# Patient Record
Sex: FEMALE | Race: WHITE | NOT HISPANIC OR LATINO | URBAN - METROPOLITAN AREA
[De-identification: names, ages, dates, MRNs, and addresses within clinical notes are randomized per-mention and may not be internally consistent; named-entity substitution may affect disease eponyms.]

---

## 2018-01-23 ENCOUNTER — INPATIENT (INPATIENT)
Facility: HOSPITAL | Age: 83
LOS: 5 days | Discharge: HOME | End: 2018-01-29
Attending: INTERNAL MEDICINE

## 2018-02-02 DIAGNOSIS — I10 ESSENTIAL (PRIMARY) HYPERTENSION: ICD-10-CM

## 2018-02-02 DIAGNOSIS — N39.0 URINARY TRACT INFECTION, SITE NOT SPECIFIED: ICD-10-CM

## 2018-02-02 DIAGNOSIS — Z98.890 OTHER SPECIFIED POSTPROCEDURAL STATES: ICD-10-CM

## 2018-02-02 DIAGNOSIS — J18.9 PNEUMONIA, UNSPECIFIED ORGANISM: ICD-10-CM

## 2018-02-02 DIAGNOSIS — R47.9 UNSPECIFIED SPEECH DISTURBANCES: ICD-10-CM

## 2018-02-02 DIAGNOSIS — E78.5 HYPERLIPIDEMIA, UNSPECIFIED: ICD-10-CM

## 2018-02-02 DIAGNOSIS — J15.6 PNEUMONIA DUE TO OTHER GRAM-NEGATIVE BACTERIA: ICD-10-CM

## 2018-02-02 DIAGNOSIS — J12.3 HUMAN METAPNEUMOVIRUS PNEUMONIA: ICD-10-CM

## 2018-02-02 DIAGNOSIS — M19.90 UNSPECIFIED OSTEOARTHRITIS, UNSPECIFIED SITE: ICD-10-CM

## 2018-02-02 DIAGNOSIS — Z74.01 BED CONFINEMENT STATUS: ICD-10-CM

## 2018-02-02 DIAGNOSIS — B96.20 UNSPECIFIED ESCHERICHIA COLI [E. COLI] AS THE CAUSE OF DISEASES CLASSIFIED ELSEWHERE: ICD-10-CM

## 2018-02-02 DIAGNOSIS — Z88.0 ALLERGY STATUS TO PENICILLIN: ICD-10-CM

## 2018-02-02 DIAGNOSIS — F03.90 UNSPECIFIED DEMENTIA WITHOUT BEHAVIORAL DISTURBANCE: ICD-10-CM

## 2018-02-02 DIAGNOSIS — A41.9 SEPSIS, UNSPECIFIED ORGANISM: ICD-10-CM

## 2018-02-04 DIAGNOSIS — J18.9 PNEUMONIA, UNSPECIFIED ORGANISM: ICD-10-CM

## 2018-02-05 DIAGNOSIS — F02.80 DEMENTIA IN OTHER DISEASES CLASSIFIED ELSEWHERE, UNSPECIFIED SEVERITY, WITHOUT BEHAVIORAL DISTURBANCE, PSYCHOTIC DISTURBANCE, MOOD DISTURBANCE, AND ANXIETY: ICD-10-CM

## 2018-02-05 DIAGNOSIS — A41.89 OTHER SPECIFIED SEPSIS: ICD-10-CM

## 2018-02-05 DIAGNOSIS — L89.892 PRESSURE ULCER OF OTHER SITE, STAGE 2: ICD-10-CM

## 2018-02-05 DIAGNOSIS — E87.6 HYPOKALEMIA: ICD-10-CM

## 2018-02-05 DIAGNOSIS — G30.9 ALZHEIMER'S DISEASE, UNSPECIFIED: ICD-10-CM

## 2018-12-21 ENCOUNTER — INPATIENT (INPATIENT)
Facility: HOSPITAL | Age: 83
LOS: 3 days | Discharge: HOME HEALTH | End: 2018-12-25
Attending: HOSPITALIST | Admitting: HOSPITALIST

## 2018-12-21 VITALS
RESPIRATION RATE: 18 BRPM | HEART RATE: 75 BPM | OXYGEN SATURATION: 99 % | SYSTOLIC BLOOD PRESSURE: 153 MMHG | DIASTOLIC BLOOD PRESSURE: 82 MMHG | TEMPERATURE: 97 F

## 2018-12-21 LAB
ALBUMIN SERPL ELPH-MCNC: 3.5 G/DL — SIGNIFICANT CHANGE UP (ref 3.5–5.2)
ALP SERPL-CCNC: 83 U/L — SIGNIFICANT CHANGE UP (ref 30–115)
ALT FLD-CCNC: 27 U/L — SIGNIFICANT CHANGE UP (ref 0–41)
ANION GAP SERPL CALC-SCNC: 15 MMOL/L — HIGH (ref 7–14)
APPEARANCE UR: CLEAR — SIGNIFICANT CHANGE UP
AST SERPL-CCNC: 36 U/L — SIGNIFICANT CHANGE UP (ref 0–41)
BASOPHILS # BLD AUTO: 0.03 K/UL — SIGNIFICANT CHANGE UP (ref 0–0.2)
BASOPHILS NFR BLD AUTO: 0.3 % — SIGNIFICANT CHANGE UP (ref 0–1)
BILIRUB SERPL-MCNC: 0.3 MG/DL — SIGNIFICANT CHANGE UP (ref 0.2–1.2)
BILIRUB UR-MCNC: NEGATIVE — SIGNIFICANT CHANGE UP
BUN SERPL-MCNC: 18 MG/DL — SIGNIFICANT CHANGE UP (ref 10–20)
CALCIUM SERPL-MCNC: 8.9 MG/DL — SIGNIFICANT CHANGE UP (ref 8.5–10.1)
CHLORIDE SERPL-SCNC: 106 MMOL/L — SIGNIFICANT CHANGE UP (ref 98–110)
CO2 SERPL-SCNC: 27 MMOL/L — SIGNIFICANT CHANGE UP (ref 17–32)
COLOR SPEC: YELLOW — SIGNIFICANT CHANGE UP
CREAT SERPL-MCNC: 0.7 MG/DL — SIGNIFICANT CHANGE UP (ref 0.7–1.5)
DIFF PNL FLD: NEGATIVE — SIGNIFICANT CHANGE UP
EOSINOPHIL # BLD AUTO: 0.34 K/UL — SIGNIFICANT CHANGE UP (ref 0–0.7)
EOSINOPHIL NFR BLD AUTO: 3.3 % — SIGNIFICANT CHANGE UP (ref 0–8)
EPI CELLS # UR: ABNORMAL /HPF
GLUCOSE SERPL-MCNC: 150 MG/DL — HIGH (ref 70–99)
GLUCOSE UR QL: 250 MG/DL
HCT VFR BLD CALC: 40.3 % — SIGNIFICANT CHANGE UP (ref 37–47)
HGB BLD-MCNC: 12.5 G/DL — SIGNIFICANT CHANGE UP (ref 12–16)
IMM GRANULOCYTES NFR BLD AUTO: 0.7 % — HIGH (ref 0.1–0.3)
KETONES UR-MCNC: NEGATIVE — SIGNIFICANT CHANGE UP
LEUKOCYTE ESTERASE UR-ACNC: NEGATIVE — SIGNIFICANT CHANGE UP
LYMPHOCYTES # BLD AUTO: 2.29 K/UL — SIGNIFICANT CHANGE UP (ref 1.2–3.4)
LYMPHOCYTES # BLD AUTO: 22.4 % — SIGNIFICANT CHANGE UP (ref 20.5–51.1)
MCHC RBC-ENTMCNC: 25.1 PG — LOW (ref 27–31)
MCHC RBC-ENTMCNC: 31 G/DL — LOW (ref 32–37)
MCV RBC AUTO: 80.8 FL — LOW (ref 81–99)
MONOCYTES # BLD AUTO: 0.55 K/UL — SIGNIFICANT CHANGE UP (ref 0.1–0.6)
MONOCYTES NFR BLD AUTO: 5.4 % — SIGNIFICANT CHANGE UP (ref 1.7–9.3)
NEUTROPHILS # BLD AUTO: 6.93 K/UL — HIGH (ref 1.4–6.5)
NEUTROPHILS NFR BLD AUTO: 67.9 % — SIGNIFICANT CHANGE UP (ref 42.2–75.2)
NITRITE UR-MCNC: NEGATIVE — SIGNIFICANT CHANGE UP
NRBC # BLD: 0 /100 WBCS — SIGNIFICANT CHANGE UP (ref 0–0)
PH UR: 6 — SIGNIFICANT CHANGE UP (ref 5–8)
PLATELET # BLD AUTO: 241 K/UL — SIGNIFICANT CHANGE UP (ref 130–400)
POTASSIUM SERPL-MCNC: 4.6 MMOL/L — SIGNIFICANT CHANGE UP (ref 3.5–5)
POTASSIUM SERPL-SCNC: 4.6 MMOL/L — SIGNIFICANT CHANGE UP (ref 3.5–5)
PROT SERPL-MCNC: 7.5 G/DL — SIGNIFICANT CHANGE UP (ref 6–8)
PROT UR-MCNC: 30 MG/DL
RBC # BLD: 4.99 M/UL — SIGNIFICANT CHANGE UP (ref 4.2–5.4)
RBC # FLD: 16.1 % — HIGH (ref 11.5–14.5)
SODIUM SERPL-SCNC: 148 MMOL/L — HIGH (ref 135–146)
SP GR SPEC: 1.02 — SIGNIFICANT CHANGE UP (ref 1.01–1.03)
UROBILINOGEN FLD QL: 1 MG/DL (ref 0.2–0.2)
WBC # BLD: 10.21 K/UL — SIGNIFICANT CHANGE UP (ref 4.8–10.8)
WBC # FLD AUTO: 10.21 K/UL — SIGNIFICANT CHANGE UP (ref 4.8–10.8)
WBC UR QL: SIGNIFICANT CHANGE UP /HPF

## 2018-12-21 RX ORDER — SODIUM CHLORIDE 9 MG/ML
1000 INJECTION INTRAMUSCULAR; INTRAVENOUS; SUBCUTANEOUS ONCE
Qty: 0 | Refills: 0 | Status: COMPLETED | OUTPATIENT
Start: 2018-12-21 | End: 2018-12-21

## 2018-12-21 RX ADMIN — SODIUM CHLORIDE 2000 MILLILITER(S): 9 INJECTION INTRAMUSCULAR; INTRAVENOUS; SUBCUTANEOUS at 22:03

## 2018-12-21 NOTE — ED ADULT TRIAGE NOTE - CHIEF COMPLAINT QUOTE
pt brought in by daughter decreased PO intake x 3 days, generalized weakness pt has a history of advanced alzheimer's and has become non verbal and less responsive symptoms started 3 days ago

## 2018-12-22 DIAGNOSIS — Z90.49 ACQUIRED ABSENCE OF OTHER SPECIFIED PARTS OF DIGESTIVE TRACT: Chronic | ICD-10-CM

## 2018-12-22 LAB
ANION GAP SERPL CALC-SCNC: 13 MMOL/L — SIGNIFICANT CHANGE UP (ref 7–14)
ANION GAP SERPL CALC-SCNC: 16 MMOL/L — HIGH (ref 7–14)
BUN SERPL-MCNC: 10 MG/DL — SIGNIFICANT CHANGE UP (ref 10–20)
BUN SERPL-MCNC: 9 MG/DL — LOW (ref 10–20)
CALCIUM SERPL-MCNC: 7.8 MG/DL — LOW (ref 8.5–10.1)
CALCIUM SERPL-MCNC: 8 MG/DL — LOW (ref 8.5–10.1)
CHLORIDE SERPL-SCNC: 105 MMOL/L — SIGNIFICANT CHANGE UP (ref 98–110)
CHLORIDE SERPL-SCNC: 107 MMOL/L — SIGNIFICANT CHANGE UP (ref 98–110)
CO2 SERPL-SCNC: 23 MMOL/L — SIGNIFICANT CHANGE UP (ref 17–32)
CO2 SERPL-SCNC: 23 MMOL/L — SIGNIFICANT CHANGE UP (ref 17–32)
CREAT SERPL-MCNC: 0.5 MG/DL — LOW (ref 0.7–1.5)
CREAT SERPL-MCNC: 0.5 MG/DL — LOW (ref 0.7–1.5)
ESTIMATED AVERAGE GLUCOSE: 143 MG/DL — HIGH (ref 68–114)
GLUCOSE SERPL-MCNC: 114 MG/DL — HIGH (ref 70–99)
GLUCOSE SERPL-MCNC: 224 MG/DL — HIGH (ref 70–99)
HBA1C BLD-MCNC: 6.6 % — HIGH (ref 4–5.6)
HCT VFR BLD CALC: 35.2 % — LOW (ref 37–47)
HGB BLD-MCNC: 11.1 G/DL — LOW (ref 12–16)
MAGNESIUM SERPL-MCNC: 1.9 MG/DL — SIGNIFICANT CHANGE UP (ref 1.8–2.4)
MCHC RBC-ENTMCNC: 25.3 PG — LOW (ref 27–31)
MCHC RBC-ENTMCNC: 31.5 G/DL — LOW (ref 32–37)
MCV RBC AUTO: 80.2 FL — LOW (ref 81–99)
NRBC # BLD: 0 /100 WBCS — SIGNIFICANT CHANGE UP (ref 0–0)
PLATELET # BLD AUTO: 106 K/UL — LOW (ref 130–400)
POTASSIUM SERPL-MCNC: 3.5 MMOL/L — SIGNIFICANT CHANGE UP (ref 3.5–5)
POTASSIUM SERPL-MCNC: 3.7 MMOL/L — SIGNIFICANT CHANGE UP (ref 3.5–5)
POTASSIUM SERPL-SCNC: 3.5 MMOL/L — SIGNIFICANT CHANGE UP (ref 3.5–5)
POTASSIUM SERPL-SCNC: 3.7 MMOL/L — SIGNIFICANT CHANGE UP (ref 3.5–5)
RBC # BLD: 4.39 M/UL — SIGNIFICANT CHANGE UP (ref 4.2–5.4)
RBC # FLD: 15.8 % — HIGH (ref 11.5–14.5)
SODIUM SERPL-SCNC: 143 MMOL/L — SIGNIFICANT CHANGE UP (ref 135–146)
SODIUM SERPL-SCNC: 144 MMOL/L — SIGNIFICANT CHANGE UP (ref 135–146)
TROPONIN T SERPL-MCNC: <0.01 NG/ML — SIGNIFICANT CHANGE UP
WBC # BLD: 7.76 K/UL — SIGNIFICANT CHANGE UP (ref 4.8–10.8)
WBC # FLD AUTO: 7.76 K/UL — SIGNIFICANT CHANGE UP (ref 4.8–10.8)

## 2018-12-22 RX ORDER — DOCUSATE SODIUM 100 MG
1 CAPSULE ORAL
Qty: 0 | Refills: 0 | COMMUNITY

## 2018-12-22 RX ORDER — COLLAGENASE CLOSTRIDIUM HIST. 250 UNIT/G
1 OINTMENT (GRAM) TOPICAL
Qty: 0 | Refills: 0 | Status: DISCONTINUED | OUTPATIENT
Start: 2018-12-22 | End: 2018-12-25

## 2018-12-22 RX ORDER — SODIUM CHLORIDE 9 MG/ML
1000 INJECTION INTRAMUSCULAR; INTRAVENOUS; SUBCUTANEOUS
Qty: 0 | Refills: 0 | Status: DISCONTINUED | OUTPATIENT
Start: 2018-12-22 | End: 2018-12-22

## 2018-12-22 RX ORDER — ENOXAPARIN SODIUM 100 MG/ML
40 INJECTION SUBCUTANEOUS EVERY 24 HOURS
Qty: 0 | Refills: 0 | Status: DISCONTINUED | OUTPATIENT
Start: 2018-12-22 | End: 2018-12-25

## 2018-12-22 RX ORDER — ASPIRIN/CALCIUM CARB/MAGNESIUM 324 MG
81 TABLET ORAL DAILY
Qty: 0 | Refills: 0 | Status: DISCONTINUED | OUTPATIENT
Start: 2018-12-22 | End: 2018-12-25

## 2018-12-22 RX ORDER — ATORVASTATIN CALCIUM 80 MG/1
80 TABLET, FILM COATED ORAL AT BEDTIME
Qty: 0 | Refills: 0 | Status: DISCONTINUED | OUTPATIENT
Start: 2018-12-22 | End: 2018-12-25

## 2018-12-22 RX ORDER — SODIUM CHLORIDE 9 MG/ML
1000 INJECTION INTRAMUSCULAR; INTRAVENOUS; SUBCUTANEOUS ONCE
Qty: 0 | Refills: 0 | Status: COMPLETED | OUTPATIENT
Start: 2018-12-22 | End: 2018-12-22

## 2018-12-22 RX ORDER — AMLODIPINE BESYLATE 2.5 MG/1
10 TABLET ORAL DAILY
Qty: 0 | Refills: 0 | Status: DISCONTINUED | OUTPATIENT
Start: 2018-12-22 | End: 2018-12-25

## 2018-12-22 RX ORDER — MEMANTINE HYDROCHLORIDE 10 MG/1
10 TABLET ORAL AT BEDTIME
Qty: 0 | Refills: 0 | Status: DISCONTINUED | OUTPATIENT
Start: 2018-12-22 | End: 2018-12-25

## 2018-12-22 RX ORDER — SODIUM CHLORIDE 9 MG/ML
1000 INJECTION INTRAMUSCULAR; INTRAVENOUS; SUBCUTANEOUS
Qty: 0 | Refills: 0 | Status: DISCONTINUED | OUTPATIENT
Start: 2018-12-22 | End: 2018-12-23

## 2018-12-22 RX ADMIN — ATORVASTATIN CALCIUM 80 MILLIGRAM(S): 80 TABLET, FILM COATED ORAL at 21:38

## 2018-12-22 RX ADMIN — AMLODIPINE BESYLATE 10 MILLIGRAM(S): 2.5 TABLET ORAL at 06:44

## 2018-12-22 RX ADMIN — MEMANTINE HYDROCHLORIDE 10 MILLIGRAM(S): 10 TABLET ORAL at 21:38

## 2018-12-22 RX ADMIN — SODIUM CHLORIDE 60 MILLILITER(S): 9 INJECTION INTRAMUSCULAR; INTRAVENOUS; SUBCUTANEOUS at 21:38

## 2018-12-22 RX ADMIN — SODIUM CHLORIDE 60 MILLILITER(S): 9 INJECTION INTRAMUSCULAR; INTRAVENOUS; SUBCUTANEOUS at 18:41

## 2018-12-22 RX ADMIN — ENOXAPARIN SODIUM 40 MILLIGRAM(S): 100 INJECTION SUBCUTANEOUS at 06:44

## 2018-12-22 RX ADMIN — SODIUM CHLORIDE 75 MILLILITER(S): 9 INJECTION INTRAMUSCULAR; INTRAVENOUS; SUBCUTANEOUS at 06:18

## 2018-12-22 RX ADMIN — SODIUM CHLORIDE 1000 MILLILITER(S): 9 INJECTION INTRAMUSCULAR; INTRAVENOUS; SUBCUTANEOUS at 02:23

## 2018-12-22 NOTE — ED PROVIDER NOTE - PHYSICAL EXAMINATION
VITAL SIGNS: I have reviewed the initial vital signs.   CONSTITUTIONAL: Awake, alert. Well-developed; well-nourished; in no distress. Non-toxic appearing.   SKIN: No rash, vesicles/lesion, abrasions or lacerations. No ecchymosis or signs of trauma. Cap refill < 2 secs.   HEAD: Normocephalic; atraumatic. Elderly. Contracted positioning.   EYES: Symmetrical, no discharge or signs of trauma. Conjunctiva and sclera clear.   CARD: No chest wall deformity or tenderness. S1, S2 normal; no murmurs, gallops, or rubs. Regular rate and rhythm.  RESP: Good air movement. Lungs CTAB. No crackles, wheezes, rales or rhonchi.  ABD: Soft; non-distended; non-tender. No rebound/guarding/rigidity.   EXT: No bony deformity or tenderness. Normal ROM x 4 extremities.   NEURO: Non-verbal. n/v intact UE/LE b/l, pulses symmetrical.
negative...

## 2018-12-22 NOTE — H&P ADULT - NSHPPHYSICALEXAM_GEN_ALL_CORE
T(C): 36.4 (22 Dec 2018 01:23), Max: 36.4 (22 Dec 2018 01:23)  T(F): 97.5 (22 Dec 2018 01:23), Max: 97.5 (22 Dec 2018 01:23)  HR: 84 (22 Dec 2018 01:23) (75 - 84)  BP: 137/63 (22 Dec 2018 01:23) (137/63 - 153/82)  RR: 18 (22 Dec 2018 01:23) (18 - 18)  SpO2: 98% (22 Dec 2018 01:23) (98% - 99%)    GENERAL: NAD, speaks in full sentences, no signs of respiratory distress  CHEST/LUNG: Clear to auscultation bilaterally; No wheeze; No crackles; No accessory muscles used  HEART: Regular rate and rhythm; No murmurs;   ABDOMEN: Soft, Nontender, Nondistended; Bowel sounds present; No guarding  EXTREMITIES:  2+ Peripheral Pulses, No cyanosis or edema  PSYCH: AAOx3  NEUROLOGY: non-focal  SKIN: No rashes or lesions T(C): 36.4 (22 Dec 2018 01:23), Max: 36.4 (22 Dec 2018 01:23)  T(F): 97.5 (22 Dec 2018 01:23), Max: 97.5 (22 Dec 2018 01:23)  HR: 84 (22 Dec 2018 01:23) (75 - 84)  BP: 137/63 (22 Dec 2018 01:23) (137/63 - 153/82)  RR: 18 (22 Dec 2018 01:23) (18 - 18)  SpO2: 98% (22 Dec 2018 01:23) (98% - 99%)    GENERAL: NAD, speaks in full sentences, no signs of respiratory distress  CHEST/LUNG: Clear to auscultation bilaterally; No wheeze;   HEART: Regular rate and rhythm;   ABDOMEN: Soft, Nontender, Nondistended;   EXTREMITIES:   No cyanosis or edema  PSYCH: AAOx0  SKIN: pressure ulcers on buttocks

## 2018-12-22 NOTE — ED PROVIDER NOTE - OBJECTIVE STATEMENT
Pt is an 82 y/o Female, PMHX of Arthritis, Alzheimer's dementia, htn, hyperlipidemia, presents to ED accompanied by daughter and son-in-law for failure to thrive. As per family, they state she has been having decreased PO intake, tired and weak since yesterday. They report she is typically non-mobile and non-verbal at baseline, but they feel she is more weak than usual. Report she is unable to indicate whether she is in pain, but family noticed she has not been coughing, spiking fevers or vomiting.

## 2018-12-22 NOTE — H&P ADULT - NSHPLABSRESULTS_GEN_ALL_CORE
12.5   10 )-----------( 241      ( 21 Dec 2018 22:15 )             40.3     148<H>  |  106  |  18  ----------------------------<  150<H>  4.6   |  27  |  0.7    Ca    8.9      21 Dec 2018 22:15    TPro  7.5  /  Alb  3.5  /  TBili  0.3  /  DBili  x   /  AST  36  /  ALT  27  /  AlkPhos  83  12-    Urinalysis Basic - ( 21 Dec 2018 22:15 )    Color: Yellow / Appearance: Clear / S.025 / pH: x  Gluc: x / Ketone: Negative  / Bili: Negative / Urobili: 1.0 mg/dL   Blood: x / Protein: 30 mg/dL / Nitrite: Negative   Leuk Esterase: Negative / RBC: x / WBC 1-2 /HPF   Sq Epi: x / Non Sq Epi: Few /HPF / Bacteria: x    CARDIAC MARKERS ( 21 Dec 2018 23:30 )  x     / <0.01 ng/mL / x     / x     / x

## 2018-12-22 NOTE — H&P ADULT - PMH
Arthritis    Dementia    High cholesterol    HTN (hypertension) Arthritis    Bedbound    Bedsore    Dementia    High cholesterol    HTN (hypertension)    Nonverbal    Pressure ulcer

## 2018-12-22 NOTE — ED ADULT NURSE NOTE - NSIMPLEMENTINTERV_GEN_ALL_ED
Implemented All Fall Risk Interventions:  [place] to call system. Call bell, personal items and telephone within reach. Instruct patient to call for assistance. Room bathroom lighting operational. Non-slip footwear when patient is off stretcher. Physically safe environment: no spills, clutter or unnecessary equipment. Stretcher in lowest position, wheels locked, appropriate side rails in place. Provide visual cue, wrist band, yellow gown, etc. Monitor gait and stability. Monitor for mental status changes and reorient to person, place, and time. Review medications for side effects contributing to fall risk. Reinforce activity limits and safety measures with patient and family.

## 2018-12-22 NOTE — ED ADULT NURSE NOTE - OBJECTIVE STATEMENT
Family states pt has become increasingly sleepy over the past day. Pt responding to tactile and verbal stimulation.

## 2018-12-22 NOTE — ED PROVIDER NOTE - ATTENDING CONTRIBUTION TO CARE
84 yo f with pmh of dementia 82 yo f with pmh of dementia, htn, hld, presents to ED with c/o weakness and increased sleepiness.  as per family, noted since yesterday. dec appetite, only small sips of water x 2 days.  no fever ,no chills, no cp, no sob, no abd pain, no urinary sx.  pt however does not complain.  no falls.  pt is taken care of only by daughter and son in law.  exam: nad, ncat, perrl, eomi, mmm, rrr, ctab, abd soft, nt,nd, arousable imp: pt with weakness, sleepiness and failure to thrive, labs, ekg, cxr, ua, likely admission

## 2018-12-22 NOTE — PATIENT PROFILE ADULT - LANGUAGE ASSISTANCE NEEDED
No-Patient/Caregiver offered and refused free interpretation services./pt dx with dementia, pt unable to communicate via  phone. daughter in law at bedside

## 2018-12-22 NOTE — ED PROVIDER NOTE - MEDICAL DECISION MAKING DETAILS
pt with increased weakness for 2 days, mildly hypernatremic, likely dehydration, ivf and rehab eval pt with increased weakness for 2 days, mildly hypernatremic, likely dehydration, ivf and sw eval

## 2018-12-22 NOTE — H&P ADULT - ASSESSMENT
# hypernatremia likely 2/2 dehydration  # Failure to thrive  # Decrease PO intake  # Inability to ambulate    # Dementia    # HTN    # Dvt ppx  - sc lovenox    # Dispo  - needs placement  - pt/rehab cs # hypernatremia likely 2/2 dehydration  # Failure to thrive  # Decrease PO intake  # Inability to ambulate  - pt/rehab cs  - IVF NS at 75cc for now  - multivitamin     # Dementia  - c/w home meds    # HTN/ DLD/ OA  - stable  - c/w home meds    # Dvt ppx  - sc lovenox    # Dispo  - needs placement  - pt/rehab cs 83 yof with pmh of severe dementia, bedbound, nonverbal, htn, dld, pressure ulcers, OA was brought in by family due to decrease po intake and weakness since 2 days ptp    # hypernatremia likely 2/2 dehydration  # Failure to thrive  # Decrease PO intake  # Weakness 2/2 infectious etiology vs worsening dementia  - IVF NS at 75cc for now  - multivitamin   - feeding assistance; ensure  - pan culture    # Pressure ulcers  - Wound care   - frequent turning    # Dementia  - c/w home meds    # HTN/ DLD/ OA  - stable  - c/w home meds    # Dvt ppx  - sc lovenox    # Dispo  - home with VNS  - please discuss advance directives with family; Informed daughter in law about DNR/DNI option;

## 2018-12-22 NOTE — ED PROVIDER NOTE - PROGRESS NOTE DETAILS
Will admit pt for failure to thrive to medicine under hospitalist. Spoke w/ MAR - aware of pt for admission.

## 2018-12-22 NOTE — H&P ADULT - HISTORY OF PRESENT ILLNESS
83 yof with pmh of severe dementia, bedbound, nonverbal, htn, dld, pressure ulcers, OA was brought in by family due to decrease po intake and weakness since 2 days ptp  - At baseline, pt is non verbal, bedbound and has pressure ulcers; she usually eats soft food with assistance and makes moaning sounds; However, since 2 days she has not been eating or drinking much and appears more weak  - pt from home; dependent on all ADLs since 4 years; lives with family at home;

## 2018-12-22 NOTE — H&P ADULT - ATTENDING COMMENTS
83 yof with pmh of severe dementia, bedbound, nonverbal, htn, dld, pressure ulcers, OA was brought in by family due to decrease po intake and generalized weakness since 2 days ptp    # hypernatremia likely 2/2 dehydration  # Failure to thrive  # Decrease PO intake since 2 days  # Generalized Weakness   # Metabolic Encephalopathy on top of her Baseline Dementia (2/2 hypernatremia)  -Sodium improved with fluids.   Eating better now.   May Decrease IVFs to NS at 60cc/hr now  - multivitamin   - feeding assistance; ensure  - pan culture    #Drop in platelets :   likely dilutional. will monitor.    # Pressure ulcers POA  - don't appear infected  - Wound care   - frequent turning    # Dementia  - c/w home meds    # HTN/ DLD/ OA  - stable  - c/w home meds    # Dvt ppx  - sc lovenox    # Dispo  - home with VNS  - will discuss advance directives with family; Informed daughter in law about DNR/DNI option;

## 2018-12-23 LAB
ALBUMIN SERPL ELPH-MCNC: 2.8 G/DL — LOW (ref 3.5–5.2)
ALP SERPL-CCNC: 67 U/L — SIGNIFICANT CHANGE UP (ref 30–115)
ALT FLD-CCNC: 23 U/L — SIGNIFICANT CHANGE UP (ref 0–41)
ANION GAP SERPL CALC-SCNC: 15 MMOL/L — HIGH (ref 7–14)
APTT BLD: 31.6 SEC — SIGNIFICANT CHANGE UP (ref 27–39.2)
AST SERPL-CCNC: 24 U/L — SIGNIFICANT CHANGE UP (ref 0–41)
BASOPHILS # BLD AUTO: 0.03 K/UL — SIGNIFICANT CHANGE UP (ref 0–0.2)
BASOPHILS NFR BLD AUTO: 0.4 % — SIGNIFICANT CHANGE UP (ref 0–1)
BILIRUB SERPL-MCNC: <0.2 MG/DL — SIGNIFICANT CHANGE UP (ref 0.2–1.2)
BLD GP AB SCN SERPL QL: SIGNIFICANT CHANGE UP
BUN SERPL-MCNC: 14 MG/DL — SIGNIFICANT CHANGE UP (ref 10–20)
CALCIUM SERPL-MCNC: 8 MG/DL — LOW (ref 8.5–10.1)
CHLORIDE SERPL-SCNC: 106 MMOL/L — SIGNIFICANT CHANGE UP (ref 98–110)
CO2 SERPL-SCNC: 21 MMOL/L — SIGNIFICANT CHANGE UP (ref 17–32)
CREAT SERPL-MCNC: 0.6 MG/DL — LOW (ref 0.7–1.5)
CULTURE RESULTS: NO GROWTH — SIGNIFICANT CHANGE UP
EOSINOPHIL # BLD AUTO: 0.4 K/UL — SIGNIFICANT CHANGE UP (ref 0–0.7)
EOSINOPHIL NFR BLD AUTO: 5.8 % — SIGNIFICANT CHANGE UP (ref 0–8)
GLUCOSE SERPL-MCNC: 197 MG/DL — HIGH (ref 70–99)
HCT VFR BLD CALC: 34.3 % — LOW (ref 37–47)
HGB BLD-MCNC: 10.6 G/DL — LOW (ref 12–16)
IMM GRANULOCYTES NFR BLD AUTO: 1.4 % — HIGH (ref 0.1–0.3)
INR BLD: 1.05 RATIO — SIGNIFICANT CHANGE UP (ref 0.65–1.3)
LYMPHOCYTES # BLD AUTO: 1.95 K/UL — SIGNIFICANT CHANGE UP (ref 1.2–3.4)
LYMPHOCYTES # BLD AUTO: 28.3 % — SIGNIFICANT CHANGE UP (ref 20.5–51.1)
MAGNESIUM SERPL-MCNC: 1.9 MG/DL — SIGNIFICANT CHANGE UP (ref 1.8–2.4)
MCHC RBC-ENTMCNC: 24.8 PG — LOW (ref 27–31)
MCHC RBC-ENTMCNC: 30.9 G/DL — LOW (ref 32–37)
MCV RBC AUTO: 80.3 FL — LOW (ref 81–99)
MONOCYTES # BLD AUTO: 0.42 K/UL — SIGNIFICANT CHANGE UP (ref 0.1–0.6)
MONOCYTES NFR BLD AUTO: 6.1 % — SIGNIFICANT CHANGE UP (ref 1.7–9.3)
NEUTROPHILS # BLD AUTO: 4 K/UL — SIGNIFICANT CHANGE UP (ref 1.4–6.5)
NEUTROPHILS NFR BLD AUTO: 58 % — SIGNIFICANT CHANGE UP (ref 42.2–75.2)
NRBC # BLD: 0 /100 WBCS — SIGNIFICANT CHANGE UP (ref 0–0)
PLATELET # BLD AUTO: 212 K/UL — SIGNIFICANT CHANGE UP (ref 130–400)
POTASSIUM SERPL-MCNC: 4 MMOL/L — SIGNIFICANT CHANGE UP (ref 3.5–5)
POTASSIUM SERPL-SCNC: 4 MMOL/L — SIGNIFICANT CHANGE UP (ref 3.5–5)
PROT SERPL-MCNC: 5.7 G/DL — LOW (ref 6–8)
PROTHROM AB SERPL-ACNC: 12.1 SEC — SIGNIFICANT CHANGE UP (ref 9.95–12.87)
RBC # BLD: 4.27 M/UL — SIGNIFICANT CHANGE UP (ref 4.2–5.4)
RBC # FLD: 15.6 % — HIGH (ref 11.5–14.5)
SODIUM SERPL-SCNC: 142 MMOL/L — SIGNIFICANT CHANGE UP (ref 135–146)
SPECIMEN SOURCE: SIGNIFICANT CHANGE UP
TYPE + AB SCN PNL BLD: SIGNIFICANT CHANGE UP
WBC # BLD: 6.9 K/UL — SIGNIFICANT CHANGE UP (ref 4.8–10.8)
WBC # FLD AUTO: 6.9 K/UL — SIGNIFICANT CHANGE UP (ref 4.8–10.8)

## 2018-12-23 RX ORDER — SODIUM CHLORIDE 9 MG/ML
1000 INJECTION INTRAMUSCULAR; INTRAVENOUS; SUBCUTANEOUS
Qty: 0 | Refills: 0 | Status: DISCONTINUED | OUTPATIENT
Start: 2018-12-23 | End: 2018-12-24

## 2018-12-23 RX ADMIN — ATORVASTATIN CALCIUM 80 MILLIGRAM(S): 80 TABLET, FILM COATED ORAL at 21:57

## 2018-12-23 RX ADMIN — SODIUM CHLORIDE 60 MILLILITER(S): 9 INJECTION INTRAMUSCULAR; INTRAVENOUS; SUBCUTANEOUS at 12:52

## 2018-12-23 RX ADMIN — ENOXAPARIN SODIUM 40 MILLIGRAM(S): 100 INJECTION SUBCUTANEOUS at 05:45

## 2018-12-23 RX ADMIN — SODIUM CHLORIDE 50 MILLILITER(S): 9 INJECTION INTRAMUSCULAR; INTRAVENOUS; SUBCUTANEOUS at 19:13

## 2018-12-23 RX ADMIN — Medication 1 TABLET(S): at 12:50

## 2018-12-23 RX ADMIN — Medication 81 MILLIGRAM(S): at 12:49

## 2018-12-23 RX ADMIN — Medication 1 APPLICATION(S): at 17:32

## 2018-12-23 RX ADMIN — MEMANTINE HYDROCHLORIDE 10 MILLIGRAM(S): 10 TABLET ORAL at 21:57

## 2018-12-23 RX ADMIN — AMLODIPINE BESYLATE 10 MILLIGRAM(S): 2.5 TABLET ORAL at 05:31

## 2018-12-23 RX ADMIN — Medication 1 APPLICATION(S): at 05:32

## 2018-12-23 NOTE — PROGRESS NOTE ADULT - ASSESSMENT
83 yof with pmh of severe dementia, bedbound, nonverbal, htn, dld, pressure ulcers, OA was brought in by family due to decrease po intake and generalized weakness since 2 days ptp    # hypernatremia likely 2/2 dehydration  # Failure to thrive  # Decrease PO intake since 2 days  # Generalized Weakness   # Metabolic Encephalopathy on top of her Baseline Dementia (2/2 hypernatremia)  -Sodium improved with fluids.   Eating better now.   May Decrease IVFs to NS at 50cc/hr now if eating alright  - multivitamin   - feeding assistance; ensure  - pan culture    #Drop in platelets : was dilutional. normal again now.   Although Hgb dropped a little. Could be dilutional. Monitor CBC    # Pressure ulcers POA  - don't appear infected  - Wound care   - frequent turning    # Dementia  - c/w home meds    # HTN/ DLD/ OA  - stable  - c/w home meds    # Dvt ppx  - sc lovenox    # Dispo  - home with VNS  - will discuss advance directives with family; Informed daughter in law about DNR/DNI option;

## 2018-12-23 NOTE — PROGRESS NOTE ADULT - SUBJECTIVE AND OBJECTIVE BOX
S : No new events/complaints  BReathing ok      All other pertinent ROS negative.      Vital Signs Last 24 Hrs  T(C): 36.9 (23 Dec 2018 13:34), Max: 37.3 (23 Dec 2018 05:20)  T(F): 98.5 (23 Dec 2018 13:34), Max: 99.1 (23 Dec 2018 05:20)  HR: 86 (23 Dec 2018 13:34) (86 - 95)  BP: 119/55 (23 Dec 2018 13:34) (104/52 - 119/55)  BP(mean): --  RR: 18 (23 Dec 2018 13:34) (18 - 18)  SpO2: 96% (22 Dec 2018 19:50) (96% - 96%)  PHYSICAL EXAM:    Constitutional: NAD,  HEENT: PERR, EOMI, Normal Hearing, MMM  Neck: Soft and supple, No LAD, No JVD  Respiratory: Breath sounds are clear bilaterally, No wheezing, rales or rhonchi  Cardiovascular: S1 and S2, regular rate and rhythm, no Murmurs, gallops or rubs  Gastrointestinal: Bowel Sounds present, soft, nontender, nondistended, no guarding, no rebound  Extremities: No peripheral edema      MEDICATIONS:  MEDICATIONS  (STANDING):  amLODIPine   Tablet 10 milliGRAM(s) Oral daily  aspirin enteric coated 81 milliGRAM(s) Oral daily  atorvastatin 80 milliGRAM(s) Oral at bedtime  collagenase Ointment 1 Application(s) Topical two times a day  enoxaparin Injectable 40 milliGRAM(s) SubCutaneous every 24 hours  memantine 10 milliGRAM(s) Oral at bedtime  multivitamin 1 Tablet(s) Oral daily  sodium chloride 0.9%. 1000 milliLiter(s) (60 mL/Hr) IV Continuous <Continuous>      LABS: All Labs Reviewed:                        10.6   6.90  )-----------( 212      ( 23 Dec 2018 10:23 )             34.3     12-23    142  |  106  |  14  ----------------------------<  197<H>  4.0   |  21  |  0.6<L>    Ca    8.0<L>      23 Dec 2018 10:23  Mg     1.9     12-23    TPro  5.7<L>  /  Alb  2.8<L>  /  TBili  <0.2  /  DBili  x   /  AST  24  /  ALT  23  /  AlkPhos  67  12-23    PT/INR - ( 23 Dec 2018 10:23 )   PT: 12.10 sec;   INR: 1.05 ratio         PTT - ( 23 Dec 2018 10:23 )  PTT:31.6 sec  CARDIAC MARKERS ( 21 Dec 2018 23:30 )  x     / <0.01 ng/mL / x     / x     / x          Blood Culture:     Radiology: reviewed

## 2018-12-24 LAB
ALBUMIN SERPL ELPH-MCNC: 3.2 G/DL — LOW (ref 3.5–5.2)
ALP SERPL-CCNC: 74 U/L — SIGNIFICANT CHANGE UP (ref 30–115)
ALT FLD-CCNC: 30 U/L — SIGNIFICANT CHANGE UP (ref 0–41)
ANION GAP SERPL CALC-SCNC: 16 MMOL/L — HIGH (ref 7–14)
AST SERPL-CCNC: 33 U/L — SIGNIFICANT CHANGE UP (ref 0–41)
BASOPHILS # BLD AUTO: 0.03 K/UL — SIGNIFICANT CHANGE UP (ref 0–0.2)
BASOPHILS NFR BLD AUTO: 0.4 % — SIGNIFICANT CHANGE UP (ref 0–1)
BILIRUB SERPL-MCNC: <0.2 MG/DL — SIGNIFICANT CHANGE UP (ref 0.2–1.2)
BUN SERPL-MCNC: 11 MG/DL — SIGNIFICANT CHANGE UP (ref 10–20)
CALCIUM SERPL-MCNC: 8.5 MG/DL — SIGNIFICANT CHANGE UP (ref 8.5–10.1)
CHLORIDE SERPL-SCNC: 98 MMOL/L — SIGNIFICANT CHANGE UP (ref 98–110)
CO2 SERPL-SCNC: 22 MMOL/L — SIGNIFICANT CHANGE UP (ref 17–32)
CREAT SERPL-MCNC: 0.6 MG/DL — LOW (ref 0.7–1.5)
EOSINOPHIL # BLD AUTO: 0.41 K/UL — SIGNIFICANT CHANGE UP (ref 0–0.7)
EOSINOPHIL NFR BLD AUTO: 4.8 % — SIGNIFICANT CHANGE UP (ref 0–8)
GLUCOSE SERPL-MCNC: 241 MG/DL — HIGH (ref 70–99)
HCT VFR BLD CALC: 35.3 % — LOW (ref 37–47)
HGB BLD-MCNC: 11.3 G/DL — LOW (ref 12–16)
IMM GRANULOCYTES NFR BLD AUTO: 1.5 % — HIGH (ref 0.1–0.3)
LYMPHOCYTES # BLD AUTO: 1.87 K/UL — SIGNIFICANT CHANGE UP (ref 1.2–3.4)
LYMPHOCYTES # BLD AUTO: 21.8 % — SIGNIFICANT CHANGE UP (ref 20.5–51.1)
MAGNESIUM SERPL-MCNC: 1.9 MG/DL — SIGNIFICANT CHANGE UP (ref 1.8–2.4)
MCHC RBC-ENTMCNC: 25.4 PG — LOW (ref 27–31)
MCHC RBC-ENTMCNC: 32 G/DL — SIGNIFICANT CHANGE UP (ref 32–37)
MCV RBC AUTO: 79.3 FL — LOW (ref 81–99)
MONOCYTES # BLD AUTO: 0.52 K/UL — SIGNIFICANT CHANGE UP (ref 0.1–0.6)
MONOCYTES NFR BLD AUTO: 6.1 % — SIGNIFICANT CHANGE UP (ref 1.7–9.3)
NEUTROPHILS # BLD AUTO: 5.61 K/UL — SIGNIFICANT CHANGE UP (ref 1.4–6.5)
NEUTROPHILS NFR BLD AUTO: 65.4 % — SIGNIFICANT CHANGE UP (ref 42.2–75.2)
NRBC # BLD: 0 /100 WBCS — SIGNIFICANT CHANGE UP (ref 0–0)
PLATELET # BLD AUTO: 196 K/UL — SIGNIFICANT CHANGE UP (ref 130–400)
POTASSIUM SERPL-MCNC: 3.9 MMOL/L — SIGNIFICANT CHANGE UP (ref 3.5–5)
POTASSIUM SERPL-SCNC: 3.9 MMOL/L — SIGNIFICANT CHANGE UP (ref 3.5–5)
PROT SERPL-MCNC: 6.3 G/DL — SIGNIFICANT CHANGE UP (ref 6–8)
RBC # BLD: 4.45 M/UL — SIGNIFICANT CHANGE UP (ref 4.2–5.4)
RBC # FLD: 15.6 % — HIGH (ref 11.5–14.5)
SODIUM SERPL-SCNC: 136 MMOL/L — SIGNIFICANT CHANGE UP (ref 135–146)
WBC # BLD: 8.57 K/UL — SIGNIFICANT CHANGE UP (ref 4.8–10.8)
WBC # FLD AUTO: 8.57 K/UL — SIGNIFICANT CHANGE UP (ref 4.8–10.8)

## 2018-12-24 RX ORDER — CHLORHEXIDINE GLUCONATE 213 G/1000ML
1 SOLUTION TOPICAL
Qty: 0 | Refills: 0 | Status: DISCONTINUED | OUTPATIENT
Start: 2018-12-24 | End: 2018-12-25

## 2018-12-24 RX ORDER — COLLAGENASE CLOSTRIDIUM HIST. 250 UNIT/G
1 OINTMENT (GRAM) TOPICAL
Qty: 1 | Refills: 0 | OUTPATIENT
Start: 2018-12-24 | End: 2019-01-22

## 2018-12-24 RX ORDER — CHLORHEXIDINE GLUCONATE 213 G/1000ML
1 SOLUTION TOPICAL
Qty: 0 | Refills: 0 | Status: DISCONTINUED | OUTPATIENT
Start: 2018-12-24 | End: 2018-12-24

## 2018-12-24 RX ORDER — AMLODIPINE BESYLATE 2.5 MG/1
1 TABLET ORAL
Qty: 0 | Refills: 0 | COMMUNITY
Start: 2018-12-24

## 2018-12-24 RX ORDER — AMLODIPINE BESYLATE 2.5 MG/1
1 TABLET ORAL
Qty: 0 | Refills: 0 | COMMUNITY

## 2018-12-24 RX ADMIN — Medication 1 APPLICATION(S): at 17:32

## 2018-12-24 RX ADMIN — SODIUM CHLORIDE 50 MILLILITER(S): 9 INJECTION INTRAMUSCULAR; INTRAVENOUS; SUBCUTANEOUS at 11:38

## 2018-12-24 RX ADMIN — ENOXAPARIN SODIUM 40 MILLIGRAM(S): 100 INJECTION SUBCUTANEOUS at 05:44

## 2018-12-24 RX ADMIN — CHLORHEXIDINE GLUCONATE 1 APPLICATION(S): 213 SOLUTION TOPICAL at 07:01

## 2018-12-24 RX ADMIN — Medication 1 APPLICATION(S): at 05:35

## 2018-12-24 RX ADMIN — Medication 81 MILLIGRAM(S): at 11:39

## 2018-12-24 RX ADMIN — AMLODIPINE BESYLATE 10 MILLIGRAM(S): 2.5 TABLET ORAL at 05:34

## 2018-12-24 RX ADMIN — Medication 1 TABLET(S): at 11:39

## 2018-12-24 RX ADMIN — ATORVASTATIN CALCIUM 80 MILLIGRAM(S): 80 TABLET, FILM COATED ORAL at 21:23

## 2018-12-24 RX ADMIN — MEMANTINE HYDROCHLORIDE 10 MILLIGRAM(S): 10 TABLET ORAL at 21:23

## 2018-12-24 NOTE — PROGRESS NOTE ADULT - SUBJECTIVE AND OBJECTIVE BOX
S : Sleeps most of the day.   Wakes up on being woken up for meals. Eats meals. Then again sleeps.   Bed Bound.   This is apparently her baseline.       All other pertinent ROS negative.      Vital Signs Last 24 Hrs  T(C): 37.8 (24 Dec 2018 05:28), Max: 37.8 (24 Dec 2018 05:28)  T(F): 100 (24 Dec 2018 05:28), Max: 100 (24 Dec 2018 05:28)  HR: 87 (24 Dec 2018 05:28) (86 - 91)  BP: 125/56 (24 Dec 2018 05:28) (117/63 - 125/56)  BP(mean): 83 (23 Dec 2018 21:00) (83 - 83)  RR: 18 (24 Dec 2018 05:28) (18 - 18)  SpO2: 95% (24 Dec 2018 08:00) (95% - 96%)  PHYSICAL EXAM:    Constitutional: sleepy but arousable after much sternal rub stimulation  HEENT: PERR, EOMI, Normal Hearing, MMM  Neck: Soft and supple, No LAD, No JVD  Respiratory: Breath sounds are clear bilaterally, No wheezing, rales or rhonchi  Cardiovascular: S1 and S2, regular rate and rhythm, no Murmurs, gallops or rubs  Gastrointestinal: Bowel Sounds present, soft, nontender, nondistended, no guarding, no rebound  Extremities: No peripheral edema      MEDICATIONS:  MEDICATIONS  (STANDING):  amLODIPine   Tablet 10 milliGRAM(s) Oral daily  aspirin enteric coated 81 milliGRAM(s) Oral daily  atorvastatin 80 milliGRAM(s) Oral at bedtime  chlorhexidine 4% Liquid 1 Application(s) Topical <User Schedule>  collagenase Ointment 1 Application(s) Topical two times a day  enoxaparin Injectable 40 milliGRAM(s) SubCutaneous every 24 hours  memantine 10 milliGRAM(s) Oral at bedtime  multivitamin 1 Tablet(s) Oral daily  sodium chloride 0.9%. 1000 milliLiter(s) (50 mL/Hr) IV Continuous <Continuous>      LABS: All Labs Reviewed:                        11.3   8.57  )-----------( 196      ( 24 Dec 2018 09:32 )             35.3     12-24    136  |  98  |  11  ----------------------------<  241<H>  3.9   |  22  |  0.6<L>    Ca    8.5      24 Dec 2018 09:32  Mg     1.9     12-24    TPro  6.3  /  Alb  3.2<L>  /  TBili  <0.2  /  DBili  x   /  AST  33  /  ALT  30  /  AlkPhos  74  12-24    PT/INR - ( 23 Dec 2018 10:23 )   PT: 12.10 sec;   INR: 1.05 ratio         PTT - ( 23 Dec 2018 10:23 )  PTT:31.6 sec      Blood Culture: 12-22 @ 17:37  Organism --  Gram Stain Blood -- Gram Stain --  Specimen Source .Blood None  Culture-Blood --    12-21 @ 22:15  Organism --  Gram Stain Blood -- Gram Stain --  Specimen Source .Urine Catheterized  Culture-Blood --        Radiology: reviewed

## 2018-12-24 NOTE — PROGRESS NOTE ADULT - ASSESSMENT
83 yof with pmh of severe dementia, bedbound, nonverbal, htn, dld, pressure ulcers, OA was brought in by family due to decrease po intake and generalized weakness since 2 days ptp    # hypernatremia likely 2/2 dehydration  # Failure to thrive  # Decrease PO intake since 2 days  # Generalized Weakness   # Metabolic Encephalopathy on top of her Baseline Dementia (2/2 hypernatremia)  -Sodium improved with fluids.   Eating better now. May D/c IVFs  - multivitamin   - feeding assistance; ensure    We did not find any possible e/o infection as the cause of her problems.     #Drop in platelets : was dilutional. normal again now.   Now avoid daily labs     # Pressure ulcers POA  - don't appear infected  - Wound care   - frequent turning  -Burn eval to just make sure nothing more needs to be done.     # Dementia  - c/w home meds    # HTN/ DLD/ OA  - stable  - c/w home meds    # Dvt ppx  - sc lovenox    # Dispo  Nothing more we are doing for her in the hospital   When daughter in law comes to hospital again tell her she is ready for D/c (tried reaching - didn't answer the phone)  -  home with VNS  - will discuss advance directives with family; Informed daughter in law about DNR/DNI option; 83 yof with pmh of severe dementia, bedbound, nonverbal, htn, dld, pressure ulcers, OA was brought in by family due to decrease po intake and generalized weakness since 2 days ptp    # hypernatremia likely 2/2 dehydration  # Failure to thrive  # Decrease PO intake since 2 days  # Generalized Weakness   # Metabolic Encephalopathy on top of her Baseline Dementia (2/2 hypernatremia)  -Sodium improved with fluids.   Eating better now. May D/c IVFs  - multivitamin   - feeding assistance; ensure    We did not find any possible e/o infection as the cause of her problems.     #Drop in platelets : was dilutional. normal again now.   Now avoid daily labs     # Pressure ulcers POA  - don't appear infected  - Wound care   - frequent turning  -Burn eval to just make sure nothing more needs to be done.     # Dementia  - c/w home meds    # HTN/ DLD/ OA  - stable  - c/w home meds    # Dvt ppx  - sc lovenox    # Dispo  Nothing more we are doing for her in the hospital   Discussed with daughter in law at bedside. D/c home w/ home VN tomorrow.   - explained to her to discuss GOC with patient's  (who is the HCP). They can then ask questions of their PMD and decide GOC.

## 2018-12-24 NOTE — DISCHARGE NOTE ADULT - MEDICATION SUMMARY - MEDICATIONS TO TAKE
I will START or STAY ON the medications listed below when I get home from the hospital:    Aspirin Enteric Coated 81 mg oral delayed release tablet  -- 1 tab(s) by mouth once a day  -- Indication: For coronary artery disease    rosuvastatin 20 mg oral tablet  -- 1 tab(s) by mouth once a day (at bedtime)  -- Indication: For Hyperlipidemia    amLODIPine 10 mg oral tablet  -- 1 tab(s) by mouth once a day  -- Indication: For Hypertension    Exelon 4.6 mg/24 hr transdermal film, extended release  -- 1 patch by transdermal patch once a day  -- Indication: For Dementia    collagenase 250 units/g topical ointment  -- 1 application on skin 2 times a day  -- Indication: For Sacral ulcer    docusate sodium 100 mg oral tablet  -- 1 tab(s) by mouth 2 times a day  -- Indication: For constipation    memantine 10 mg oral tablet  -- 1 tab(s) by mouth once a day  -- Indication: For Dementia    Multiple Vitamins oral tablet  -- 1 tab(s) by mouth once a day  -- Indication: For vitamin

## 2018-12-24 NOTE — DISCHARGE NOTE ADULT - CARE PLAN
Principal Discharge DX:	Failure to thrive in adult  Goal:	Treat and prevent complications of failure to thrive  Assessment and plan of treatment:	Patient had high sodium which resolved with hydration. She is currently eating better now.   Please use santyl for pressure ulcers and turn pt frequently to prevent more ulcers.  Follow up with PMD Dr. Dey in 1 week.  Return to ED if worsening symptoms including altered mental status, not eating, fever/chills. Principal Discharge DX:	Failure to thrive in adult  Goal:	Treat and prevent complications of failure to thrive  Assessment and plan of treatment:	Patient had high sodium which resolved with hydration. She is currently eating better now.   Please use santyl for pressure ulcers and turn pt frequently to prevent more ulcers.  Follow up with PMD Dr. Dey in 1 week.  Return to ED if worsening symptoms including altered mental status, not eating, fever/chills.  Secondary Diagnosis:	Pressure injury of left hip, unstageable  Goal:	Wound care  Assessment and plan of treatment:	The left hip pressure ulcer was unstageable. It was debrided on 12/25. Now it is apparently a stage 3. Apply Santyl then Moist gauze then Allevyn daily.   For the less severe ulcers on Sacrum and Right hip - may just do allevyn.  f/u PMD. Daughter in law will be taught today. Home VN will reinforce. Principal Discharge DX:	Failure to thrive in adult  Goal:	Treat and prevent complications of failure to thrive  Assessment and plan of treatment:	Patient had high sodium which resolved with hydration. She is currently eating better now.   Please use santyl for pressure ulcers and turn pt frequently to prevent more ulcers.  Follow up with PMD Dr. Dey in 1 week.  Return to ED if worsening symptoms including altered mental status, not eating, fever/chills.  Give ensure supplements with every meal.  Secondary Diagnosis:	Pressure injury of left hip, unstageable  Goal:	Wound care  Assessment and plan of treatment:	The left hip pressure ulcer was unstageable. It was debrided on 12/25. Now it is apparently a stage 3. Apply Santyl then Moist gauze then Allevyn daily.   For the less severe ulcers on Sacrum and Right hip - may just do allevyn.  f/u PMD. Daughter in law will be taught today. Home VN will reinforce.

## 2018-12-24 NOTE — DISCHARGE NOTE ADULT - PATIENT PORTAL LINK FT
You can access the [email or web address] Patient Portal, offered by [facility], by registering with the following website: [email or web address]

## 2018-12-24 NOTE — DISCHARGE NOTE ADULT - HOSPITAL COURSE
83 yof with pmh of severe dementia, bedbound, nonverbal, htn, dld, pressure ulcers, OA was brought in by family due to decrease po intake and weakness since 2 days ptp  - At baseline, pt is non verbal, bedbound and has pressure ulcers; she usually eats soft food with assistance and makes moaning sounds; However, since 2 days she has not been eating or drinking much and appears more weak  - pt from home; dependent on all ADLs since 4 years; lives with family at home;     # hypernatremia likely 2/2 dehydration  # Failure to thrive  # Decrease PO intake since 2 days  # Generalized Weakness   # Metabolic Encephalopathy on top of her Baseline Dementia (2/2 hypernatremia)  -Sodium improved with fluids.   Eating better now. May D/c IVFs  - multivitamin   - feeding assistance; ensure    We did not find any possible e/o infection as the cause of her problems.     #Drop in platelets : was dilutional. normal again now.   Now avoid daily labs     # Pressure ulcers POA  - don't appear infected  - Wound care   - frequent turning  -Burn eval to just make sure nothing more needs to be done.     # Dementia  - c/w home meds    # HTN/ DLD/ OA  - stable  - c/w home meds    # Dvt ppx  - sc lovenox    # Dispo- Home VN 83 yof with pmh of severe dementia, bedbound, nonverbal, htn, dld, pressure ulcers, OA was brought in by family due to decrease po intake and weakness since 2 days ptp  - At baseline, pt is non verbal, bedbound and has pressure ulcers; she usually eats soft food with assistance and makes moaning sounds; However, since 2 days she has not been eating or drinking much and appears more weak  - pt from home; dependent on all ADLs since 4 years; lives with family at home;     # hypernatremia likely 2/2 dehydration  # Failure to thrive  # Decrease PO intake since 2 days  # Generalized Weakness   # Metabolic Encephalopathy on top of her Baseline Dementia (2/2 hypernatremia)  -Sodium improved with fluids.   Eating better now. May D/c IVFs  - multivitamin   - feeding assistance; ensure    We did not find any possible e/o infection as the cause of her problems.     #Drop in platelets : was dilutional. normal again now.   Now avoid daily labs     # Pressure ulcers POA  - don't appear infected  -left hip ulcer debrided.   - Wound care as instructed.   - frequent turning      # Dementia  - c/w home meds    # HTN/ DLD/ OA  - stable  - c/w home meds    # Dvt ppx  - sc lovenox    # Dispo- Home VN    Attending NOte (12/25):  d/c plan reviewed and agreed with  Today exam - not different from yesterday.    D/c to home.

## 2018-12-24 NOTE — DIETITIAN INITIAL EVALUATION ADULT. - OTHER INFO
Pt was brought in by family due to decrease po intake and generalized weakness x 2 days pta. Hypernatremia likely 2/2 dehydration + Metabolic Encephalopathy on top of Baseline Dementia (2/2 hypernatremia), eating better now (IV fluids decreased and changed to NS). Pt w/pressure ulcers (not infected per MD). Reason for assessment: pressure ulcer stage II or greater.

## 2018-12-24 NOTE — DIETITIAN INITIAL EVALUATION ADULT. - ENERGY NEEDS
Estimated energy needs: 1435-1675kcal/d (30-35kcal/kg act wt)   Estimated protein needs: 57-72gm/d (1.2-1.5 gm/kg act wt)  Estimated fluid needs: 1ml/1kcal or as per LIP

## 2018-12-24 NOTE — DIETITIAN INITIAL EVALUATION ADULT. - DIET TYPE
supplement (specify)/on soft diet + ENsure Enlive q 8hrs - POintake is not doceumneted however as per flow progress notes PO intake has improved since admission; AM RN and CA does not know how pt is eating/soft

## 2018-12-24 NOTE — DISCHARGE NOTE ADULT - PLAN OF CARE
Treat and prevent complications of failure to thrive Patient had high sodium which resolved with hydration. She is currently eating better now.   Please use santyl for pressure ulcers and turn pt frequently to prevent more ulcers.  Follow up with PMD Dr. Dey in 1 week.  Return to ED if worsening symptoms including altered mental status, not eating, fever/chills. Wound care The left hip pressure ulcer was unstageable. It was debrided on 12/25. Now it is apparently a stage 3. Apply Santyl then Moist gauze then Allevyn daily.   For the less severe ulcers on Sacrum and Right hip - may just do allevyn.  f/u PMD. Daughter in law will be taught today. Home VN will reinforce. Patient had high sodium which resolved with hydration. She is currently eating better now.   Please use santyl for pressure ulcers and turn pt frequently to prevent more ulcers.  Follow up with PMD Dr. Dey in 1 week.  Return to ED if worsening symptoms including altered mental status, not eating, fever/chills.  Give ensure supplements with every meal.

## 2018-12-24 NOTE — DIETITIAN INITIAL EVALUATION ADULT. - MD RECOMMEND
please add CHO consistent modifier to current diet order, change Ensure ENlive to GLucerna shakes q 8hrs (660kcal, 30 gm protein), add Jubem q 12 hrs (160kcal, 5gm protein). Change MVI tyo MVI w/minerals, add Vitamin C 500mg q 12 hrs and Zinc sulfate 220mg q 24hrs (x 14days). Consider insulin regimen vs. oral meds?/other

## 2018-12-25 VITALS
SYSTOLIC BLOOD PRESSURE: 114 MMHG | DIASTOLIC BLOOD PRESSURE: 81 MMHG | RESPIRATION RATE: 18 BRPM | HEART RATE: 87 BPM | TEMPERATURE: 96 F

## 2018-12-25 LAB
ALBUMIN SERPL ELPH-MCNC: 3.1 G/DL — LOW (ref 3.5–5.2)
ALP SERPL-CCNC: 81 U/L — SIGNIFICANT CHANGE UP (ref 30–115)
ALT FLD-CCNC: 40 U/L — SIGNIFICANT CHANGE UP (ref 0–41)
ANION GAP SERPL CALC-SCNC: 14 MMOL/L — SIGNIFICANT CHANGE UP (ref 7–14)
AST SERPL-CCNC: 41 U/L — SIGNIFICANT CHANGE UP (ref 0–41)
BASOPHILS # BLD AUTO: 0.07 K/UL — SIGNIFICANT CHANGE UP (ref 0–0.2)
BASOPHILS NFR BLD AUTO: 0.7 % — SIGNIFICANT CHANGE UP (ref 0–1)
BILIRUB SERPL-MCNC: 0.2 MG/DL — SIGNIFICANT CHANGE UP (ref 0.2–1.2)
BLD GP AB SCN SERPL QL: SIGNIFICANT CHANGE UP
BUN SERPL-MCNC: 10 MG/DL — SIGNIFICANT CHANGE UP (ref 10–20)
CALCIUM SERPL-MCNC: 8.4 MG/DL — LOW (ref 8.5–10.1)
CHLORIDE SERPL-SCNC: 103 MMOL/L — SIGNIFICANT CHANGE UP (ref 98–110)
CO2 SERPL-SCNC: 24 MMOL/L — SIGNIFICANT CHANGE UP (ref 17–32)
CREAT SERPL-MCNC: 0.5 MG/DL — LOW (ref 0.7–1.5)
EOSINOPHIL # BLD AUTO: 0.43 K/UL — SIGNIFICANT CHANGE UP (ref 0–0.7)
EOSINOPHIL NFR BLD AUTO: 4.6 % — SIGNIFICANT CHANGE UP (ref 0–8)
GLUCOSE SERPL-MCNC: 165 MG/DL — HIGH (ref 70–99)
HCT VFR BLD CALC: 37 % — SIGNIFICANT CHANGE UP (ref 37–47)
HGB BLD-MCNC: 11.8 G/DL — LOW (ref 12–16)
IMM GRANULOCYTES NFR BLD AUTO: 2.6 % — HIGH (ref 0.1–0.3)
LYMPHOCYTES # BLD AUTO: 2.14 K/UL — SIGNIFICANT CHANGE UP (ref 1.2–3.4)
LYMPHOCYTES # BLD AUTO: 22.9 % — SIGNIFICANT CHANGE UP (ref 20.5–51.1)
MAGNESIUM SERPL-MCNC: 2.1 MG/DL — SIGNIFICANT CHANGE UP (ref 1.8–2.4)
MCHC RBC-ENTMCNC: 24.9 PG — LOW (ref 27–31)
MCHC RBC-ENTMCNC: 31.9 G/DL — LOW (ref 32–37)
MCV RBC AUTO: 78.1 FL — LOW (ref 81–99)
MONOCYTES # BLD AUTO: 0.56 K/UL — SIGNIFICANT CHANGE UP (ref 0.1–0.6)
MONOCYTES NFR BLD AUTO: 6 % — SIGNIFICANT CHANGE UP (ref 1.7–9.3)
NEUTROPHILS # BLD AUTO: 5.9 K/UL — SIGNIFICANT CHANGE UP (ref 1.4–6.5)
NEUTROPHILS NFR BLD AUTO: 63.2 % — SIGNIFICANT CHANGE UP (ref 42.2–75.2)
NRBC # BLD: 0 /100 WBCS — SIGNIFICANT CHANGE UP (ref 0–0)
PLATELET # BLD AUTO: 216 K/UL — SIGNIFICANT CHANGE UP (ref 130–400)
POTASSIUM SERPL-MCNC: 4 MMOL/L — SIGNIFICANT CHANGE UP (ref 3.5–5)
POTASSIUM SERPL-SCNC: 4 MMOL/L — SIGNIFICANT CHANGE UP (ref 3.5–5)
PROT SERPL-MCNC: 6.5 G/DL — SIGNIFICANT CHANGE UP (ref 6–8)
RBC # BLD: 4.74 M/UL — SIGNIFICANT CHANGE UP (ref 4.2–5.4)
RBC # FLD: 15.7 % — HIGH (ref 11.5–14.5)
SODIUM SERPL-SCNC: 141 MMOL/L — SIGNIFICANT CHANGE UP (ref 135–146)
TYPE + AB SCN PNL BLD: SIGNIFICANT CHANGE UP
WBC # BLD: 9.34 K/UL — SIGNIFICANT CHANGE UP (ref 4.8–10.8)
WBC # FLD AUTO: 9.34 K/UL — SIGNIFICANT CHANGE UP (ref 4.8–10.8)

## 2018-12-25 RX ORDER — COLLAGENASE CLOSTRIDIUM HIST. 250 UNIT/G
1 OINTMENT (GRAM) TOPICAL
Qty: 30 | Refills: 0 | OUTPATIENT
Start: 2018-12-25 | End: 2019-01-23

## 2018-12-25 RX ADMIN — Medication 1 APPLICATION(S): at 05:41

## 2018-12-25 RX ADMIN — Medication 1 TABLET(S): at 11:38

## 2018-12-25 RX ADMIN — ENOXAPARIN SODIUM 40 MILLIGRAM(S): 100 INJECTION SUBCUTANEOUS at 05:42

## 2018-12-25 RX ADMIN — Medication 81 MILLIGRAM(S): at 11:38

## 2018-12-25 RX ADMIN — CHLORHEXIDINE GLUCONATE 1 APPLICATION(S): 213 SOLUTION TOPICAL at 05:40

## 2018-12-25 RX ADMIN — AMLODIPINE BESYLATE 10 MILLIGRAM(S): 2.5 TABLET ORAL at 05:41

## 2018-12-25 NOTE — CONSULT NOTE ADULT - ATTENDING COMMENTS
pt examined as above   Debridement discussed with son and daughter in law and consent obtained for bedside debridement - se procedure note  Pt preeti well   Wound care as rec above

## 2018-12-25 NOTE — CONSULT NOTE ADULT - SUBJECTIVE AND OBJECTIVE BOX
83y  Female  HPI:  83 yof with pmh of severe dementia, bedbound, nonverbal, htn, dld, pressure ulcers, OA was brought in by family due to decrease po intake and weakness since 2 days. At baseline, pt is non verbal, bedbound and has pressure ulcers; she usually eats soft food with assistance and makes moaning sounds; However, since 2 days she has not been eating or drinking much and appears more weak.   pt from home; dependent on all ADLs since 4 years; lives with family at home; (22 Dec 2018 02:58). BURN consult requested for sacral and bilateral hip pressure ulcers.     Allergies    penicillin (Unknown)    PAST MEDICAL & SURGICAL HISTORY:  Bedsore  Pressure ulcer  Bedbound  Nonverbal  Arthritis  High cholesterol  Dementia  HTN (hypertension)  S/P appendectomy    ASSESSMENT: Pt is 83 y/ols Female with PMHx of severe dementia, bedbound, nonverbal, htn, dld, pressure ulcers, OA, was brought in by family due to decrease po intake and weakness since 2 days. Consult requested for sacral and bilateral hip pressure ulcers.     On physical exam:  Right hip and sacrum c/w small stage 2 pressure ulcers, no necrotic tissue, no discharge.  Left hip c/s full thickness pressure wound 3cm x 4cm, base with yellowish necrotic tissue, with serosanguinous discharge, no pus. Wound was debrided on bedside.     RECOMMENDATION:  Wound care:  Sacrum and Right hip: wash wounds with soap and water, apply Allevyn dressing daily.  Left hip: s/p bedside debridement, continue wound care: wash wound with soap and water, apply santyl ointment, pack with moist with normal saline gauze, cover with Allevyn dressing daily.   Offloading/ positional changes  Will follow.   If pt discharge home, follow up in [facility] with Dr Gimenez/Dr Whitt in two weeks. 83y  Female  HPI:  83 yof with pmh of severe dementia, bedbound, nonverbal, htn, dld, pressure ulcers, OA was brought in by family due to decrease po intake and weakness since 2 days. At baseline, pt is non verbal, bedbound and has pressure ulcers; she usually eats soft food with assistance and makes moaning sounds; However, since 2 days she has not been eating or drinking much and appears more weak.   pt from home; dependent on all ADLs since 4 years; lives with family at home; (22 Dec 2018 02:58). BURN consult requested for sacral and bilateral hip pressure ulcers.     Allergies    penicillin (Unknown)    PAST MEDICAL & SURGICAL HISTORY:  Bedsore  Pressure ulcer  Bedbound  Nonverbal  Arthritis  High cholesterol  Dementia  HTN (hypertension)  S/P appendectomy    ASSESSMENT: Pt is 83 y/ols Female with PMHx of severe dementia, bedbound, nonverbal, htn, dld, pressure ulcers, OA, was brought in by family due to decrease po intake and weakness since 2 days. Consult requested for sacral and bilateral hip pressure ulcers.     On physical exam:  Right hip and sacrum healing small stage 2 pressure ulcers, no necrotic tissue, no discharge.  Left hip c/s full thickness pressure wound 3cm x 4cm, base with yellow and grey black necrotic tissue, with serosanguinous discharge, no pus.       RECOMMENDATION:  Wound care:  Sacrum and Right hip: wash wounds with soap and water, apply Allevyn dressing daily.  Left hip: s/p bedside debridement, continue wound care: wash wound with soap and water, apply santyl ointment, pack with moist with normal saline gauze, cover with Allevyn dressing daily.   Offloading/ positional changes  Will follow.   If pt discharge home, follow up in BURN Clinic with Dr Gimenez/Dr Whitt in 2 weeks /prn

## 2018-12-25 NOTE — PROCEDURE NOTE - PROCEDURE
<<-----Click on this checkbox to enter Procedure Debridement and application of dressing  12/25/2018  Sharp excisional debridement of left hip pressure ulcer 4 x 3 x 1 cm  Active  [id number]

## 2018-12-25 NOTE — PROCEDURE NOTE - NSINFORMCONSENT_GEN_A_CORE
Benefits, risks, and possible complications of procedure explained to patient/caregiver who verbalized understanding and gave verbal consent./telephone

## 2018-12-28 DIAGNOSIS — R53.1 WEAKNESS: ICD-10-CM

## 2018-12-28 DIAGNOSIS — Z88.0 ALLERGY STATUS TO PENICILLIN: ICD-10-CM

## 2018-12-28 DIAGNOSIS — I10 ESSENTIAL (PRIMARY) HYPERTENSION: ICD-10-CM

## 2018-12-28 DIAGNOSIS — M19.90 UNSPECIFIED OSTEOARTHRITIS, UNSPECIFIED SITE: ICD-10-CM

## 2018-12-28 DIAGNOSIS — L89.323 PRESSURE ULCER OF LEFT BUTTOCK, STAGE 3: ICD-10-CM

## 2018-12-28 DIAGNOSIS — Z74.01 BED CONFINEMENT STATUS: ICD-10-CM

## 2018-12-28 DIAGNOSIS — L89.152 PRESSURE ULCER OF SACRAL REGION, STAGE 2: ICD-10-CM

## 2018-12-28 DIAGNOSIS — E78.00 PURE HYPERCHOLESTEROLEMIA, UNSPECIFIED: ICD-10-CM

## 2018-12-28 DIAGNOSIS — G30.9 ALZHEIMER'S DISEASE, UNSPECIFIED: ICD-10-CM

## 2018-12-28 DIAGNOSIS — F02.80 DEMENTIA IN OTHER DISEASES CLASSIFIED ELSEWHERE, UNSPECIFIED SEVERITY, WITHOUT BEHAVIORAL DISTURBANCE, PSYCHOTIC DISTURBANCE, MOOD DISTURBANCE, AND ANXIETY: ICD-10-CM

## 2018-12-28 DIAGNOSIS — E86.0 DEHYDRATION: ICD-10-CM

## 2018-12-28 DIAGNOSIS — E87.0 HYPEROSMOLALITY AND HYPERNATREMIA: ICD-10-CM

## 2018-12-28 DIAGNOSIS — R62.7 ADULT FAILURE TO THRIVE: ICD-10-CM

## 2018-12-28 DIAGNOSIS — G93.41 METABOLIC ENCEPHALOPATHY: ICD-10-CM

## 2018-12-28 DIAGNOSIS — L89.312 PRESSURE ULCER OF RIGHT BUTTOCK, STAGE 2: ICD-10-CM

## 2018-12-28 LAB
CULTURE RESULTS: SIGNIFICANT CHANGE UP
SPECIMEN SOURCE: SIGNIFICANT CHANGE UP

## 2019-01-09 ENCOUNTER — INPATIENT (INPATIENT)
Facility: HOSPITAL | Age: 84
LOS: 8 days | Discharge: HOME HEALTH | End: 2019-01-18
Attending: HOSPITALIST | Admitting: HOSPITALIST
Payer: MEDICARE

## 2019-01-09 VITALS
DIASTOLIC BLOOD PRESSURE: 74 MMHG | RESPIRATION RATE: 17 BRPM | TEMPERATURE: 97 F | SYSTOLIC BLOOD PRESSURE: 137 MMHG | HEART RATE: 101 BPM | OXYGEN SATURATION: 99 %

## 2019-01-09 DIAGNOSIS — Z90.49 ACQUIRED ABSENCE OF OTHER SPECIFIED PARTS OF DIGESTIVE TRACT: Chronic | ICD-10-CM

## 2019-01-09 LAB
ALBUMIN SERPL ELPH-MCNC: 3.5 G/DL — SIGNIFICANT CHANGE UP (ref 3.5–5.2)
ALP SERPL-CCNC: 87 U/L — SIGNIFICANT CHANGE UP (ref 30–115)
ALT FLD-CCNC: 74 U/L — HIGH (ref 0–41)
ANION GAP SERPL CALC-SCNC: 17 MMOL/L — HIGH (ref 7–14)
AST SERPL-CCNC: 59 U/L — HIGH (ref 0–41)
BASOPHILS # BLD AUTO: 0.04 K/UL — SIGNIFICANT CHANGE UP (ref 0–0.2)
BASOPHILS NFR BLD AUTO: 0.4 % — SIGNIFICANT CHANGE UP (ref 0–1)
BILIRUB SERPL-MCNC: 0.3 MG/DL — SIGNIFICANT CHANGE UP (ref 0.2–1.2)
BUN SERPL-MCNC: 31 MG/DL — HIGH (ref 10–20)
CALCIUM SERPL-MCNC: 9 MG/DL — SIGNIFICANT CHANGE UP (ref 8.5–10.1)
CHLORIDE SERPL-SCNC: 107 MMOL/L — SIGNIFICANT CHANGE UP (ref 98–110)
CO2 SERPL-SCNC: 25 MMOL/L — SIGNIFICANT CHANGE UP (ref 17–32)
CREAT SERPL-MCNC: 0.9 MG/DL — SIGNIFICANT CHANGE UP (ref 0.7–1.5)
EOSINOPHIL # BLD AUTO: 0.04 K/UL — SIGNIFICANT CHANGE UP (ref 0–0.7)
EOSINOPHIL NFR BLD AUTO: 0.4 % — SIGNIFICANT CHANGE UP (ref 0–8)
GLUCOSE SERPL-MCNC: 343 MG/DL — HIGH (ref 70–99)
HCT VFR BLD CALC: 43.1 % — SIGNIFICANT CHANGE UP (ref 37–47)
HGB BLD-MCNC: 13.1 G/DL — SIGNIFICANT CHANGE UP (ref 12–16)
IMM GRANULOCYTES NFR BLD AUTO: 1.2 % — HIGH (ref 0.1–0.3)
LACTATE SERPL-SCNC: 1.9 MMOL/L — SIGNIFICANT CHANGE UP (ref 0.5–2.2)
LYMPHOCYTES # BLD AUTO: 1.33 K/UL — SIGNIFICANT CHANGE UP (ref 1.2–3.4)
LYMPHOCYTES # BLD AUTO: 12.7 % — LOW (ref 20.5–51.1)
MCHC RBC-ENTMCNC: 24.6 PG — LOW (ref 27–31)
MCHC RBC-ENTMCNC: 30.4 G/DL — LOW (ref 32–37)
MCV RBC AUTO: 80.9 FL — LOW (ref 81–99)
MONOCYTES # BLD AUTO: 0.64 K/UL — HIGH (ref 0.1–0.6)
MONOCYTES NFR BLD AUTO: 6.1 % — SIGNIFICANT CHANGE UP (ref 1.7–9.3)
NEUTROPHILS # BLD AUTO: 8.26 K/UL — HIGH (ref 1.4–6.5)
NEUTROPHILS NFR BLD AUTO: 79.2 % — HIGH (ref 42.2–75.2)
NRBC # BLD: 0 /100 WBCS — SIGNIFICANT CHANGE UP (ref 0–0)
PLATELET # BLD AUTO: 321 K/UL — SIGNIFICANT CHANGE UP (ref 130–400)
POTASSIUM SERPL-MCNC: 4.5 MMOL/L — SIGNIFICANT CHANGE UP (ref 3.5–5)
POTASSIUM SERPL-SCNC: 4.5 MMOL/L — SIGNIFICANT CHANGE UP (ref 3.5–5)
PROT SERPL-MCNC: 7.3 G/DL — SIGNIFICANT CHANGE UP (ref 6–8)
RBC # BLD: 5.33 M/UL — SIGNIFICANT CHANGE UP (ref 4.2–5.4)
RBC # FLD: 17.1 % — HIGH (ref 11.5–14.5)
SODIUM SERPL-SCNC: 149 MMOL/L — HIGH (ref 135–146)
WBC # BLD: 10.44 K/UL — SIGNIFICANT CHANGE UP (ref 4.8–10.8)
WBC # FLD AUTO: 10.44 K/UL — SIGNIFICANT CHANGE UP (ref 4.8–10.8)

## 2019-01-09 RX ORDER — CIPROFLOXACIN LACTATE 400MG/40ML
400 VIAL (ML) INTRAVENOUS ONCE
Qty: 0 | Refills: 0 | Status: COMPLETED | OUTPATIENT
Start: 2019-01-09 | End: 2019-01-09

## 2019-01-09 RX ORDER — SODIUM CHLORIDE 9 MG/ML
1000 INJECTION INTRAMUSCULAR; INTRAVENOUS; SUBCUTANEOUS ONCE
Qty: 0 | Refills: 0 | Status: COMPLETED | OUTPATIENT
Start: 2019-01-09 | End: 2019-01-09

## 2019-01-09 RX ADMIN — SODIUM CHLORIDE 2000 MILLILITER(S): 9 INJECTION INTRAMUSCULAR; INTRAVENOUS; SUBCUTANEOUS at 19:30

## 2019-01-09 RX ADMIN — Medication 200 MILLIGRAM(S): at 22:36

## 2019-01-09 NOTE — ED PROVIDER NOTE - OBJECTIVE STATEMENT
85 yo female with PMH dementia, bedbound and nonverbal, HTN, HLD, arthritis, BIB family c/o drainage with foul smell and tissue darkening around left decubitus ulcer. Pt with debridement by Dr. Gimenez on 12/25 during admission. Has been receiving good wound care with Santyl and VNS following. Pt with decreased eating over past few days. No fevers, chills, vomiting, diarrhea or signs distress. Pt nonverbal for about 3 years, cannot give any history.

## 2019-01-09 NOTE — ED ADULT TRIAGE NOTE - CHIEF COMPLAINT QUOTE
" shes not eating or drinking well for 2 days and her ulcers on b/l hips are not getting better" as per daughter in law. No fevers/chills, n/v/d.

## 2019-01-09 NOTE — ED PROVIDER NOTE - PHYSICAL EXAMINATION
VITAL SIGNS: noted  CONSTITUTIONAL: Chronically ill appearing, frail, contracted, nonverbal; in no acute distress  HEAD: Normocephalic; atraumatic  EYES: PERRL, conjunctiva and sclera clear  ENT: No nasal discharge   NECK: Supple   CARD: S1, S2 normal; no murmurs, gallops, or rubs. Regular rate and rhythm  RESP: CTAB/L, no wheezes, rales or rhonchi  ABD: Normal bowel sounds; soft; non-distended; non-tender   EXT: contracted,  No calf edema.    NEURO: Alert, nonverbal, noncommunicative. Baseline as per daughter in law and son.   SKIN: Stage 3-4 ulcers to left medial knee, sacrum and left hip; left hip with d/c and foul odor noted

## 2019-01-09 NOTE — ED PROVIDER NOTE - MEDICAL DECISION MAKING DETAILS
Patient to be admitted to an inpatient floor. Family aware. Case discussed with and care endorsed to medical admitting resident. Admitting physician notified.

## 2019-01-09 NOTE — ED ADULT NURSE NOTE - PMH
Arthritis    Bedbound    Bedsore    Dementia    High cholesterol    HTN (hypertension)    Nonverbal    Pressure ulcer

## 2019-01-09 NOTE — ED ADULT NURSE NOTE - OBJECTIVE STATEMENT
Patients daughter states she has not been eating well and weakness for the past 2 weeks . Also states that wounds to bilateral hips are getting worse. Unable to assess currently because patient is in hallway.

## 2019-01-10 PROBLEM — L89.90 PRESSURE ULCER OF UNSPECIFIED SITE, UNSPECIFIED STAGE: Chronic | Status: ACTIVE | Noted: 2018-12-22

## 2019-01-10 PROBLEM — M19.90 UNSPECIFIED OSTEOARTHRITIS, UNSPECIFIED SITE: Chronic | Status: ACTIVE | Noted: 2018-12-22

## 2019-01-10 PROBLEM — E78.00 PURE HYPERCHOLESTEROLEMIA, UNSPECIFIED: Chronic | Status: ACTIVE | Noted: 2018-12-22

## 2019-01-10 PROBLEM — I10 ESSENTIAL (PRIMARY) HYPERTENSION: Chronic | Status: ACTIVE | Noted: 2018-12-22

## 2019-01-10 PROBLEM — Z74.01 BED CONFINEMENT STATUS: Chronic | Status: ACTIVE | Noted: 2018-12-22

## 2019-01-10 PROBLEM — R47.01 APHASIA: Chronic | Status: ACTIVE | Noted: 2018-12-22

## 2019-01-10 PROBLEM — F03.90 UNSPECIFIED DEMENTIA WITHOUT BEHAVIORAL DISTURBANCE: Chronic | Status: ACTIVE | Noted: 2018-12-22

## 2019-01-10 LAB
ANION GAP SERPL CALC-SCNC: 14 MMOL/L — SIGNIFICANT CHANGE UP (ref 7–14)
ANION GAP SERPL CALC-SCNC: 19 MMOL/L — HIGH (ref 7–14)
APTT BLD: 23 SEC — CRITICAL LOW (ref 27–39.2)
BASOPHILS # BLD AUTO: 0.04 K/UL — SIGNIFICANT CHANGE UP (ref 0–0.2)
BASOPHILS NFR BLD AUTO: 0.4 % — SIGNIFICANT CHANGE UP (ref 0–1)
BLD GP AB SCN SERPL QL: SIGNIFICANT CHANGE UP
BUN SERPL-MCNC: 21 MG/DL — HIGH (ref 10–20)
BUN SERPL-MCNC: 23 MG/DL — HIGH (ref 10–20)
CALCIUM SERPL-MCNC: 8.2 MG/DL — LOW (ref 8.5–10.1)
CALCIUM SERPL-MCNC: 8.8 MG/DL — SIGNIFICANT CHANGE UP (ref 8.5–10.1)
CHLORIDE SERPL-SCNC: 108 MMOL/L — SIGNIFICANT CHANGE UP (ref 98–110)
CHLORIDE SERPL-SCNC: 113 MMOL/L — HIGH (ref 98–110)
CO2 SERPL-SCNC: 22 MMOL/L — SIGNIFICANT CHANGE UP (ref 17–32)
CO2 SERPL-SCNC: 23 MMOL/L — SIGNIFICANT CHANGE UP (ref 17–32)
CREAT SERPL-MCNC: 0.6 MG/DL — LOW (ref 0.7–1.5)
CREAT SERPL-MCNC: 0.7 MG/DL — SIGNIFICANT CHANGE UP (ref 0.7–1.5)
EOSINOPHIL # BLD AUTO: 0.18 K/UL — SIGNIFICANT CHANGE UP (ref 0–0.7)
EOSINOPHIL NFR BLD AUTO: 2 % — SIGNIFICANT CHANGE UP (ref 0–8)
GLUCOSE SERPL-MCNC: 238 MG/DL — HIGH (ref 70–99)
GLUCOSE SERPL-MCNC: 246 MG/DL — HIGH (ref 70–99)
HCT VFR BLD CALC: 35.3 % — LOW (ref 37–47)
HGB BLD-MCNC: 10.7 G/DL — LOW (ref 12–16)
IMM GRANULOCYTES NFR BLD AUTO: 1.1 % — HIGH (ref 0.1–0.3)
INR BLD: 1.32 RATIO — HIGH (ref 0.65–1.3)
LYMPHOCYTES # BLD AUTO: 1.3 K/UL — SIGNIFICANT CHANGE UP (ref 1.2–3.4)
LYMPHOCYTES # BLD AUTO: 14.1 % — LOW (ref 20.5–51.1)
MAGNESIUM SERPL-MCNC: 2.3 MG/DL — SIGNIFICANT CHANGE UP (ref 1.8–2.4)
MCHC RBC-ENTMCNC: 24.8 PG — LOW (ref 27–31)
MCHC RBC-ENTMCNC: 30.3 G/DL — LOW (ref 32–37)
MCV RBC AUTO: 81.7 FL — SIGNIFICANT CHANGE UP (ref 81–99)
MONOCYTES # BLD AUTO: 0.61 K/UL — HIGH (ref 0.1–0.6)
MONOCYTES NFR BLD AUTO: 6.6 % — SIGNIFICANT CHANGE UP (ref 1.7–9.3)
NEUTROPHILS # BLD AUTO: 6.99 K/UL — HIGH (ref 1.4–6.5)
NEUTROPHILS NFR BLD AUTO: 75.8 % — HIGH (ref 42.2–75.2)
PHOSPHATE SERPL-MCNC: 2.5 MG/DL — SIGNIFICANT CHANGE UP (ref 2.1–4.9)
PLATELET # BLD AUTO: 274 K/UL — SIGNIFICANT CHANGE UP (ref 130–400)
POTASSIUM SERPL-MCNC: 3.9 MMOL/L — SIGNIFICANT CHANGE UP (ref 3.5–5)
POTASSIUM SERPL-MCNC: 4.4 MMOL/L — SIGNIFICANT CHANGE UP (ref 3.5–5)
POTASSIUM SERPL-SCNC: 3.9 MMOL/L — SIGNIFICANT CHANGE UP (ref 3.5–5)
POTASSIUM SERPL-SCNC: 4.4 MMOL/L — SIGNIFICANT CHANGE UP (ref 3.5–5)
PROTHROM AB SERPL-ACNC: 15.1 SEC — HIGH (ref 9.95–12.87)
RBC # BLD: 4.32 M/UL — SIGNIFICANT CHANGE UP (ref 4.2–5.4)
RBC # FLD: 16.5 % — HIGH (ref 11.5–14.5)
SODIUM SERPL-SCNC: 149 MMOL/L — HIGH (ref 135–146)
SODIUM SERPL-SCNC: 150 MMOL/L — HIGH (ref 135–146)
TYPE + AB SCN PNL BLD: SIGNIFICANT CHANGE UP
WBC # BLD: 9.22 K/UL — SIGNIFICANT CHANGE UP (ref 4.8–10.8)
WBC # FLD AUTO: 9.22 K/UL — SIGNIFICANT CHANGE UP (ref 4.8–10.8)

## 2019-01-10 RX ORDER — MEMANTINE HYDROCHLORIDE 10 MG/1
10 TABLET ORAL DAILY
Qty: 0 | Refills: 0 | Status: DISCONTINUED | OUTPATIENT
Start: 2019-01-10 | End: 2019-01-14

## 2019-01-10 RX ORDER — ATORVASTATIN CALCIUM 80 MG/1
80 TABLET, FILM COATED ORAL AT BEDTIME
Qty: 0 | Refills: 0 | Status: DISCONTINUED | OUTPATIENT
Start: 2019-01-10 | End: 2019-01-14

## 2019-01-10 RX ORDER — SODIUM CHLORIDE 9 MG/ML
1000 INJECTION, SOLUTION INTRAVENOUS
Qty: 0 | Refills: 0 | Status: DISCONTINUED | OUTPATIENT
Start: 2019-01-10 | End: 2019-01-14

## 2019-01-10 RX ORDER — AMLODIPINE BESYLATE 2.5 MG/1
10 TABLET ORAL DAILY
Qty: 0 | Refills: 0 | Status: DISCONTINUED | OUTPATIENT
Start: 2019-01-10 | End: 2019-01-14

## 2019-01-10 RX ORDER — CIPROFLOXACIN LACTATE 400MG/40ML
400 VIAL (ML) INTRAVENOUS EVERY 12 HOURS
Qty: 0 | Refills: 0 | Status: DISCONTINUED | OUTPATIENT
Start: 2019-01-10 | End: 2019-01-14

## 2019-01-10 RX ORDER — ASPIRIN/CALCIUM CARB/MAGNESIUM 324 MG
81 TABLET ORAL DAILY
Qty: 0 | Refills: 0 | Status: DISCONTINUED | OUTPATIENT
Start: 2019-01-10 | End: 2019-01-14

## 2019-01-10 RX ORDER — ASCORBIC ACID 60 MG
500 TABLET,CHEWABLE ORAL DAILY
Qty: 0 | Refills: 0 | Status: DISCONTINUED | OUTPATIENT
Start: 2019-01-10 | End: 2019-01-14

## 2019-01-10 RX ORDER — VANCOMYCIN HCL 1 G
1250 VIAL (EA) INTRAVENOUS ONCE
Qty: 0 | Refills: 0 | Status: COMPLETED | OUTPATIENT
Start: 2019-01-10 | End: 2019-01-10

## 2019-01-10 RX ORDER — HEPARIN SODIUM 5000 [USP'U]/ML
5000 INJECTION INTRAVENOUS; SUBCUTANEOUS EVERY 8 HOURS
Qty: 0 | Refills: 0 | Status: DISCONTINUED | OUTPATIENT
Start: 2019-01-10 | End: 2019-01-14

## 2019-01-10 RX ORDER — ZINC SULFATE TAB 220 MG (50 MG ZINC EQUIVALENT) 220 (50 ZN) MG
220 TAB ORAL DAILY
Qty: 0 | Refills: 0 | Status: DISCONTINUED | OUTPATIENT
Start: 2019-01-10 | End: 2019-01-14

## 2019-01-10 RX ORDER — DOCUSATE SODIUM 100 MG
100 CAPSULE ORAL
Qty: 0 | Refills: 0 | Status: DISCONTINUED | OUTPATIENT
Start: 2019-01-10 | End: 2019-01-14

## 2019-01-10 RX ORDER — VANCOMYCIN HCL 1 G
1250 VIAL (EA) INTRAVENOUS EVERY 12 HOURS
Qty: 0 | Refills: 0 | Status: DISCONTINUED | OUTPATIENT
Start: 2019-01-10 | End: 2019-01-14

## 2019-01-10 RX ORDER — CHLORHEXIDINE GLUCONATE 213 G/1000ML
1 SOLUTION TOPICAL
Qty: 0 | Refills: 0 | Status: DISCONTINUED | OUTPATIENT
Start: 2019-01-10 | End: 2019-01-14

## 2019-01-10 RX ORDER — VANCOMYCIN HCL 1 G
VIAL (EA) INTRAVENOUS
Qty: 0 | Refills: 0 | Status: DISCONTINUED | OUTPATIENT
Start: 2019-01-10 | End: 2019-01-14

## 2019-01-10 RX ADMIN — AMLODIPINE BESYLATE 10 MILLIGRAM(S): 2.5 TABLET ORAL at 06:18

## 2019-01-10 RX ADMIN — HEPARIN SODIUM 5000 UNIT(S): 5000 INJECTION INTRAVENOUS; SUBCUTANEOUS at 12:19

## 2019-01-10 RX ADMIN — CHLORHEXIDINE GLUCONATE 1 APPLICATION(S): 213 SOLUTION TOPICAL at 05:57

## 2019-01-10 RX ADMIN — Medication 100 MILLIGRAM(S): at 08:11

## 2019-01-10 RX ADMIN — Medication 166.67 MILLIGRAM(S): at 12:18

## 2019-01-10 RX ADMIN — Medication 500 MILLIGRAM(S): at 12:18

## 2019-01-10 RX ADMIN — Medication 200 MILLIGRAM(S): at 12:18

## 2019-01-10 RX ADMIN — ZINC SULFATE TAB 220 MG (50 MG ZINC EQUIVALENT) 220 MILLIGRAM(S): 220 (50 ZN) TAB at 12:19

## 2019-01-10 RX ADMIN — MEMANTINE HYDROCHLORIDE 10 MILLIGRAM(S): 10 TABLET ORAL at 12:19

## 2019-01-10 RX ADMIN — HEPARIN SODIUM 5000 UNIT(S): 5000 INJECTION INTRAVENOUS; SUBCUTANEOUS at 22:45

## 2019-01-10 RX ADMIN — Medication 100 MILLIGRAM(S): at 01:18

## 2019-01-10 RX ADMIN — Medication 200 MILLIGRAM(S): at 22:45

## 2019-01-10 RX ADMIN — Medication 81 MILLIGRAM(S): at 12:18

## 2019-01-10 RX ADMIN — SODIUM CHLORIDE 75 MILLILITER(S): 9 INJECTION, SOLUTION INTRAVENOUS at 14:53

## 2019-01-10 RX ADMIN — Medication 1 TABLET(S): at 12:19

## 2019-01-10 RX ADMIN — HEPARIN SODIUM 5000 UNIT(S): 5000 INJECTION INTRAVENOUS; SUBCUTANEOUS at 05:57

## 2019-01-10 NOTE — H&P ADULT - NSHPPHYSICALEXAM_GEN_ALL_CORE
ICU Vital Signs Last 24 Hrs  T(C): 37.2 (09 Jan 2019 23:14), Max: 37.2 (09 Jan 2019 23:14)  T(F): 98.9 (09 Jan 2019 23:14), Max: 98.9 (09 Jan 2019 23:14)  HR: 93 (09 Jan 2019 23:14) (93 - 101)  BP: 129/67 (09 Jan 2019 23:14) (129/67 - 137/74)  BP(mean): --  ABP: --  ABP(mean): --  RR: 20 (09 Jan 2019 23:14) (17 - 20)  SpO2: 98% (09 Jan 2019 23:14) (98% - 99%)    PHYSICAL EXAM:  GENERAL: NAD, speaks in full sentences, no signs of respiratory distress  CHEST/LUNG: Clear to auscultation bilaterally; No wheeze; No crackles; No accessory muscles used  HEART: Regular rate and rhythm;  ABDOMEN: Soft, Nontender, Nondistended; Bowel sounds present; No guarding  EXTREMITIES:  2+ Peripheral Pulses, No cyanosis or edema  PSYCH: AAOx3  NEUROLOGY: non-focal ICU Vital Signs Last 24 Hrs  T(C): 37.2 (09 Jan 2019 23:14), Max: 37.2 (09 Jan 2019 23:14)  T(F): 98.9 (09 Jan 2019 23:14), Max: 98.9 (09 Jan 2019 23:14)  HR: 93 (09 Jan 2019 23:14) (93 - 101)  BP: 129/67 (09 Jan 2019 23:14) (129/67 - 137/74)  BP(mean): --  ABP: --  ABP(mean): --  RR: 20 (09 Jan 2019 23:14) (17 - 20)  SpO2: 98% (09 Jan 2019 23:14) (98% - 99%)    PHYSICAL EXAM:  GENERAL: NAD  CHEST/LUNG: Clear to auscultation bilaterally; No wheeze; No crackles; No accessory muscles used  HEART: Regular rate and rhythm;  ABDOMEN: Soft, Nontender, Nondistended; Bowel sounds present; No guarding  EXTREMITIES: Stage 3-4 ulcers to left medial knee, sacrum and left hip; left hip with d/c and foul odor noted  NEUROLOGY: couldn't assess

## 2019-01-10 NOTE — CONSULT NOTE ADULT - SUBJECTIVE AND OBJECTIVE BOX
HPI:  84 yof with pmh of severe dementia, bedbound, nonverbal, htn, dld, pressure ulcers, OA was brought in by family due to decrease po intake and c/o drainage with foul smell and tissue darkening around left decubitus ulcer. No fever, chills, SOB, palpitations, headache, dizziness.   she usually eats soft food with assistance and makes moaning sounds,from home; dependent on all ADLs since 4 years; lives with family at home;       Underwent debridement by Dr. Gimenez on 12/25.. Has been receiving good wound care with Santyl and VNS following. vitals:137/74, 101, 98.9F, 17, 99% on room air,  Labs no inc WBC, sodium of 149. received cipro,clinda, 1 litre bolus in ED (10 Carlos 2019 01:34)    EXAM;   pt lethargic responds with moaning to stim     bilateral trochanteric wounds ~ 4 x  4  with dark necrotic tissue, exudate and drainage   Sacrum - pink grossly clean wound 4 x 4 cm

## 2019-01-10 NOTE — SWALLOW BEDSIDE ASSESSMENT ADULT - SLP PERTINENT HISTORY OF CURRENT PROBLEM
pt admitted from home for decreased PO intake and c/o drainage with foul smell and tissue darkening around left decubitus ulcer. pt bedbound and nonverbal at baseline; known to acute SLP service during previous admission w/ recs for puree w/ nectar-thick liquids via tsp; pt being treated for failure to thrive and hypernatremia.

## 2019-01-10 NOTE — H&P ADULT - NSHPLABSRESULTS_GEN_ALL_CORE
13.1   10.44 )-----------( 321      ( 09 Jan 2019 17:36 )             43.1       01-09    149<H>  |  107  |  31<H>  ----------------------------<  343<H>  4.5   |  25  |  0.9    Ca    9.0      09 Jan 2019 17:36    TPro  7.3  /  Alb  3.5  /  TBili  0.3  /  DBili  x   /  AST  59<H>  /  ALT  74<H>  /  AlkPhos  87  01-09

## 2019-01-10 NOTE — GOALS OF CARE CONVERSATION - PERSONAL ADVANCE DIRECTIVE - CONVERSATION DETAILS
Her family was contacted this morning.  Her children were undecided re: code status, and wished to speak with their father prior to making a decision re: DNR/DNI

## 2019-01-10 NOTE — H&P ADULT - ASSESSMENT
#)Weakness, Likely due to metabolic encephalopathy likely due to hypernatremia due to dehydartion    #)Decubitus ulcer    #)HTN  #)DLD  #)Dementia    #)Diet:  #)Activtity:  #)DVt ppx:  #)GI ppx:  #)Dispo;  #)Code: 84 yof with pmh of severe dementia, bedbound, nonverbal, htn, dld, pressure ulcers, OA was brought in by family due to decrease po intake and c/o drainage with foul smell and tissue darkening around left decubitus ulcer.    #)Failure to thrive  #)Weakness, Likely due to metabolic encephalopathy likely due to hypernatremia due to dehydration  -will start her on NS@75/hour  -Repeat BMP AM    #)Decubitus ulcer stage 4 on sacrum with foul smell, no evidence of sepsis  -c/w cipro and clinda,, allergic to penicillin  -Follow up blood culture  -Follow up burn consult  -Vit C, zinc sulfate    #)HTN  -c/w amlodipine    #)DLD  -crestor changed to lipitor    #)Dementia  c/w memantine    #)Diet: Soft diet  #)Activtity: OOBTC  #)DVt ppx: Hep SC  #)GI ppx: Not indicated  #)Dispo; From home

## 2019-01-10 NOTE — H&P ADULT - NSHPPOAPULMEMBOLUS_GEN_A_CORE
Patient came in as a follow-up for the treatment for her lichen sclerosus. It looks like it is resolving at least improving with the medication  Patient should follow-up either with daily dosing or even maybe every other day, depending upon her symptoms patient can follow-up p.r.n.  
no

## 2019-01-10 NOTE — H&P ADULT - ATTENDING COMMENTS
Patient seen and examined independently of housestaff.  The patient is non-verbal at baseline, and history was obtained from family.  She underwent previous debridement on 12/25/18, and has been receiving wound care at home.  She was referred to [facility] for increased drainage and discoloration to the sacrum.    84yof with Past Medical History Severe Dementia (bedboung and nonverbal baseline), HTN, Hypercholesteremia, pressure ulcers, and OA admitted to [facility] for decreased oral intake and increased drainage and discoloration to her left sided decubitus ulcer    Stage IV Decubitus Ulcer: admit to medicine.  Consult Burn Surgery-Dr. Gimenez and Infectious Disease-Dr. Yates.  Continue treatment with Cipro and Clindamycin (cannot give Unasyn due to PCN allergy).  Follow-up results from blood cultures.  Continue supportive care with Vitamin C and Zinc Sulfate    Severe Dementia/Failure to Thrive/Hypernatremia: continue 1:1 feeds and encourage fluid intake.  For hypernatremia, continue D5/W and repeat BMP for tomorrow morning.  Continue supportive care with Memantine for dementia.  I had a goals of care discussion with family this AM; they are waiting to discuss code status with the patient's  prior to signing any documentation.    Hypertension: continue Norvasc    Hypercholesteremia: continue Lipitor    GI/DVT prophylaxis      #)HTN  -c/w amlodipine    #)DLD  -crestor changed to lipitor    #)Dementia  c/w memantine    #)Diet: Soft diet  #)Activtity: OOBTC  #)DVt ppx: Hep SC  #)GI ppx: Not indicated

## 2019-01-10 NOTE — H&P ADULT - HISTORY OF PRESENT ILLNESS
84 yof with pmh of severe dementia, bedbound, nonverbal, htn, dld, pressure ulcers, OA was brought in by family due to decrease po intake and c/o drainage with foul smell and tissue darkening around left decubitus ulcer.  she usually eats soft food with assistance and makes moaning sounds,from home; dependent on all ADLs since 4 years; lives with family at home;       Underwent debridement by Dr. Gimenez on 12/25.. Has been receiving good wound care with Santyl and VNS following. vitals:137/74, 101, 98.9F, 17, 99% on room air,  Labs no inc WBC, sodium of 149. received cipro,clinda, 1 litre bolus in ED 84 yof with pmh of severe dementia, bedbound, nonverbal, htn, dld, pressure ulcers, OA was brought in by family due to decrease po intake and c/o drainage with foul smell and tissue darkening around left decubitus ulcer. No fever, chills, SOB, palpitations, headache, dizziness.   she usually eats soft food with assistance and makes moaning sounds,from home; dependent on all ADLs since 4 years; lives with family at home;       Underwent debridement by Dr. Gimenez on 12/25.. Has been receiving good wound care with Santyl and VNS following. vitals:137/74, 101, 98.9F, 17, 99% on room air,  Labs no inc WBC, sodium of 149. received cipro,clinda, 1 litre bolus in ED

## 2019-01-11 LAB
ANION GAP SERPL CALC-SCNC: 14 MMOL/L — SIGNIFICANT CHANGE UP (ref 7–14)
ANION GAP SERPL CALC-SCNC: 20 MMOL/L — HIGH (ref 7–14)
BUN SERPL-MCNC: 11 MG/DL — SIGNIFICANT CHANGE UP (ref 10–20)
BUN SERPL-MCNC: 16 MG/DL — SIGNIFICANT CHANGE UP (ref 10–20)
CALCIUM SERPL-MCNC: 8.1 MG/DL — LOW (ref 8.5–10.1)
CALCIUM SERPL-MCNC: 8.3 MG/DL — LOW (ref 8.5–10.1)
CHLORIDE SERPL-SCNC: 104 MMOL/L — SIGNIFICANT CHANGE UP (ref 98–110)
CHLORIDE SERPL-SCNC: 99 MMOL/L — SIGNIFICANT CHANGE UP (ref 98–110)
CO2 SERPL-SCNC: 20 MMOL/L — SIGNIFICANT CHANGE UP (ref 17–32)
CO2 SERPL-SCNC: 24 MMOL/L — SIGNIFICANT CHANGE UP (ref 17–32)
CREAT SERPL-MCNC: 0.5 MG/DL — LOW (ref 0.7–1.5)
CREAT SERPL-MCNC: 0.5 MG/DL — LOW (ref 0.7–1.5)
GLUCOSE SERPL-MCNC: 201 MG/DL — HIGH (ref 70–99)
GLUCOSE SERPL-MCNC: 250 MG/DL — HIGH (ref 70–99)
HCT VFR BLD CALC: 35.1 % — LOW (ref 37–47)
HGB BLD-MCNC: 10.7 G/DL — LOW (ref 12–16)
MCHC RBC-ENTMCNC: 24.4 PG — LOW (ref 27–31)
MCHC RBC-ENTMCNC: 30.5 G/DL — LOW (ref 32–37)
MCV RBC AUTO: 80.1 FL — LOW (ref 81–99)
NRBC # BLD: 0 /100 WBCS — SIGNIFICANT CHANGE UP (ref 0–0)
PLATELET # BLD AUTO: 188 K/UL — SIGNIFICANT CHANGE UP (ref 130–400)
POTASSIUM SERPL-MCNC: 3.6 MMOL/L — SIGNIFICANT CHANGE UP (ref 3.5–5)
POTASSIUM SERPL-MCNC: 3.9 MMOL/L — SIGNIFICANT CHANGE UP (ref 3.5–5)
POTASSIUM SERPL-SCNC: 3.6 MMOL/L — SIGNIFICANT CHANGE UP (ref 3.5–5)
POTASSIUM SERPL-SCNC: 3.9 MMOL/L — SIGNIFICANT CHANGE UP (ref 3.5–5)
RBC # BLD: 4.38 M/UL — SIGNIFICANT CHANGE UP (ref 4.2–5.4)
RBC # FLD: 16 % — HIGH (ref 11.5–14.5)
SODIUM SERPL-SCNC: 137 MMOL/L — SIGNIFICANT CHANGE UP (ref 135–146)
SODIUM SERPL-SCNC: 144 MMOL/L — SIGNIFICANT CHANGE UP (ref 135–146)
WBC # BLD: 8.2 K/UL — SIGNIFICANT CHANGE UP (ref 4.8–10.8)
WBC # FLD AUTO: 8.2 K/UL — SIGNIFICANT CHANGE UP (ref 4.8–10.8)

## 2019-01-11 RX ORDER — SODIUM HYPOCHLORITE 0.125 %
1 SOLUTION, NON-ORAL MISCELLANEOUS DAILY
Qty: 0 | Refills: 0 | Status: DISCONTINUED | OUTPATIENT
Start: 2019-01-11 | End: 2019-01-11

## 2019-01-11 RX ORDER — COLLAGENASE CLOSTRIDIUM HIST. 250 UNIT/G
1 OINTMENT (GRAM) TOPICAL DAILY
Qty: 0 | Refills: 0 | Status: DISCONTINUED | OUTPATIENT
Start: 2019-01-11 | End: 2019-01-11

## 2019-01-11 RX ADMIN — HEPARIN SODIUM 5000 UNIT(S): 5000 INJECTION INTRAVENOUS; SUBCUTANEOUS at 14:44

## 2019-01-11 RX ADMIN — CHLORHEXIDINE GLUCONATE 1 APPLICATION(S): 213 SOLUTION TOPICAL at 05:05

## 2019-01-11 RX ADMIN — HEPARIN SODIUM 5000 UNIT(S): 5000 INJECTION INTRAVENOUS; SUBCUTANEOUS at 05:06

## 2019-01-11 RX ADMIN — Medication 200 MILLIGRAM(S): at 09:38

## 2019-01-11 RX ADMIN — HEPARIN SODIUM 5000 UNIT(S): 5000 INJECTION INTRAVENOUS; SUBCUTANEOUS at 22:19

## 2019-01-11 RX ADMIN — Medication 166.67 MILLIGRAM(S): at 18:49

## 2019-01-11 RX ADMIN — Medication 200 MILLIGRAM(S): at 22:18

## 2019-01-11 RX ADMIN — Medication 166.67 MILLIGRAM(S): at 05:05

## 2019-01-11 NOTE — DIETITIAN INITIAL EVALUATION ADULT. - MD RECOMMEND
SLP recs NPO w/ alternate means of nutrition: Osmolite 1.0 @360ml x5 feeds + Prosource TF BID, to provide 100% of kcal needs and 104% of protein needs: 1280 kcal, 72g Pro, 1008ml free h2o. Free h2o flushes per LIP. Upon initiation: Day 1: 120 ml q6hrs. If tolerated, day 2: 240 q6hrs. As admitted w/ poor PO, pt at risk for refeeding syndrome, monitor electrolytes (K, Phos, Mg) closely upon advancing feeds. SLP recs NPO w/ alternate means of nutrition: Osmolite 1.0 @240ml x5 feeds + Prosource TF BID, to provide 100% of kcal needs and 104% of protein needs: 1280 kcal, 72g Pro, 1008ml free h2o. Free h2o flushes per LIP. Upon initiation: Day 1: 120 ml q6hrs. If tolerated, day 2: 240 q6hrs. As admitted w/ poor PO, pt at risk for refeeding syndrome, monitor electrolytes (K, Phos, Mg) closely upon advancing feeds.

## 2019-01-11 NOTE — DIETITIAN INITIAL EVALUATION ADULT. - PHYSICAL APPEARANCE
pt asleep and no family present. To perform NFPE upon f/u as pt documented FTT and malnourished per LIP

## 2019-01-11 NOTE — CHART NOTE - NSCHARTNOTEFT_GEN_A_CORE
NG tube place start tube feeds.  NPO. Stop tube feeds Sunday night OR on monday. NG tube placement failed to pass gastroesophageal junction. NG tube Coiled at lower esophageal regions.

## 2019-01-11 NOTE — SWALLOW BEDSIDE ASSESSMENT ADULT - SLP PERTINENT HISTORY OF CURRENT PROBLEM
pt being treated for failure to thrive/hypernatremia; PMHx significant for dementia; as per chart, GOC conversation-> family wishes for DNR/DNI, they want all burn procedures done, NGT/feeding tube if necessary.

## 2019-01-11 NOTE — PROGRESS NOTE ADULT - ASSESSMENT
84yof with Past Medical History Severe Dementia nonverbal with functional quadriplegia, HTN, Hypercholesteremia, pressure ulcers, and OA admitted to [facility] for decreased oral intake and increased drainage and discoloration to her left sided decubitus ulcer    Stage IV Decubitus Ulcer: admit to medicine.  Consult Burn Surgery-Dr. Gimenez and Infectious Disease-Dr. Yates.  Continue treatment with Cipro and Clindamycin (cannot give Unasyn due to PCN allergy).  Follow-up results from blood cultures.  Continue supportive care with Vitamin C and Zinc Sulfate  local wound care per burn recommendation + possible debridement if family amenable, offloading     Severe Dementia/Failure to Thrive/Hypernatremia: unable to participate with PO intake, NPO after swallow evaluation, will need to discuss further GOC with family such as possibility for NGT and free water.  For now for hypernatremia, continue D5/W trend BMP.  Continue supportive care with Memantine for dementia.   Hypertension: continue Norvasc    Hypercholesteremia: continue Lipitor    GI/DVT prophylaxis      #)HTN  -c/w amlodipine    #)DLD  -crestor changed to lipitor    #)Dementia  c/w memantine    #)Diet: Soft diet  #)Activtity: OOBTC  #)DVt ppx: Hep SC  #)GI ppx: Not indicated  DISPO- dependent on GOC  high risk

## 2019-01-11 NOTE — SWALLOW BEDSIDE ASSESSMENT ADULT - SWALLOW EVAL: DIAGNOSIS
Extensive oral care provided; white substance noted on anterior lingual surface, RN notified. pt w/ difficulty maintaining arousal, not appropriate for PO trials- high aspiration risk.

## 2019-01-11 NOTE — GOALS OF CARE CONVERSATION - PERSONAL ADVANCE DIRECTIVE - CONVERSATION DETAILS
Patient son was informed of treatment Options and prognosis. They have agreed to make Ms. Doran DNR/DNI only. They wanted all Burn procedures done, NG tube and feeding tube  if necessary, IVF,  antibiotic as well.

## 2019-01-11 NOTE — DIETITIAN INITIAL EVALUATION ADULT. - ENERGY NEEDS
Energy needs: 1090-1365kcal (MSJ x1.2-1.5AF) pt underwt, malnourished per LIP, FTT, multiple pressure injuries  Protein needs: 57-69g (1.25-1.5g/kg) pt multiple pressure injuries, FTT, underwt  Fluid needs: per LIP

## 2019-01-11 NOTE — DIETITIAN INITIAL EVALUATION ADULT. - OTHER INFO
Pt admited w/ decreased PO and pressure injuries w/ foul semll and tissue darkening. Pt is failure to thrive and malnourished per LIP. W/ met encephalopathy. To start NS@75ml/hr Pt admited w/ decreased PO and pressure injuries w/ foul smell and tissue darkening. Pt is failure to thrive and malnourished per LIP. W/ met encephalopathy. To start NS@75ml/hr

## 2019-01-11 NOTE — PROGRESS NOTE ADULT - ASSESSMENT
84 yof with pmh of severe dementia, bedbound, nonverbal, htn, dld, pressure ulcers, OA was brought in by family due to decrease po intake and c/o drainage with foul smell and tissue darkening around left decubitus ulcer. No fever, chills, SOB, palpitations, headache, dizziness.   she usually eats soft food with assistance and makes moaning sounds,from home; dependent on all ADLs since 4 years; lives with family at home;     01/10: Underwent debridement by Dr. Gimenez on 12/25.. Has been receiving good wound care with Santyl and VNS following. vitals:137/74, 101, 98.9F, 17, 99% on room air,  Labs no inc WBC, sodium of 149. received cipro,clinda, 1 litre bolus in ED (10 Carlos 2019 01:34) GOC: Her family was contacted.  Her children were undecided re: code status, and wished to speak with their father prior to making a decision re: DNR/DNI      01/11: Nursing wound care CS, Dietitian cs, F/u ID, NPO as per S/S, will discus GOC with family.     IMPRESSION  Failure to Thrive   PNA vs, atelectasis   Decubitus Ulcers   Metabolic encephalopathy  Possibly 2/2 Hypernatremia   Malnourished     Plan   #)Failure to thrive  #)Weakness, Likely due to metabolic encephalopathy likely due to hypernatremia due to dehydration  -will start her on NS@75/hour  - f/u Dietitian       Plan  #)Decubitus ulcer stage 4 on sacrum with foul smell, no evidence of sepsis  -c/w cipro and clinda,, allergic to penicillin  -Follow up ID   -Follow up burn consult  - Vanc tough before third dose   - wound care management  silvadene cream for barrier protection       #Metabolic encephalopathy  Possibly 2/2 Hypernatremia   - Hypernatremia improved.   - continue d5 75 ml/hr  - f/u at 4pm bmp  - NPO     #)HTN  -c/w amlodipine    #)DLD  -crestor changed to lipitor    #)Dementia  c/w memantine    Electrolyte Imbalances: Correct as needed  []  Hyponatremia   /   Hypernatremia  [x]   []  Hypokalemia   /   Hyperkalemia  []   []  Hypochlorhydria   /    Hypochlorhydria  []   []  Hypomagnesemia   /   Hypermagnesemia  []   []  Hypophosphatemia   /   Hyperphosphatemia  []       GI ppx:                                   [] Not indicated   /   [] Pantoprazole 40mg PO Daily    DVT ppx:  [] Not indicated / [x] Heparin 5000mg SubQ / [] Lovenox 40mg SubQ / [] SCDs    Fluids: NPO  [] PO  |  [X] IVF D5     Activity:  [X] Assisatnce needed   [X] Increase as Tolerated  /  [] OOB w/ assist  /  [] Bed Rest    BMI:17.4 malnourished   Height (cm): 162.56 (01-11)  Weight (kg): 45.9 (01-11)  BMI (kg/m2): 17.4 (01-11)      DISPO:  Patient to be discharged when condition(s) optimized.  [ ] From Home     [ ] NH/SNF   [ ] 4A Rehab  [ ] Detox Clinic  [X] Plan Discussion with patient and/or family. GOC with family awaiting final decision for DC.   [X] Discussed Case and Plan with the Medical Attending.    CODE STATUS  [X] FULL   /    [] DNR/DNI

## 2019-01-11 NOTE — DIETITIAN INITIAL EVALUATION ADULT. - SOURCE
Pt c/s for pressure injury stage II or >. Pt nonverbal. Attempting to reach family members. Will f/u w/ nutr hx upon f/u. To f/u w/ GOC-awaiting family. Per speech rec pt NPO w/ alternate means of nutrition. Enteral Nutrition recs below. Pt w/ stage IV pressure injuries to L and R hips, stage III L buttock, sacrum, and stage II L inner knee. Pt already receiving MVI, vit C, and zinc per LIP. BMI: 17.4. IBW: 54.5kg./other (specify)

## 2019-01-12 LAB
ANION GAP SERPL CALC-SCNC: 18 MMOL/L — HIGH (ref 7–14)
BUN SERPL-MCNC: 8 MG/DL — LOW (ref 10–20)
CALCIUM SERPL-MCNC: 8.5 MG/DL — SIGNIFICANT CHANGE UP (ref 8.5–10.1)
CHLORIDE SERPL-SCNC: 101 MMOL/L — SIGNIFICANT CHANGE UP (ref 98–110)
CO2 SERPL-SCNC: 22 MMOL/L — SIGNIFICANT CHANGE UP (ref 17–32)
CREAT SERPL-MCNC: 0.6 MG/DL — LOW (ref 0.7–1.5)
GLUCOSE SERPL-MCNC: 260 MG/DL — HIGH (ref 70–99)
HCT VFR BLD CALC: 35.7 % — LOW (ref 37–47)
HGB BLD-MCNC: 11.3 G/DL — LOW (ref 12–16)
MAGNESIUM SERPL-MCNC: 2 MG/DL — SIGNIFICANT CHANGE UP (ref 1.8–2.4)
MCHC RBC-ENTMCNC: 24.4 PG — LOW (ref 27–31)
MCHC RBC-ENTMCNC: 31.7 G/DL — LOW (ref 32–37)
MCV RBC AUTO: 76.9 FL — LOW (ref 81–99)
NRBC # BLD: 0 /100 WBCS — SIGNIFICANT CHANGE UP (ref 0–0)
PHOSPHATE SERPL-MCNC: 2.6 MG/DL — SIGNIFICANT CHANGE UP (ref 2.1–4.9)
PLATELET # BLD AUTO: 157 K/UL — SIGNIFICANT CHANGE UP (ref 130–400)
POTASSIUM SERPL-MCNC: 3.3 MMOL/L — LOW (ref 3.5–5)
POTASSIUM SERPL-SCNC: 3.3 MMOL/L — LOW (ref 3.5–5)
RBC # BLD: 4.64 M/UL — SIGNIFICANT CHANGE UP (ref 4.2–5.4)
RBC # FLD: 15.4 % — HIGH (ref 11.5–14.5)
SODIUM SERPL-SCNC: 141 MMOL/L — SIGNIFICANT CHANGE UP (ref 135–146)
WBC # BLD: 7.89 K/UL — SIGNIFICANT CHANGE UP (ref 4.8–10.8)
WBC # FLD AUTO: 7.89 K/UL — SIGNIFICANT CHANGE UP (ref 4.8–10.8)

## 2019-01-12 RX ORDER — SODIUM HYPOCHLORITE 0.125 %
1 SOLUTION, NON-ORAL MISCELLANEOUS DAILY
Qty: 0 | Refills: 0 | Status: DISCONTINUED | OUTPATIENT
Start: 2019-01-12 | End: 2019-01-14

## 2019-01-12 RX ORDER — COLLAGENASE CLOSTRIDIUM HIST. 250 UNIT/G
1 OINTMENT (GRAM) TOPICAL
Qty: 0 | Refills: 0 | Status: DISCONTINUED | OUTPATIENT
Start: 2019-01-12 | End: 2019-01-14

## 2019-01-12 RX ORDER — COLLAGENASE CLOSTRIDIUM HIST. 250 UNIT/G
1 OINTMENT (GRAM) TOPICAL DAILY
Qty: 0 | Refills: 0 | Status: DISCONTINUED | OUTPATIENT
Start: 2019-01-12 | End: 2019-01-14

## 2019-01-12 RX ADMIN — HEPARIN SODIUM 5000 UNIT(S): 5000 INJECTION INTRAVENOUS; SUBCUTANEOUS at 22:28

## 2019-01-12 RX ADMIN — Medication 1 APPLICATION(S): at 18:27

## 2019-01-12 RX ADMIN — Medication 166.67 MILLIGRAM(S): at 18:27

## 2019-01-12 RX ADMIN — Medication 200 MILLIGRAM(S): at 13:00

## 2019-01-12 RX ADMIN — HEPARIN SODIUM 5000 UNIT(S): 5000 INJECTION INTRAVENOUS; SUBCUTANEOUS at 13:05

## 2019-01-12 RX ADMIN — SODIUM CHLORIDE 75 MILLILITER(S): 9 INJECTION, SOLUTION INTRAVENOUS at 23:45

## 2019-01-12 RX ADMIN — Medication 1 APPLICATION(S): at 11:48

## 2019-01-12 RX ADMIN — Medication 200 MILLIGRAM(S): at 22:29

## 2019-01-12 RX ADMIN — SODIUM CHLORIDE 75 MILLILITER(S): 9 INJECTION, SOLUTION INTRAVENOUS at 16:56

## 2019-01-12 NOTE — CONSULT NOTE ADULT - SUBJECTIVE AND OBJECTIVE BOX
Patient is a 84y old  Female who presents with a chief complaint of Dec PO intake (12 Jan 2019 14:35)      INTERVAL HPI/OVERNIGHT EVENTS:  T(C): 37.1 (01-12-19 @ 13:22), Max: 37.1 (01-12-19 @ 13:22)  HR: 87 (01-12-19 @ 13:22) (78 - 89)  BP: 125/58 (01-12-19 @ 13:22) (94/44 - 130/63)  RR: 18 (01-12-19 @ 13:22) (18 - 18)  SpO2: 98% (01-12-19 @ 09:36) (98% - 98%)  Wt(kg): --  I&O's Summary      PAST MEDICAL & SURGICAL HISTORY:  Bedsore  Pressure ulcer  Bedbound  Nonverbal  Arthritis  High cholesterol  Dementia  HTN (hypertension)  S/P appendectomy      SOCIAL HISTORY  Alcohol:  Tobacco:  Illicit substance use:      FAMILY HISTORY:      LABS:                        11.3   7.89  )-----------( 157      ( 12 Jan 2019 06:41 )             35.7     01-12    141  |  101  |  8<L>  ----------------------------<  260<H>  3.3<L>   |  22  |  0.6<L>    Ca    8.5      12 Jan 2019 06:41  Phos  2.6     01-12  Mg     2.0     01-12          CAPILLARY BLOOD GLUCOSE                MEDICATIONS  (STANDING):  amLODIPine   Tablet 10 milliGRAM(s) Oral daily  ascorbic acid 500 milliGRAM(s) Oral daily  aspirin enteric coated 81 milliGRAM(s) Oral daily  atorvastatin 80 milliGRAM(s) Oral at bedtime  chlorhexidine 4% Liquid 1 Application(s) Topical <User Schedule>  ciprofloxacin   IVPB 400 milliGRAM(s) IV Intermittent every 12 hours  collagenase Ointment 1 Application(s) Topical two times a day  collagenase Ointment 1 Application(s) Topical daily  Dakins Solution - Full Strength 1 Application(s) Topical daily  dextrose 5%. 1000 milliLiter(s) (75 mL/Hr) IV Continuous <Continuous>  docusate sodium 100 milliGRAM(s) Oral two times a day  heparin  Injectable 5000 Unit(s) SubCutaneous every 8 hours  memantine 10 milliGRAM(s) Oral daily  multivitamin 1 Tablet(s) Oral daily  vancomycin  IVPB 1250 milliGRAM(s) IV Intermittent every 12 hours  vancomycin  IVPB      zinc sulfate 220 milliGRAM(s) Oral daily    MEDICATIONS  (PRN):      REVIEW OF SYSTEMS:  CONSTITUTIONAL: No fever, weight loss, or fatigue  EYES: No eye pain, visual disturbances, or discharge  ENMT:  No difficulty hearing, tinnitus, vertigo; No sinus or throat pain  NECK: No pain or stiffness  RESPIRATORY: No cough, wheezing, chills or hemoptysis; No shortness of breath  CARDIOVASCULAR: No chest pain, palpitations, dizziness, or leg swelling  GASTROINTESTINAL: No abdominal or epigastric pain. No nausea, vomiting, or hematemesis; No diarrhea or constipation. No melena or hematochezia.  GENITOURINARY: No dysuria, frequency, hematuria, or incontinence  NEUROLOGICAL: No headaches, memory loss, loss of strength, numbness, or tremors  SKIN: No itching, burning, rashes, or lesions   LYMPH NODES: No enlarged glands  ENDOCRINE: No heat or cold intolerance; No hair loss  MUSCULOSKELETAL: No joint pain or swelling; No muscle, back, or extremity pain  PSYCHIATRIC: No depression, anxiety, mood swings, or difficulty sleeping  HEME/LYMPH: No easy bruising, or bleeding gums  ALLERY AND IMMUNOLOGIC: No hives or eczema    RADIOLOGY & ADDITIONAL TESTS:    Imaging Personally Reviewed:  [ ] YES  [ ] NO    Consultant(s) Notes Reviewed:  [ ] YES  [ ] NO    PHYSICAL EXAM:  GENERAL: NAD, well-groomed, well-developed  HEAD:  Atraumatic, Normocephalic  EYES: EOMI, PERRLA, conjunctiva and sclera clear  ENMT: No tonsillar erythema, exudates, or enlargement; Moist mucous membranes, Good dentition, No lesions  NECK: Supple, No JVD, Normal thyroid  NERVOUS SYSTEM:  Alert & Oriented X3, Good concentration; Motor Strength 5/5 B/L upper and lower extremities; DTRs 2+ intact and symmetric  CHEST/LUNG: Clear to percussion bilaterally; No rales, rhonchi, wheezing, or rubs  HEART: Regular rate and rhythm; No murmurs, rubs, or gallops  ABDOMEN: Soft, Nontender, Nondistended; Bowel sounds present  EXTREMITIES:  2+ Peripheral Pulses, No clubbing, cyanosis, or edema  LYMPH: No lymphadenopathy noted  SKIN: No rashes or lesions    Care Discussed with Consultants/Other Providers [ ] YES  [ ] NO Patient is a 84y old  Female who presents with a chief complaint of Dec PO intake (12 Jan 2019 14:35)      REVIEW OF SYSTEMS: Unable to obtain  due to mental status        PAST MEDICAL & SURGICAL HISTORY:  Bedbound, Pressure ulcer  Nonverbal  Arthritis  High cholesterol  Dementia  HTN (hypertension)  S/P appendectomy        SOCIAL HISTORY  Alcohol: Does not drink  Tobacco: Does not smoke  Illicit substance use:  None        FAMILY HISTORY: Non contributory to the present illness        ALLERGIES: PCN ( Unknown)        T(C): 37.1 (01-12-19 @ 13:22), Max: 37.1 (01-12-19 @ 13:22)  HR: 87 (01-12-19 @ 13:22) (78 - 89)  BP: 125/58 (01-12-19 @ 13:22) (94/44 - 130/63)  RR: 18 (01-12-19 @ 13:22) (18 - 18)  SpO2: 98% (01-12-19 @ 09:36) (98% - 98%)  Wt(kg): --  I&O's Summary      PHYSICAL EXAM:  GENERAL: Not in distress  CHEST/LUNG: Air entry bilaterally  HEART: s1 and s2 present  ABDOMEN: Nondistended  EXTREMITIES:  No pedal edema, Left Hip and sacral bandage in placed  CNS: Awake but not alert          LABS:                        11.3   7.89  )-----------( 157      ( 12 Jan 2019 06:41 )             35.7         01-12    141  |  101  |  8<L>  ----------------------------<  260<H>  3.3<L>   |  22  |  0.6<L>    Ca    8.5      12 Jan 2019 06:41  Phos  2.6     01-12  Mg     2.0     01-12        MEDICATIONS  (STANDING):  amLODIPine   Tablet 10 milliGRAM(s) Oral daily  ascorbic acid 500 milliGRAM(s) Oral daily  aspirin enteric coated 81 milliGRAM(s) Oral daily  atorvastatin 80 milliGRAM(s) Oral at bedtime  chlorhexidine 4% Liquid 1 Application(s) Topical <User Schedule>  ciprofloxacin   IVPB 400 milliGRAM(s) IV Intermittent every 12 hours  collagenase Ointment 1 Application(s) Topical two times a day  collagenase Ointment 1 Application(s) Topical daily  Dakins Solution - Full Strength 1 Application(s) Topical daily  dextrose 5%. 1000 milliLiter(s) (75 mL/Hr) IV Continuous <Continuous>  docusate sodium 100 milliGRAM(s) Oral two times a day  heparin  Injectable 5000 Unit(s) SubCutaneous every 8 hours  memantine 10 milliGRAM(s) Oral daily  multivitamin 1 Tablet(s) Oral daily  vancomycin  IVPB 1250 milliGRAM(s) IV Intermittent every 12 hours  vancomycin  IVPB      zinc sulfate 220 milliGRAM(s) Oral daily    MEDICATIONS  (PRN):        RADIOLOGY & ADDITIONAL TESTS:    < from: Xray Chest 1 View-PORTABLE IMMEDIATE (01.11.19 @ 18:36) >  Left basilar opacity, unchanged.: NG tube should be repositioned..    < end of copied text >    < from: Xray Chest 1 View- PORTABLE-Routine (01.10.19 @ 20:26) >  Retrocardiac atelectasis.Follow-up as needed.      < end of copied text >        MICROBIOLOGY DATA:    Culture - Blood in AM (01.10.19 @ 07:04)    Specimen Source: .Blood None    Culture Results:   No growth to date.

## 2019-01-12 NOTE — PROGRESS NOTE ADULT - ASSESSMENT
84yof with Past Medical History Severe Dementia nonverbal with functional quadriplegia, HTN, Hypercholesteremia, pressure ulcers, and OA admitted to [facility] for decreased oral intake and increased drainage and discoloration to her left sided decubitus ulcer    Stage IV Decubitus Ulcer: admit to medicine.  Consult Burn Surgery-Dr. Gimenez and Infectious Disease-Dr. Yates.  Continue treatment with Cipro and Clindamycin (cannot give Unasyn due to PCN allergy).  Follow-up results from blood cultures.  Continue supportive care with Vitamin C and Zinc Sulfate  local wound care per burn recommendation + possible debridement if family amenable, offloading     Severe Dementia/Failure to Thrive/Hypernatremia: unable to participate with PO intake, NPO after swallow evaluation, NGT needs to be replaced, once in position, please start feedings as recommended by registered dietician. f/u on lytes/na/mg/phos tomorrow    Memantine for dementia.   Hypertension: continue Norvasc    Hypercholesteremia: continue Lipitor    GI/DVT prophylaxis      #)HTN  -c/w amlodipine    #)DLD  -crestor changed to lipitor    #)Dementia  c/w memantine    #)Diet: Soft diet  #)Activtity: OOBTC  #)DVt ppx: Hep SC  #)GI ppx: Not indicated  DISPO- dependent on GOC  high risk

## 2019-01-13 LAB
ANION GAP SERPL CALC-SCNC: 20 MMOL/L — HIGH (ref 7–14)
BUN SERPL-MCNC: 7 MG/DL — LOW (ref 10–20)
CALCIUM SERPL-MCNC: 8.5 MG/DL — SIGNIFICANT CHANGE UP (ref 8.5–10.1)
CHLORIDE SERPL-SCNC: 100 MMOL/L — SIGNIFICANT CHANGE UP (ref 98–110)
CO2 SERPL-SCNC: 19 MMOL/L — SIGNIFICANT CHANGE UP (ref 17–32)
CREAT SERPL-MCNC: 0.8 MG/DL — SIGNIFICANT CHANGE UP (ref 0.7–1.5)
GLUCOSE SERPL-MCNC: 244 MG/DL — HIGH (ref 70–99)
HCT VFR BLD CALC: 36.9 % — LOW (ref 37–47)
HGB BLD-MCNC: 12 G/DL — SIGNIFICANT CHANGE UP (ref 12–16)
MAGNESIUM SERPL-MCNC: 2.1 MG/DL — SIGNIFICANT CHANGE UP (ref 1.8–2.4)
MCHC RBC-ENTMCNC: 24.9 PG — LOW (ref 27–31)
MCHC RBC-ENTMCNC: 32.5 G/DL — SIGNIFICANT CHANGE UP (ref 32–37)
MCV RBC AUTO: 76.6 FL — LOW (ref 81–99)
NRBC # BLD: 0 /100 WBCS — SIGNIFICANT CHANGE UP (ref 0–0)
PHOSPHATE SERPL-MCNC: 2.5 MG/DL — SIGNIFICANT CHANGE UP (ref 2.1–4.9)
PLATELET # BLD AUTO: 198 K/UL — SIGNIFICANT CHANGE UP (ref 130–400)
POTASSIUM SERPL-MCNC: 3.8 MMOL/L — SIGNIFICANT CHANGE UP (ref 3.5–5)
POTASSIUM SERPL-SCNC: 3.8 MMOL/L — SIGNIFICANT CHANGE UP (ref 3.5–5)
RBC # BLD: 4.82 M/UL — SIGNIFICANT CHANGE UP (ref 4.2–5.4)
RBC # FLD: 15.8 % — HIGH (ref 11.5–14.5)
SODIUM SERPL-SCNC: 139 MMOL/L — SIGNIFICANT CHANGE UP (ref 135–146)
WBC # BLD: 8.7 K/UL — SIGNIFICANT CHANGE UP (ref 4.8–10.8)
WBC # FLD AUTO: 8.7 K/UL — SIGNIFICANT CHANGE UP (ref 4.8–10.8)

## 2019-01-13 RX ORDER — POTASSIUM CHLORIDE 20 MEQ
20 PACKET (EA) ORAL ONCE
Qty: 0 | Refills: 0 | Status: COMPLETED | OUTPATIENT
Start: 2019-01-13 | End: 2019-01-13

## 2019-01-13 RX ADMIN — HEPARIN SODIUM 5000 UNIT(S): 5000 INJECTION INTRAVENOUS; SUBCUTANEOUS at 13:06

## 2019-01-13 RX ADMIN — Medication 1 APPLICATION(S): at 11:42

## 2019-01-13 RX ADMIN — Medication 1 APPLICATION(S): at 17:21

## 2019-01-13 RX ADMIN — Medication 1 APPLICATION(S): at 06:10

## 2019-01-13 RX ADMIN — Medication 200 MILLIGRAM(S): at 11:41

## 2019-01-13 RX ADMIN — HEPARIN SODIUM 5000 UNIT(S): 5000 INJECTION INTRAVENOUS; SUBCUTANEOUS at 06:11

## 2019-01-13 RX ADMIN — CHLORHEXIDINE GLUCONATE 1 APPLICATION(S): 213 SOLUTION TOPICAL at 06:10

## 2019-01-13 RX ADMIN — Medication 166.67 MILLIGRAM(S): at 06:10

## 2019-01-13 RX ADMIN — HEPARIN SODIUM 5000 UNIT(S): 5000 INJECTION INTRAVENOUS; SUBCUTANEOUS at 21:17

## 2019-01-13 RX ADMIN — Medication 50 MILLIEQUIVALENT(S): at 04:26

## 2019-01-13 NOTE — PROGRESS NOTE ADULT - ASSESSMENT
84 yof with pmh of severe dementia, bedbound, nonverbal, htn, dld, pressure ulcers, OA was brought in by family due to decrease po intake and c/o drainage with foul smell and tissue darkening around left decubitus ulcer. No fever, chills, SOB, palpitations, headache, dizziness.   she usually eats soft food with assistance and makes moaning sounds,from home; dependent on all ADLs since 4 years; lives with family at home;     01/10: Underwent debridement by Dr. iGmenez on 12/25.. Has been receiving good wound care with Santyl and VNS following. vitals:137/74, 101, 98.9F, 17, 99% on room air,  Labs no inc WBC, sodium of 149. received cipro,clinda, 1 litre bolus in ED (10 Carlos 2019 01:34) GOC: Her family was contacted.  Her children were undecided re: code status, and wished to speak with their father prior to making a decision re: DNR/DNI      01/11: Nursing wound care CS, Dietitian cs, F/u ID, NPO as per S/S, will discus GOC with family.     01/12: no feeds over sunday night, OR on monday, will reattempt to place NG tube. c/w medical management, Vanc and cirpo started, F/u vanc trough    IMPRESSION  Failure to Thrive   PNA vs, atelectasis   Decubitus Ulcers   Metabolic encephalopathy  Possibly 2/2 Hypernatremia   Malnourished     Plan   #)Failure to thrive  #)Weakness, Likely due to metabolic encephalopathy likely due to hypernatremia due to dehydration  -will start her on NS@75/hour  - f/u Dietitian       Plan  #)Decubitus ulcer stage 4 on sacrum with foul smell, no evidence of sepsis  -c/w cipro and clinda,, allergic to penicillin  -Follow up ID   -Follow up burn consult  - Vanc tough before third dose   - cipro started   - wound care management  silvadene cream for barrier protection       #Metabolic encephalopathy  Possibly 2/2 Hypernatremia   - continue d5 75 ml/hr  - f/u at 4pm bmp  - NPO     #)HTN  -c/w amlodipine    #)DLD  -crestor changed to lipitor    #)Dementia  c/w memantine    Electrolyte Imbalances: Correct as needed  [x] Hypokalemia   / []  Hyperkalemia   []  Hyponatremia   /   Hypernatremia  [x]   []  Hypochlorhydria   /    Hypochlorhydria  []   []  Hypomagnesemia   /   Hypermagnesemia  []   []  Hypophosphatemia   /   Hyperphosphatemia  []       GI ppx:                                   [] Not indicated   /   [] Pantoprazole 40mg PO Daily    DVT ppx:  [] Not indicated / [x] Heparin 5000mg SubQ / [] Lovenox 40mg SubQ / [] SCDs    Fluids: NPO  [] PO  |  [X] IVF D5     Activity:  [X] Assisatnce needed   [X] Increase as Tolerated  /  [] OOB w/ assist  /  [] Bed Rest    BMI:17.4 malnourished   Height (cm): 162.56 (01-11)  Weight (kg): 45.9 (01-11)  BMI (kg/m2): 17.4 (01-11)      DISPO:  Patient to be discharged when condition(s) optimized.  [ x] From Home     [ ] NH/SNF   [ ] 4A Rehab  [ ] Detox Clinic  [X] Plan Discussion with patient and/or family. GOC with family awaiting final decision for DC.   [X] Discussed Case and Plan with the Medical Attending.    CODE STATUS  [X] FULL   /    [] DNR/DNI

## 2019-01-14 LAB
APTT BLD: 26 SEC — LOW (ref 27–39.2)
INR BLD: 1.46 RATIO — HIGH (ref 0.65–1.3)
PROTHROM AB SERPL-ACNC: 16.7 SEC — HIGH (ref 9.95–12.87)

## 2019-01-14 RX ORDER — ASPIRIN/CALCIUM CARB/MAGNESIUM 324 MG
81 TABLET ORAL DAILY
Qty: 0 | Refills: 0 | Status: DISCONTINUED | OUTPATIENT
Start: 2019-01-14 | End: 2019-01-17

## 2019-01-14 RX ORDER — AMLODIPINE BESYLATE 2.5 MG/1
10 TABLET ORAL DAILY
Qty: 0 | Refills: 0 | Status: DISCONTINUED | OUTPATIENT
Start: 2019-01-14 | End: 2019-01-15

## 2019-01-14 RX ORDER — METRONIDAZOLE 500 MG
500 TABLET ORAL EVERY 8 HOURS
Qty: 0 | Refills: 0 | Status: DISCONTINUED | OUTPATIENT
Start: 2019-01-14 | End: 2019-01-14

## 2019-01-14 RX ORDER — SODIUM HYPOCHLORITE 0.125 %
1 SOLUTION, NON-ORAL MISCELLANEOUS
Qty: 0 | Refills: 0 | Status: DISCONTINUED | OUTPATIENT
Start: 2019-01-14 | End: 2019-01-15

## 2019-01-14 RX ORDER — SODIUM CHLORIDE 9 MG/ML
1000 INJECTION, SOLUTION INTRAVENOUS
Qty: 0 | Refills: 0 | Status: DISCONTINUED | OUTPATIENT
Start: 2019-01-14 | End: 2019-01-17

## 2019-01-14 RX ORDER — HEPARIN SODIUM 5000 [USP'U]/ML
5000 INJECTION INTRAVENOUS; SUBCUTANEOUS EVERY 8 HOURS
Qty: 0 | Refills: 0 | Status: DISCONTINUED | OUTPATIENT
Start: 2019-01-14 | End: 2019-01-16

## 2019-01-14 RX ORDER — MORPHINE SULFATE 50 MG/1
2 CAPSULE, EXTENDED RELEASE ORAL
Qty: 0 | Refills: 0 | Status: DISCONTINUED | OUTPATIENT
Start: 2019-01-14 | End: 2019-01-15

## 2019-01-14 RX ORDER — METRONIDAZOLE 500 MG
500 TABLET ORAL EVERY 8 HOURS
Qty: 0 | Refills: 0 | Status: DISCONTINUED | OUTPATIENT
Start: 2019-01-14 | End: 2019-01-15

## 2019-01-14 RX ORDER — CHLORHEXIDINE GLUCONATE 213 G/1000ML
1 SOLUTION TOPICAL
Qty: 0 | Refills: 0 | Status: DISCONTINUED | OUTPATIENT
Start: 2019-01-14 | End: 2019-01-15

## 2019-01-14 RX ORDER — COLLAGENASE CLOSTRIDIUM HIST. 250 UNIT/G
1 OINTMENT (GRAM) TOPICAL
Qty: 0 | Refills: 0 | Status: DISCONTINUED | OUTPATIENT
Start: 2019-01-14 | End: 2019-01-15

## 2019-01-14 RX ORDER — ZINC SULFATE TAB 220 MG (50 MG ZINC EQUIVALENT) 220 (50 ZN) MG
220 TAB ORAL DAILY
Qty: 0 | Refills: 0 | Status: DISCONTINUED | OUTPATIENT
Start: 2019-01-14 | End: 2019-01-15

## 2019-01-14 RX ORDER — DOCUSATE SODIUM 100 MG
100 CAPSULE ORAL
Qty: 0 | Refills: 0 | Status: DISCONTINUED | OUTPATIENT
Start: 2019-01-14 | End: 2019-01-15

## 2019-01-14 RX ORDER — ATORVASTATIN CALCIUM 80 MG/1
80 TABLET, FILM COATED ORAL AT BEDTIME
Qty: 0 | Refills: 0 | Status: DISCONTINUED | OUTPATIENT
Start: 2019-01-14 | End: 2019-01-15

## 2019-01-14 RX ORDER — ASCORBIC ACID 60 MG
500 TABLET,CHEWABLE ORAL DAILY
Qty: 0 | Refills: 0 | Status: DISCONTINUED | OUTPATIENT
Start: 2019-01-14 | End: 2019-01-15

## 2019-01-14 RX ORDER — SODIUM CHLORIDE 9 MG/ML
1000 INJECTION, SOLUTION INTRAVENOUS
Qty: 0 | Refills: 0 | Status: DISCONTINUED | OUTPATIENT
Start: 2019-01-14 | End: 2019-01-15

## 2019-01-14 RX ORDER — VANCOMYCIN HCL 1 G
1000 VIAL (EA) INTRAVENOUS EVERY 12 HOURS
Qty: 0 | Refills: 0 | Status: DISCONTINUED | OUTPATIENT
Start: 2019-01-14 | End: 2019-01-14

## 2019-01-14 RX ORDER — MEMANTINE HYDROCHLORIDE 10 MG/1
10 TABLET ORAL DAILY
Qty: 0 | Refills: 0 | Status: DISCONTINUED | OUTPATIENT
Start: 2019-01-14 | End: 2019-01-15

## 2019-01-14 RX ORDER — CEFEPIME 1 G/1
1000 INJECTION, POWDER, FOR SOLUTION INTRAMUSCULAR; INTRAVENOUS EVERY 12 HOURS
Qty: 0 | Refills: 0 | Status: DISCONTINUED | OUTPATIENT
Start: 2019-01-14 | End: 2019-01-14

## 2019-01-14 RX ORDER — CEFEPIME 1 G/1
1000 INJECTION, POWDER, FOR SOLUTION INTRAMUSCULAR; INTRAVENOUS EVERY 12 HOURS
Qty: 0 | Refills: 0 | Status: DISCONTINUED | OUTPATIENT
Start: 2019-01-14 | End: 2019-01-15

## 2019-01-14 RX ADMIN — Medication 166.67 MILLIGRAM(S): at 06:21

## 2019-01-14 RX ADMIN — Medication 1 APPLICATION(S): at 05:03

## 2019-01-14 RX ADMIN — ATORVASTATIN CALCIUM 80 MILLIGRAM(S): 80 TABLET, FILM COATED ORAL at 21:34

## 2019-01-14 RX ADMIN — Medication 100 MILLIGRAM(S): at 13:14

## 2019-01-14 RX ADMIN — CHLORHEXIDINE GLUCONATE 1 APPLICATION(S): 213 SOLUTION TOPICAL at 05:03

## 2019-01-14 RX ADMIN — SODIUM CHLORIDE 75 MILLILITER(S): 9 INJECTION, SOLUTION INTRAVENOUS at 07:59

## 2019-01-14 RX ADMIN — HEPARIN SODIUM 5000 UNIT(S): 5000 INJECTION INTRAVENOUS; SUBCUTANEOUS at 21:34

## 2019-01-14 RX ADMIN — Medication 100 MILLIGRAM(S): at 21:34

## 2019-01-14 RX ADMIN — Medication 200 MILLIGRAM(S): at 09:40

## 2019-01-14 RX ADMIN — HEPARIN SODIUM 5000 UNIT(S): 5000 INJECTION INTRAVENOUS; SUBCUTANEOUS at 05:06

## 2019-01-14 RX ADMIN — SODIUM CHLORIDE 75 MILLILITER(S): 9 INJECTION, SOLUTION INTRAVENOUS at 18:44

## 2019-01-14 RX ADMIN — CEFEPIME 100 MILLIGRAM(S): 1 INJECTION, POWDER, FOR SOLUTION INTRAMUSCULAR; INTRAVENOUS at 19:52

## 2019-01-14 RX ADMIN — SODIUM CHLORIDE 75 MILLILITER(S): 9 INJECTION, SOLUTION INTRAVENOUS at 19:12

## 2019-01-14 NOTE — DISCHARGE NOTE ADULT - HOSPITAL COURSE
HOSPITAL COURSE SUMMERY  84 yof with pmh of severe dementia, bedbound, nonverbal, htn, dld, pressure ulcers, OA presented with failure to thrive, malnutrition, infected bilateral IT stage IV ulcers, a sacral DTI POA, heel ulcer POA, and medial knee skin breakdown poa. Ulcers were debrided and treated with IV abx. Patient was set up with PEG access for feedings for her malnutrition secondary to advanced dementia. She was set up with home services including PEG feedings, home medical equipment home wound-care Patient is hemodynamically stable with no systemic signs of infection and stable for discharge today.     Wound Care wound dressing as follows:  Right and left Trochanter Ulcer: please use Santyl and packing  2-3 daily  DTI sacrum: Xeroform and deshaun  times daily  DTI Right heeel: Foam Pads   Stage 3 Three ulcer inner left Knee: Xeroform and Kerlix 2-3x daily

## 2019-01-14 NOTE — DISCHARGE NOTE ADULT - MEDICATION SUMMARY - MEDICATIONS TO STOP TAKING
I will STOP taking the medications listed below when I get home from the hospital:    collagenase 250 units/g topical ointment  -- Apply on skin to affected area once a day

## 2019-01-14 NOTE — PROGRESS NOTE ADULT - ASSESSMENT
IMPRESSION:  infected sacral ulcer    RECOMMENDATIONS:  Surgery today  Change vancomycin to 1 gm iv q12h  D/c Cipro  Cefepime 1 gm iv q12h  Flagyl 500 mg iv q12h

## 2019-01-14 NOTE — PRE-ANESTHESIA EVALUATION ADULT - NSANTHADDINFOFT_GEN_ALL_CORE
83 y/o WF evaluated for debridement of sacral ulcer.  ASA 3.  Plan GA.  Plan, benefits, foreseeable risks, viable alternatives discussed with son and all his pertinent questions answered and he understands and elects to proceed.

## 2019-01-14 NOTE — DISCHARGE NOTE ADULT - PLAN OF CARE
Evaluation and therapy Ms. Doran is NOn interactive secondary to sever  chronic dementia  and has many failed attempts at feeding via her mouth  please  continue PEG feeds as instructed. Ms. Doran has received debridement and wound care during her stay please continue wound care as instructed at home. Hospitalized with infected stage 4 Bilateral IT ulcers. Please start Po Zyvox 600 mg q12h, Po Levoquin 500 mg q24h, Po Flagyl 500 mg q8h for a total f 9 more days. Please continue PEG feeds as instructed Please use caution when moving patient. off loading Wound Care wound dressing as follows:  Right and left Trochanter Ulcer: please use Santyl and packing  2-3 daily  DTI sacrum: Xeroform and deshaun  times daily  DTI Right heeel: Foam Pads   Stage 3 Three ulcer inner left Knee: Xeroform and Kerlix 2-3x daily A PEG tube was placed during this admission while feeding place head of bed at 45 degrees.  Osmolite 1.0 @240ml x5 feeds + Prosource TF BID, to provide 100% of kcal needs and 104% of protein needs: 1280 kcal, 72g Pro, 1008ml free h2o.   Free h2o flushes per LIP. when feeding head of bed should be at 45 degrees

## 2019-01-14 NOTE — BRIEF OPERATIVE NOTE - PRE-OP DX
Pressure injury of hip, stage 4, unspecified laterality  01/14/2019  stage 4 pressure sore bilateral hips  Active  Abmrose Whitt

## 2019-01-14 NOTE — BRIEF OPERATIVE NOTE - PROCEDURE
<<-----Click on this checkbox to enter Procedure Debridement  01/14/2019  sharp excisional debridement bilateral hips  Active  [id number]

## 2019-01-14 NOTE — CONSULT NOTE ADULT - SUBJECTIVE AND OBJECTIVE BOX
GI HPI: hx taken from chart and son over the phone   84 yof with pmh of severe dementia, bedbound, nonverbal, htn, dld, pressure ulcers, OA was brought in by family due to decrease po intake and c/o drainage with foul smell and tissue darkening around left decubitus ulcer. No fever, chills, SOB, palpitations, headache, dizziness.   she usually eats soft food with assistance and makes moaning sounds, from home; dependent on all ADLs since 4 years; lives with family at home;   As per the son patient not eating for the last few 2 weeks after new year and is been on and off eating before that. Denies any other complaints. Family are agreeable to revoque DNR/ DNI for PEG tube placement      Previous EGD: nothing on records     Previous colonoscopy: nothing on records     PAST MEDICAL & SURGICAL HISTORY  Bedsore  Pressure ulcer  Bedbound  Nonverbal  Arthritis  High cholesterol  Dementia  HTN (hypertension)  S/P appendectomy        SOCIAL HISTORY:  Unable to obtain      FAMILY HISTORY:  FAMILY HISTORY: Unable to obtain       ALLERGIES:  penicillin (Unknown)      MEDICATIONS:  MEDICATIONS  (STANDING):  amLODIPine   Tablet 10 milliGRAM(s) Oral daily  ascorbic acid 500 milliGRAM(s) Oral daily  aspirin enteric coated 81 milliGRAM(s) Oral daily  atorvastatin 80 milliGRAM(s) Oral at bedtime  cefepime   IVPB 1000 milliGRAM(s) IV Intermittent every 12 hours  chlorhexidine 4% Liquid 1 Application(s) Topical <User Schedule>  collagenase Ointment 1 Application(s) Topical daily  collagenase Ointment 1 Application(s) Topical two times a day  Dakins Solution - Full Strength 1 Application(s) Topical daily  dextrose 5%. 1000 milliLiter(s) (75 mL/Hr) IV Continuous <Continuous>  docusate sodium 100 milliGRAM(s) Oral two times a day  heparin  Injectable 5000 Unit(s) SubCutaneous every 8 hours  memantine 10 milliGRAM(s) Oral daily  metroNIDAZOLE  IVPB 500 milliGRAM(s) IV Intermittent every 8 hours  multivitamin 1 Tablet(s) Oral daily  vancomycin  IVPB 1000 milliGRAM(s) IV Intermittent every 12 hours  zinc sulfate 220 milliGRAM(s) Oral daily    MEDICATIONS  (PRN):      HOME MEDICATIONS:  Home Medications:  amLODIPine 10 mg oral tablet: 1 tab(s) orally once a day (24 Dec 2018 13:16)  Aspirin Enteric Coated 81 mg oral delayed release tablet: 1 tab(s) orally once a day (22 Dec 2018 03:07)  docusate sodium 100 mg oral tablet: 1 tab(s) orally 2 times a day (22 Dec 2018 03:58)  Exelon 4.6 mg/24 hr transdermal film, extended release: 1 patch transdermal once a day (22 Dec 2018 03:03)  memantine 10 mg oral tablet: 1 tab(s) orally once a day (22 Dec 2018 03:01)  rosuvastatin 20 mg oral tablet: 1 tab(s) orally once a day (at bedtime) (22 Dec 2018 03:01)      ROS:   Unable to assess          VITALS:   T(F): 97.7 (01-14 @ 13:14), Max: 98.8 (01-12 @ 13:22)  HR: 84 (01-14 @ 13:14) (78 - 89)  BP: 111/46 (01-14 @ 13:14) (94/44 - 147/71)  BP(mean): --  RR: 16 (01-14 @ 13:14) (16 - 18)  SpO2: 96% (01-14 @ 00:32) (95% - 98%)    I&O's Summary    14 Jan 2019 07:01  -  14 Jan 2019 14:43  --------------------------------------------------------  IN: 525 mL / OUT: 0 mL / NET: 525 mL        PHYSICAL EXAM:  GENERAL:  Appears in no distress  HEENT:  Conjunctivae  anicteric  CHEST:  Full & symmetric excursion  HEART:  N S1, S2  ABDOMEN:  Soft, non-tender, non-distended, no masses   EXTEREMITIES:  no  edema  SKIN:  No rash          LABS:                        12.0   8.70  )-----------( 198      ( 13 Jan 2019 07:41 )             36.9       LIVER FUNCTIONS - ( 09 Jan 2019 17:36 )  Alb: 3.5 g/dL / Pro: 7.3 g/dL / ALK PHOS: 87 U/L / ALT: 74 U/L / AST: 59 U/L / GGT: x           01-13    139  |  100  |  7<L>  ----------------------------<  244<H>  3.8   |  19  |  0.8    Ca    8.5      13 Jan 2019 07:41  Phos  2.5     01-13  Mg     2.1     01-13

## 2019-01-14 NOTE — DISCHARGE NOTE ADULT - CARE PROVIDERS DIRECT ADDRESSES
,DirectAddress_Unknown ,DirectAddress_Unknown,[email or web address]

## 2019-01-14 NOTE — DISCHARGE NOTE ADULT - CARE PROVIDER_API CALL
Eliazar Dey MD  [address] [place] [address]  Phone: [phone]  Fax: (   )    - Eliazar Dey MD  [address] [place] [address]  Phone: [phone]  Fax: (   )    -    Thanh Gimenez (MD), Plastic Surgery  [address] [place] [address]  Phone: [phone]  Fax: [phone]

## 2019-01-14 NOTE — CHART NOTE - NSCHARTNOTEFT_GEN_A_CORE
PACU ANESTHESIA ADMISSION NOTE      Procedure: Debridement: sharp excisional debridement bilateral hips    Post op diagnosis:  Pressure injury of hip, stage 4, unspecified laterality      ____  Intubated  TV:______       Rate: ______      FiO2: ______    _x___  Patent Airway    _x___  Full return of protective reflexes    ___  Full recovery from anesthesia / back to baseline status    Vitals:  T36.5  HR: 80  BP: 110/86  RR: 21  SpO2: 99%    Mental Status:  _x___ Awake   _____ Alert   _____ Drowsy   _____ Sedated    Nausea/Vomiting:  _x___  NO       ______Yes,   See Post - Op Orders         Pain Scale (0-10):  __0___    Treatment: _x___ None    ____ See Post - Op/PCA Orders    Post - Operative Fluids:   __x__ Oral   ____ See Post - Op Orders    Plan: Discharge:   ____Home       _x___Floor     _____Critical Care    _____  Other:_________________    Comments: Spontaneously breathing VSS  No anesthesia issues or complications noted.  Discharge when criteria met.

## 2019-01-14 NOTE — DISCHARGE NOTE ADULT - PROVIDER TOKENS
FREE:[LAST:[Yissel],FIRST:[Eliazar CHERY],PHONE:[phone]],FAX:[(   )    -],ADDRESS:[[address] [place] [address]]] FREE:[LAST:[Yissel],FIRST:[Eliazar CHERY],PHONE:[phone]],FAX:[(   )    -],ADDRESS:[[address] [place] [address]]],TOKEN:'[email or web address]'

## 2019-01-14 NOTE — DISCHARGE NOTE ADULT - INSTRUCTIONS
Osmolite 1.0 @240ml x5 feeds + Prosource TF BID, to provide 100% of kcal needs and 104% of protein needs: 1280 kcal, 72g Pro, 1008ml free h2o.   Free h2o flushes per LIP. Head of bed at 45 degree when feeding

## 2019-01-14 NOTE — DISCHARGE NOTE ADULT - CARE PLAN
Principal Discharge DX:	Failure to thrive in adult  Goal:	Evaluation and therapy  Assessment and plan of treatment:	Ms. Doran is NOn interactive secondary to sever  chronic dementia  and has many failed attempts at feeding via her mouth  please  continue PEG feeds as instructed.  Secondary Diagnosis:	Decubitus ulcer  Goal:	Evaluation and therapy  Assessment and plan of treatment:	Ms. Doran has received debridement and wound care during her stay please continue wound care as instructed at home. Hospitalized with infected stage 4 Bilateral IT ulcers. Please start Po Zyvox 600 mg q12h, Po Levoquin 500 mg q24h, Po Flagyl 500 mg q8h for a total f 9 more days.  Secondary Diagnosis:	Malnutrition  Goal:	Evaluation and therapy  Assessment and plan of treatment:	Please continue PEG feeds as instructed  Secondary Diagnosis:	Functional quadriplegia  Goal:	Evaluation and therapy  Assessment and plan of treatment:	Please use caution when moving patient. off loading  Secondary Diagnosis:	Pressure ulcer  Goal:	Evaluation and therapy  Assessment and plan of treatment:	Wound Care wound dressing as follows:  Right and left Trochanter Ulcer: please use Santyl and packing  2-3 daily  DTI sacrum: Xeroform and deshaun  times daily  DTI Right heeel: Foam Pads   Stage 3 Three ulcer inner left Knee: Xeroform and Kerlix 2-3x daily  Secondary Diagnosis:	PEG (percutaneous endoscopic gastrostomy) status  Goal:	Evaluation and therapy  Assessment and plan of treatment:	A PEG tube was placed during this admission while feeding place head of bed at 45 degrees.  Osmolite 1.0 @240ml x5 feeds + Prosource TF BID, to provide 100% of kcal needs and 104% of protein needs: 1280 kcal, 72g Pro, 1008ml free h2o.   Free h2o flushes per LIP.  Secondary Diagnosis:	Aspiration precautions  Goal:	Evaluation and therapy  Assessment and plan of treatment:	when feeding head of bed should be at 45 degrees

## 2019-01-14 NOTE — CONSULT NOTE ADULT - ASSESSMENT
84 yof with pmh of severe dementia, bedbound, nonverbal, htn, dld, pressure ulcers, OA was brought in by family due to decrease po intake and c/o drainage with foul smell and tissue darkening around left decubitus ulcer. No fever, chills, SOB, palpitations, headache, dizziness.   she usually eats soft food with assistance and makes moaning sounds, from home; dependent on all ADLs since 6 years; lives with family at home;   As per the son patient not eating for the last few 2 weeks after new year and is been on and off eating before that. Denies any other complaints. Family are agreeable to revoque DNR/ DNI for PEG tube placement      Previous EGD: nothing on records     Previous colonoscopy: nothing on records     # PEG tube placement for failure to thrive because of dementia   Discussed with family and they agree for PEG tube placement and revoque DNR/ DNI for the procedure    Patient not on AC   No signs of sepsis   Will discuss with attending for scheduling
Deteriorated wounds  REc - debridement of trochanteric wounds  Dakin's wet to dry dressing to trochanteric wounds bid ; hydrogel to sacral wound   Will discuss debridemetn with family
# Encephalopathy  # Infected decubitus ulcer - s/p debridement but no culture seen in the system  # PCN Allergy    Would recommend:    1. Continue Cipro, Vancomycin and keep level between 15 to 20  2. Add Flagyl for anaerobic coverage  3. Continue Wound care  4. Frequent repositioning  5. Aspiration precaution    will follow the patient with you and make further recommendation based on the clinical course and Lab results  Thank you for the opportunity to participate in Ms. TEIXEIRA's care

## 2019-01-14 NOTE — DISCHARGE NOTE ADULT - SECONDARY DIAGNOSIS.
Decubitus ulcer Malnutrition Functional quadriplegia Pressure ulcer PEG (percutaneous endoscopic gastrostomy) status Aspiration precautions

## 2019-01-14 NOTE — BRIEF OPERATIVE NOTE - POST-OP DX
Pressure injury of hip, stage 4, unspecified laterality  01/14/2019  stage 4 pressure sore bilatral hips  Active  Ambrose Whitt

## 2019-01-14 NOTE — PROGRESS NOTE ADULT - ASSESSMENT
84 yof with pmh of severe dementia, bedbound, nonverbal, htn, dld, pressure ulcers, OA was brought in by family due to decrease po intake and c/o drainage with foul smell and tissue darkening around left decubitus ulcer. No fever, chills, SOB, palpitations, headache, dizziness.   she usually eats soft food with assistance and makes moaning sounds,from home; dependent on all ADLs since 6 years; lives with family at home;     01/10: Underwent debridement by Dr. Gimenez on 12/25.. Has been receiving good wound care with Santyl and VNS following. vitals:137/74, 101, 98.9F, 17, 99% on room air,  Labs no inc WBC, sodium of 149. received cipro,clinda, 1 litre bolus in ED (10 Carlos 2019 01:34) GOC: Her family was contacted.  Her children were undecided re: code status, and wished to speak with their father prior to making a decision re: DNR/DNI      01/11: Nursing wound care CS, Dietitian cs, F/u ID, NPO as per S/S, will discus GOC with family.     01/12: no feeds over sunday night, OR on monday, will reattempt to place NG tube. c/w medical management, Vanc and cirpo started, F/u vanc trough    01/14:  Failed NG tube placement, PEG tube placement for Wednesday, NO PO intake, OR for debridement today, Very lethargic, Spoke with family who want PEg placed, for chronic decline and decreased PO intake.        IMPRESSION  Failure to Thrive   PNA vs, atelectasis   Decubitus Ulcers   Metabolic encephalopathy  Possibly 2/2 Hypernatremia   Malnourished     Plan   #)Failure to thrive  #)Weakness, Likely due to metabolic encephalopathy likely due to hypernatremia due to dehydration  -will start her on NS@75/hour  - f/u Dietitian       Plan  #)Decubitus ulcer stage 4 on sacrum with foul smell, no evidence of sepsis  -c/w cipro and clinda,, allergic to penicillin  -Follow up ID recs appreciated DC Cipro started Vanc and  cefepim  -Follow up burn consult - OR today   - wound care management  silvadene cream for barrier protection       #Metabolic encephalopathy  Possibly 2/2 Hypernatremia   - continue d5 75 ml/hr  - f/u at 4pm bmp  - NPO     #)HTN  -c/w amlodipine    #)DLD  -crestor changed to lipitor    #)Dementia  c/w memantine    Electrolyte Imbalances: Correct as needed  [x] Hypokalemia   / []  Hyperkalemia   []  Hyponatremia   /   Hypernatremia  [x]   []  Hypochlorhydria   /    Hypochlorhydria  []   []  Hypomagnesemia   /   Hypermagnesemia  []   []  Hypophosphatemia   /   Hyperphosphatemia  []       GI ppx:                                   [] Not indicated   /   [] Pantoprazole 40mg PO Daily    DVT ppx:  [] Not indicated / [x] Heparin 5000mg SubQ / [] Lovenox 40mg SubQ / [] SCDs    Fluids: NPO  [] PO  |  [X] IVF D5     Activity:  [X] Assisatnce needed   [X] Increase as Tolerated  /  [] OOB w/ assist  /  [] Bed Rest    BMI:17.4 malnourished   Height (cm): 162.56 (01-11)  Weight (kg): 45.9 (01-11)  BMI (kg/m2): 17.4 (01-11)      DISPO:  Patient to be discharged when condition(s) optimized.  [ x] From Home     [ ] NH/SNF   [ ] 4A Rehab  [ ] Detox Clinic  [X] Plan Discussion with patient and/or family. GOC with family awaiting final decision for DC.   [X] Discussed Case and Plan with the Medical Attending.    CODE STATUS  [] FULL   /    [X] DNR/DNI

## 2019-01-14 NOTE — PROGRESS NOTE ADULT - ASSESSMENT
84 yof with pmh of severe dementia, bedbound, nonverbal, htn, dld, pressure ulcers, OA was brought in by family due to decrease po intake and c/o drainage with foul smell and tissue darkening around left decubitus ulcer. No fever, chills, SOB, palpitations, headache, dizziness.   she usually eats soft food with assistance and makes moaning sounds,from home; dependent on all ADLs since 6 years; lives with family at home;     01/10: Underwent debridement by Dr. Gimenez on 12/25.. Has been receiving good wound care with Santyl and VNS following. vitals:137/74, 101, 98.9F, 17, 99% on room air,  Labs no inc WBC, sodium of 149. received cipro,clinda, 1 litre bolus in ED (10 Carlos 2019 01:34) GOC: Her family was contacted.  Her children were undecided re: code status, and wished to speak with their father prior to making a decision re: DNR/DNI      01/11: Nursing wound care CS, Dietitian cs, F/u ID, NPO as per S/S, will discus GOC with family.     01/12: no feeds over sunday night, OR on monday, will reattempt to place NG tube. c/w medical management, Vanc and cirpo started, F/u vanc trough    IMPRESSION  Failure to Thrive   PNA vs, atelectasis   Decubitus Ulcers   Metabolic encephalopathy  Possibly 2/2 Hypernatremia   Malnourished     Plan   #)Failure to thrive 2/2 Progressive /advanced Dementia    #)AMS 2/2  metabolic encephalopathy  likely due to hypernatremia from  dehydration 2/2 decreased PO intake.  - on NS@75/hour  - GOC discussed with the family : Agreeable with PEG if feasible/indicated. >> F/U GI input.    #)Decubitus ulcer stage 4 on sacrum with foul smell, no evidence of sepsis  -c/w cipro and clinda,, allergic to penicillin  -Follow up ID   -Follow up burn consult  - Vanc tough before third dose   - cipro started   - wound care management  silvadene cream for barrier protection     #)HTN  -c/w amlodipine    #)DLD  -crestor changed to lipitor    #)Dementia  c/w memantine    Electrolyte Imbalances: Correct as needed      GI ppx:                                     DVT ppx:  [x] Heparin 5000mg SubQ     BMI:17.4 malnourished   Height (cm): 162.56 (01-11)  Weight (kg): 45.9 (01-11)  BMI (kg/m2): 17.4 (01-11)      DISPO:  Patient to be discharged when condition(s) optimized.  [ x] From Home         CODE STATUS  [X] FULL

## 2019-01-14 NOTE — DISCHARGE NOTE ADULT - MEDICATION SUMMARY - MEDICATIONS TO TAKE
I will START or STAY ON the medications listed below when I get home from the hospital:    metroNIDAZOLE 500 mg oral tablet  -- 1 tab(s) by mouth every 8 hours  -- Indication: For Infected ulcers     Aspirin Enteric Coated 81 mg oral delayed release tablet  -- 1 tab(s) by mouth once a day  -- Indication: For heart Health     rosuvastatin 20 mg oral tablet  -- 1 tab(s) by mouth once a day (at bedtime)  -- Indication: For Hyperlipidemia     amLODIPine 10 mg oral tablet  -- 1 tab(s) by mouth once a day  -- Indication: For HTN    Exelon 4.6 mg/24 hr transdermal film, extended release  -- 1 patch by transdermal patch once a day  -- Indication: For Dementia     docusate sodium 100 mg oral tablet  -- 1 tab(s) by mouth 2 times a day  -- Indication: For Chronic constipation     memantine 10 mg oral tablet  -- 1 tab(s) by mouth once a day  -- Indication: For Dementia     Zyvox 600 mg oral tablet  -- 1 tab(s) by mouth 2 times a day   -- Avoid chocolate, wine and cheese while taking this medication.  Finish all this medication unless otherwise directed by prescriber.  Obtain medical advice before taking any non-prescription drugs as some may affect the action of this medication.    -- Indication: For Infected Ulcers    levoFLOXacin 500 mg oral tablet  -- 1 tab(s) by mouth every 24 hours  -- Indication: For Infected Ulcers     ascorbic acid 500 mg oral tablet  -- 1 tab(s) by mouth once a day  -- Indication: For Vitamin

## 2019-01-15 LAB
ANION GAP SERPL CALC-SCNC: 20 MMOL/L — HIGH (ref 7–14)
BUN SERPL-MCNC: 12 MG/DL — SIGNIFICANT CHANGE UP (ref 10–20)
CALCIUM SERPL-MCNC: 8.6 MG/DL — SIGNIFICANT CHANGE UP (ref 8.5–10.1)
CHLORIDE SERPL-SCNC: 102 MMOL/L — SIGNIFICANT CHANGE UP (ref 98–110)
CO2 SERPL-SCNC: 22 MMOL/L — SIGNIFICANT CHANGE UP (ref 17–32)
CREAT SERPL-MCNC: 1.2 MG/DL — SIGNIFICANT CHANGE UP (ref 0.7–1.5)
CULTURE RESULTS: SIGNIFICANT CHANGE UP
GLUCOSE BLDC GLUCOMTR-MCNC: 239 MG/DL — HIGH (ref 70–99)
GLUCOSE BLDC GLUCOMTR-MCNC: 261 MG/DL — HIGH (ref 70–99)
GLUCOSE BLDC GLUCOMTR-MCNC: 267 MG/DL — HIGH (ref 70–99)
GLUCOSE BLDC GLUCOMTR-MCNC: 275 MG/DL — HIGH (ref 70–99)
GLUCOSE SERPL-MCNC: 233 MG/DL — HIGH (ref 70–99)
HCT VFR BLD CALC: 36.3 % — LOW (ref 37–47)
HGB BLD-MCNC: 11.3 G/DL — LOW (ref 12–16)
MAGNESIUM SERPL-MCNC: 2 MG/DL — SIGNIFICANT CHANGE UP (ref 1.8–2.4)
MCHC RBC-ENTMCNC: 24.4 PG — LOW (ref 27–31)
MCHC RBC-ENTMCNC: 31.1 G/DL — LOW (ref 32–37)
MCV RBC AUTO: 78.4 FL — LOW (ref 81–99)
NRBC # BLD: 0 /100 WBCS — SIGNIFICANT CHANGE UP (ref 0–0)
PHOSPHATE SERPL-MCNC: 3 MG/DL — SIGNIFICANT CHANGE UP (ref 2.1–4.9)
PLATELET # BLD AUTO: 206 K/UL — SIGNIFICANT CHANGE UP (ref 130–400)
POTASSIUM SERPL-MCNC: 3.5 MMOL/L — SIGNIFICANT CHANGE UP (ref 3.5–5)
POTASSIUM SERPL-SCNC: 3.5 MMOL/L — SIGNIFICANT CHANGE UP (ref 3.5–5)
RBC # BLD: 4.63 M/UL — SIGNIFICANT CHANGE UP (ref 4.2–5.4)
RBC # FLD: 15.9 % — HIGH (ref 11.5–14.5)
SODIUM SERPL-SCNC: 144 MMOL/L — SIGNIFICANT CHANGE UP (ref 135–146)
SPECIMEN SOURCE: SIGNIFICANT CHANGE UP
WBC # BLD: 7.46 K/UL — SIGNIFICANT CHANGE UP (ref 4.8–10.8)
WBC # FLD AUTO: 7.46 K/UL — SIGNIFICANT CHANGE UP (ref 4.8–10.8)

## 2019-01-15 PROCEDURE — 93971 EXTREMITY STUDY: CPT | Mod: 26

## 2019-01-15 RX ORDER — ATORVASTATIN CALCIUM 80 MG/1
80 TABLET, FILM COATED ORAL AT BEDTIME
Qty: 0 | Refills: 0 | Status: DISCONTINUED | OUTPATIENT
Start: 2019-01-15 | End: 2019-01-17

## 2019-01-15 RX ORDER — ZINC SULFATE TAB 220 MG (50 MG ZINC EQUIVALENT) 220 (50 ZN) MG
220 TAB ORAL DAILY
Qty: 0 | Refills: 0 | Status: DISCONTINUED | OUTPATIENT
Start: 2019-01-15 | End: 2019-01-17

## 2019-01-15 RX ORDER — MEMANTINE HYDROCHLORIDE 10 MG/1
10 TABLET ORAL DAILY
Qty: 0 | Refills: 0 | Status: DISCONTINUED | OUTPATIENT
Start: 2019-01-15 | End: 2019-01-17

## 2019-01-15 RX ORDER — DOCUSATE SODIUM 100 MG
100 CAPSULE ORAL DAILY
Qty: 0 | Refills: 0 | Status: DISCONTINUED | OUTPATIENT
Start: 2019-01-15 | End: 2019-01-17

## 2019-01-15 RX ORDER — COLLAGENASE CLOSTRIDIUM HIST. 250 UNIT/G
1 OINTMENT (GRAM) TOPICAL
Qty: 0 | Refills: 0 | Status: DISCONTINUED | OUTPATIENT
Start: 2019-01-15 | End: 2019-01-18

## 2019-01-15 RX ORDER — METRONIDAZOLE 500 MG
500 TABLET ORAL EVERY 8 HOURS
Qty: 0 | Refills: 0 | Status: DISCONTINUED | OUTPATIENT
Start: 2019-01-15 | End: 2019-01-17

## 2019-01-15 RX ORDER — SODIUM HYPOCHLORITE 0.125 %
1 SOLUTION, NON-ORAL MISCELLANEOUS
Qty: 0 | Refills: 0 | Status: DISCONTINUED | OUTPATIENT
Start: 2019-01-15 | End: 2019-01-18

## 2019-01-15 RX ORDER — CEFEPIME 1 G/1
1000 INJECTION, POWDER, FOR SOLUTION INTRAMUSCULAR; INTRAVENOUS EVERY 12 HOURS
Qty: 0 | Refills: 0 | Status: DISCONTINUED | OUTPATIENT
Start: 2019-01-15 | End: 2019-01-15

## 2019-01-15 RX ORDER — CEFEPIME 1 G/1
1000 INJECTION, POWDER, FOR SOLUTION INTRAMUSCULAR; INTRAVENOUS EVERY 24 HOURS
Qty: 0 | Refills: 0 | Status: DISCONTINUED | OUTPATIENT
Start: 2019-01-15 | End: 2019-01-17

## 2019-01-15 RX ORDER — AMLODIPINE BESYLATE 2.5 MG/1
10 TABLET ORAL DAILY
Qty: 0 | Refills: 0 | Status: DISCONTINUED | OUTPATIENT
Start: 2019-01-15 | End: 2019-01-17

## 2019-01-15 RX ORDER — ASCORBIC ACID 60 MG
500 TABLET,CHEWABLE ORAL DAILY
Qty: 0 | Refills: 0 | Status: DISCONTINUED | OUTPATIENT
Start: 2019-01-15 | End: 2019-01-18

## 2019-01-15 RX ORDER — DOCUSATE SODIUM 100 MG
100 CAPSULE ORAL
Qty: 0 | Refills: 0 | Status: DISCONTINUED | OUTPATIENT
Start: 2019-01-15 | End: 2019-01-15

## 2019-01-15 RX ORDER — CHLORHEXIDINE GLUCONATE 213 G/1000ML
1 SOLUTION TOPICAL
Qty: 0 | Refills: 0 | Status: DISCONTINUED | OUTPATIENT
Start: 2019-01-15 | End: 2019-01-18

## 2019-01-15 RX ADMIN — CEFEPIME 100 MILLIGRAM(S): 1 INJECTION, POWDER, FOR SOLUTION INTRAMUSCULAR; INTRAVENOUS at 23:23

## 2019-01-15 RX ADMIN — Medication 100 MILLIGRAM(S): at 14:08

## 2019-01-15 RX ADMIN — ATORVASTATIN CALCIUM 80 MILLIGRAM(S): 80 TABLET, FILM COATED ORAL at 21:54

## 2019-01-15 RX ADMIN — Medication 1 APPLICATION(S): at 06:11

## 2019-01-15 RX ADMIN — CEFEPIME 100 MILLIGRAM(S): 1 INJECTION, POWDER, FOR SOLUTION INTRAMUSCULAR; INTRAVENOUS at 06:10

## 2019-01-15 RX ADMIN — HEPARIN SODIUM 5000 UNIT(S): 5000 INJECTION INTRAVENOUS; SUBCUTANEOUS at 06:10

## 2019-01-15 RX ADMIN — HEPARIN SODIUM 5000 UNIT(S): 5000 INJECTION INTRAVENOUS; SUBCUTANEOUS at 14:08

## 2019-01-15 RX ADMIN — HEPARIN SODIUM 5000 UNIT(S): 5000 INJECTION INTRAVENOUS; SUBCUTANEOUS at 21:55

## 2019-01-15 RX ADMIN — Medication 100 MILLIGRAM(S): at 06:10

## 2019-01-15 RX ADMIN — Medication 1 APPLICATION(S): at 18:09

## 2019-01-15 RX ADMIN — CEFEPIME 100 MILLIGRAM(S): 1 INJECTION, POWDER, FOR SOLUTION INTRAMUSCULAR; INTRAVENOUS at 18:08

## 2019-01-15 RX ADMIN — Medication 100 MILLIGRAM(S): at 21:55

## 2019-01-15 RX ADMIN — CHLORHEXIDINE GLUCONATE 1 APPLICATION(S): 213 SOLUTION TOPICAL at 06:10

## 2019-01-15 RX ADMIN — Medication 1 APPLICATION(S): at 18:10

## 2019-01-15 NOTE — PROGRESS NOTE ADULT - ASSESSMENT
84 yof with pmh of severe dementia, bedbound, nonverbal, htn, dld, pressure ulcers, OA was brought in by family due to decrease po intake and c/o drainage with foul smell and tissue darkening around left decubitus ulcer. No fever, chills, SOB, palpitations, headache, dizziness.   she usually eats soft food with assistance and makes moaning sounds,from home; dependent on all ADLs since 4 years; lives with family at home;     01/10: Underwent debridement by Dr. Gimenez on 12/25.. Has been receiving good wound care with Santyl and VNS following. vitals:137/74, 101, 98.9F, 17, 99% on room air,  Labs no inc WBC, sodium of 149. received cipro,clinda, 1 litre bolus in ED (10 Carlos 2019 01:34) GOC: Her family was contacted.  Her children were undecided re: code status, and wished to speak with their father prior to making a decision re: DNR/DNI      01/11: Nursing wound care CS, Dietitian cs, F/u ID, NPO as per S/S, will discus GOC with family.     01/12: no feeds over sunday night, OR on monday, will reattempt to place NG tube. c/w medical management, Vanc and cirpo started, F/u vanc trough    01/14:  Failed NG tube placement, PEG tube placement for Wednesday, NO PO intake, OR for debridement today, Very lethargic, Spoke with family who want PEg placed, for chronic decline and decreased PO intake.      01/15: OR tomorrow for PEg tube, NG tube placed receiving feeds,   stop feeding at 12am, witching to PO meds.     IMPRESSION  Failure to Thrive   PNA vs, atelectasis   Decubitus Ulcers   Metabolic encephalopathy  Possibly 2/2 Hypernatremia   Malnourished     Plan   #)Failure to thrive  #)Weakness, Likely due to metabolic encephalopathy likely due to hypernatremia due to dehydration  - will start her on NS@75/hour  - s.p NG placement 01/15  - f/u Dietitian   - PEG tomorrow       Plan  #)Decubitus ulcer stage 4 on sacrum with foul smell, no evidence of sepsis  -c/w cipro and clinda,, allergic to penicillin  - Follow up ID recs appreciated DC Cipro started Vanc and  cefepim  - Follow up burn consult - s/p OR  01/14  - wound care management  silvadene cream for barrier protection         #Metabolic encephalopathy  Possibly 2/2 Hypernatremia   - continue d5 75 ml/hr  - f/u at 4pm bmp  - NPO     #)HTN  -c/w amlodipine    #)DLD  -crestor changed to lipitor    #)Dementia  c/w memantine    Electrolyte Imbalances: Correct as needed  [x] Hypokalemia   / []  Hyperkalemia   []  Hyponatremia   /   Hypernatremia  [x]   []  Hypochlorhydria   /    Hypochlorhydria  []   []  Hypomagnesemia   /   Hypermagnesemia  []   []  Hypophosphatemia   /   Hyperphosphatemia  []       GI ppx:                                   [] Not indicated   /   [] Pantoprazole 40mg PO Daily    DVT ppx:  [] Not indicated / [x] Heparin 5000mg SubQ / [] Lovenox 40mg SubQ / [] SCDs    Fluids: NPO  [] PO  |  [X] IVF D5     Activity:  [X] Assisatnce needed   [X] Increase as Tolerated  /  [] OOB w/ assist  /  [] Bed Rest    BMI:17.4 malnourished   Height (cm): 162.56 (01-11)  Weight (kg): 45.9 (01-11)  BMI (kg/m2): 17.4 (01-11)      DISPO:  Patient to be discharged when condition(s) optimized.  [ x] From Home     [ ] NH/SNF   [ ] 4A Rehab  [ ] Detox Clinic  [X] Plan Discussion with patient and/or family. C patient made DNR/DNI  [X] Discussed Case and Plan with the Medical Attending.    CODE STATUS  [] FULL   /    [X] DNR/DNI 84 yof with pmh of severe dementia, bedbound, nonverbal, htn, dld, pressure ulcers, OA was brought in by family due to decrease po intake and c/o drainage with foul smell and tissue darkening around left decubitus ulcer. No fever, chills, SOB, palpitations, headache, dizziness.   she usually eats soft food with assistance and makes moaning sounds,from home; dependent on all ADLs since 6 years; lives with family at home;     01/10: Underwent debridement by Dr. Gimenez on 12/25.. Has been receiving good wound care with Santyl and VNS following. vitals:137/74, 101, 98.9F, 17, 99% on room air,  Labs no inc WBC, sodium of 149. received cipro,clinda, 1 litre bolus in ED (10 Carlos 2019 01:34) GOC: Her family was contacted.  Her children were undecided re: code status, and wished to speak with their father prior to making a decision re: DNR/DNI      01/11: Nursing wound care CS, Dietitian cs, F/u ID, NPO as per S/S, will discus GOC with family.     01/12: no feeds over sunday night, OR on monday, will reattempt to place NG tube. c/w medical management, Vanc and cirpo started, F/u vanc trough    01/14:  Failed NG tube placement, PEG tube placement for Wednesday, NO PO intake, OR for debridement today, Very lethargic, Spoke with family who want PEg placed, for chronic decline and decreased PO intake.      01/15: OR tomorrow for PEg tube, NG tube placed receiving feeds,   stop feeding at 12am, switching to PO meds.     IMPRESSION  Failure to Thrive   PNA vs, atelectasis   Decubitus Ulcers   Metabolic encephalopathy  Possibly 2/2 Hypernatremia   Malnourished     Plan   #)Failure to thrive  #)Weakness, Likely due to metabolic encephalopathy likely due to hypernatremia due to dehydration  - will start her on NS@75/hour  - s.p NG placement 01/15  - f/u Dietitian   - PEG tomorrow       Plan  #)Decubitus ulcer stage 4 on sacrum with foul smell, no evidence of sepsis  -c/w cipro and clinda,, allergic to penicillin  - Follow up ID recs appreciated DC Cipro started Vanc and  cefepim  - Follow up burn consult - s/p OR  01/14  - wound care management  silvadene cream for barrier protection         #Metabolic encephalopathy  Possibly 2/2 Hypernatremia   - continue d5 75 ml/hr  - f/u at 4pm bmp  - NPO     #)HTN  -c/w amlodipine    #)DLD  -crestor changed to lipitor    #)Dementia  c/w memantine    Electrolyte Imbalances: Correct as needed  [x] Hypokalemia   / []  Hyperkalemia   []  Hyponatremia   /   Hypernatremia  [x]   []  Hypochlorhydria   /    Hypochlorhydria  []   []  Hypomagnesemia   /   Hypermagnesemia  []   []  Hypophosphatemia   /   Hyperphosphatemia  []       GI ppx:                                   [] Not indicated   /   [] Pantoprazole 40mg PO Daily    DVT ppx:  [] Not indicated / [x] Heparin 5000mg SubQ / [] Lovenox 40mg SubQ / [] SCDs    Fluids: NPO  [] PO  |  [X] IVF D5     Activity:  [X] Assisatnce needed   [X] Increase as Tolerated  /  [] OOB w/ assist  /  [] Bed Rest    BMI:17.4 malnourished   Height (cm): 162.56 (01-11)  Weight (kg): 45.9 (01-11)  BMI (kg/m2): 17.4 (01-11)      DISPO:  Patient to be discharged when condition(s) optimized.  [ x] From Home     [ ] NH/SNF   [ ] 4A Rehab  [ ] Detox Clinic  [X] Plan Discussion with patient and/or family. C patient made DNR/DNI  [X] Discussed Case and Plan with the Medical Attending.    CODE STATUS  [] FULL   /    [X] DNR/DNI

## 2019-01-15 NOTE — PROGRESS NOTE ADULT - ASSESSMENT
84 yof with pmh of severe dementia, bedbound, nonverbal, htn, dld, pressure ulcers, OA was brought in by family due to decrease po intake and c/o drainage with foul smell and tissue darkening around left decubitus ulcer. No fever, chills, SOB, palpitations, headache, dizziness.   she usually eats soft food with assistance and makes moaning sounds,from home; dependent on all ADLs since 4 years; lives with family at home;     01/10: Underwent debridement by Dr. Gimenez on 12/25.. Has been receiving good wound care with Santyl and VNS following. vitals:137/74, 101, 98.9F, 17, 99% on room air,  Labs no inc WBC, sodium of 149. received cipro,clinda, 1 litre bolus in ED (10 Carlos 2019 01:34) GOC: Her family was contacted.  Her children were undecided re: code status, and wished to speak with their father prior to making a decision re: DNR/DNI      01/11: Nursing wound care CS, Dietitian cs, F/u ID, NPO as per S/S, will discus GOC with family.     01/12: no feeds over sunday night, OR on monday, will reattempt to place NG tube. c/w medical management, Vanc and cirpo started, F/u vanc trough    IMPRESSION  Failure to Thrive   PNA vs, atelectasis   Decubitus Ulcers   Metabolic encephalopathy  Possibly 2/2 Hypernatremia   Malnourished     Plan   #)Failure to thrive 2/2 Progressive /advanced Dementia  - Awaiting PEG tube tomorrow.    #)AMS 2/2  metabolic encephalopathy  likely due to hypernatremia from  dehydration 2/2 decreased PO intake.  - on NS@75/hour  - GOC discussed with the family : Agreeable with PEG .    #) Infected Decubitus ulcer stage 4 on sacrum  - ID eval noted >>switched abx to cefepime/Metro  -Follow up burn consult.  - wound care management  .    #)HTN  -c/w amlodipine    #)DLD  -crestor changed to lipitor    #)Dementia  c/w memantine    Electrolyte Imbalances: Correct as needed      GI ppx:                                     DVT ppx:  [x] Heparin 5000mg SubQ     BMI:17.4 malnourished   Height (cm): 162.56 (01-11)  Weight (kg): 45.9 (01-11)  BMI (kg/m2): 17.4 (01-11)      DISPO:  Patient to be discharged when condition(s) optimized.  [ x] From Home         CODE STATUS  [X] FULL

## 2019-01-15 NOTE — SWALLOW BEDSIDE ASSESSMENT ADULT - SLP GENERAL OBSERVATIONS
Pt awake, poor mental status. Pt not able to follow simple commands, family at bedside
pt received in bed asleep minimally arousal in no apparent pain.
pt received on ED stretcher asleep minimally arousable in no apparent pain. +oral care provided.

## 2019-01-15 NOTE — CHART NOTE - NSCHARTNOTEFT_GEN_A_CORE
Registered Dietitian Follow-Up     Patient Profile Reviewed                           Yes [x]   No []     Nutrition History Previously Obtained        Yes []  No [x]       Pertinent Subjective Information: Pt NPO as to have PEG placement tomorrow 1/16. Spoke w/ family member- pt NKFA, no vit/min supplementation PTA. Pt UBW unknown, however pt w/ wt loss      Scroll to bottom of page for TF recs****    Pertinent Medical Interventions: Pt admitted w/ poor PO intake. Per LIP, pt FTT, malnourished. W/ weakness likely 2/2 met encephalopathy d/t hypernatremia d/y dehydration. Multiple failed NG placements, PEG to be placed-planned for tomorrow.     Diet order: NPO     Anthropometrics:  - Ht. 64"  - Wt. 45.9kg no updated wts  - %wt change  - BMI 17.4  - IBW 54.5kg     Pertinent Lab Data: (1/15) hgb 11.3, hct 36.3, s gluc 233    glucose likely elevated 2/2 d5w     Pertinent Meds: heparin, cefepime, flagyl, NS 75ml/hr, norvasc, vit C, lipitor, colace, MVI, zinc sulfate     Physical Findings:  - Appearance: pt w/ severe wasting of temple and clavicle muscle and orbital fat, underwt  - GI function: pt w/ fecal incontinence. pt is lethargic, ams/dementia, no documented BM, last BM unknown  - Tubes: to have PEG placed, multiple failed NG placements  - Oral/Mouth cavity: alternate means of nutrition per SLP  - Skin: L and R hip unstageable pressure injuries, R heel suspected DTI, Sacrum DTI, L inner knee stage III pressure injuries     Nutrition Requirements  Weight Used: 45.9kg, admit wt     Estimated Energy Needs    Continue [x]  Adjust []  Adjusted Energy Recommendations:   kcal/day   1090-1365kcal (MSJ x1.2-1.5AF) pt underwt, malnourished per LIP, FTT, multiple pressure injuries       Estimated Protein Needs    Continue [x]  Adjust []  Adjusted Protein Recommendations:   gm/day   57-69g (1.25-1.5g/kg) pt multiple pressure injuries, FTT, underwt     Estimated Fluid Needs        Continue [x]  Adjust []  Adjusted Fluid Recommendations:   mL/day   per LIP    Nutrient Intake: NPO, PEG placement today        [] Previous Nutrition Diagnosis: Inadequate protein-energy intake            [x] Ongoing          [] Resolved  as feeds have not been initiated  [] No active nutrition diagnosis identified at this time     Nutrition Diagnostic #1  Problem: Severe PCM of chronic illness  Etiology: decreased appetite/inability to feed self 2/2 dementia  Statement: pt family reports PO <75% of estimated needs x2-3 years, severe wasting of temple and clavicle muscle and orbital fat    Nutrition Intervention: enteral nutrition  Osmolite 1.0 @240ml x5 feeds + Prosource TF BID, to provide 100% of kcal needs and 104% of protein needs: 1280 kcal, 72g Pro, 1008ml free h2o.   Free h2o flushes per LIP.     Upon initiation: Day 1: 120 ml q6hrs.   If tolerated, Day 2: 240 q6hrs.   If tolerated, Day 3: advance to goal rate  As admitted w/ poor PO, pt at risk for refeeding syndrome, monitor electrolytes (K, Phos, Mg) closely upon advancing feeds.     Goal/Expected Outcome: Pt to tolerate and meet 85% of needs and not to exceed 105% within 3 days    Indicator/Monitoring: RD to monitor energy intake, diet order, NFPF, body comp, glucose and renal profile    Pt at risk, f/u 3 days

## 2019-01-15 NOTE — PROGRESS NOTE ADULT - ASSESSMENT
· Assessment		  IMPRESSION:  infected sacral ulcer  S/p debridement    RECOMMENDATIONS:  vancomycin to 1 gm iv q12h  Cefepime 1 gm iv q12h  Flagyl 500 mg iv q12h

## 2019-01-15 NOTE — CHART NOTE - NSCHARTNOTEFT_GEN_A_CORE
Upon Nutritional Assessment by the Registered Dietitian your patient was determined to meet criteria / has evidence of the following diagnosis/diagnoses:          [ ]  Mild Protein Calorie Malnutrition        [ ]  Moderate Protein Calorie Malnutrition        [x] Severe Protein Calorie Malnutrition        [ ] Unspecified Protein Calorie Malnutrition        [x] Underweight / BMI <19        [ ] Morbid Obesity / BMI > 40    Severe PCM in context of chronic illness related to decreased appetite x2-3 years/ability to feed self 2/2 dementia dx as evidenced by pt w/ poor PO <75% of estimated needs x2-3 years and severe wasting of temple and clavicle muscle + orbital fat    UBW unknown by family    Findings as based on:  •  Comprehensive nutrition assessment and consultation XX  •  Calorie counts (nutrient intake analysis)  •  Food acceptance and intake status from observations by staff  •  Follow up  •  Patient education  •  Intervention secondary to interdisciplinary rounds  •   concerns      Treatment:    The following diet has been recommended:  Osmolite 1.0 @240ml x5 feeds + Prosource TF BID, to provide 100% of kcal needs and 104% of protein needs: 1280 kcal, 72g Pro, 1008ml free h2o.   Free h2o flushes per LIP.     Upon initiation: Day 1: 120 ml q6hrs.   If tolerated, Day 2: 240 q6hrs.   If tolerated, Day 3: advance to goal rate  As admitted w/ poor PO, pt at risk for refeeding syndrome, monitor electrolytes (K, Phos, Mg) closely upon advancing feeds.    PROVIDER Section:     By signing this assessment you are acknowledging and agree with the diagnosis/diagnoses assigned by the Registered Dietitian    Comments:

## 2019-01-15 NOTE — SWALLOW BEDSIDE ASSESSMENT ADULT - SWALLOW EVAL: DIAGNOSIS
SLP attempted trials of puree; pt with no swallow trigger noted despite max cues. Further trials not provided 2' pt at high risk for aspiration

## 2019-01-16 LAB
ANION GAP SERPL CALC-SCNC: 14 MMOL/L — SIGNIFICANT CHANGE UP (ref 7–14)
APTT BLD: 49.2 SEC — HIGH (ref 27–39.2)
BUN SERPL-MCNC: 11 MG/DL — SIGNIFICANT CHANGE UP (ref 10–20)
CALCIUM SERPL-MCNC: 8.1 MG/DL — LOW (ref 8.5–10.1)
CHLORIDE SERPL-SCNC: 102 MMOL/L — SIGNIFICANT CHANGE UP (ref 98–110)
CO2 SERPL-SCNC: 23 MMOL/L — SIGNIFICANT CHANGE UP (ref 17–32)
CREAT SERPL-MCNC: 1.1 MG/DL — SIGNIFICANT CHANGE UP (ref 0.7–1.5)
GLUCOSE BLDC GLUCOMTR-MCNC: 132 MG/DL — HIGH (ref 70–99)
GLUCOSE BLDC GLUCOMTR-MCNC: 190 MG/DL — HIGH (ref 70–99)
GLUCOSE BLDC GLUCOMTR-MCNC: 206 MG/DL — HIGH (ref 70–99)
GLUCOSE SERPL-MCNC: 188 MG/DL — HIGH (ref 70–99)
HCT VFR BLD CALC: 35.2 % — LOW (ref 37–47)
HGB BLD-MCNC: 11.1 G/DL — LOW (ref 12–16)
INR BLD: 1.36 RATIO — HIGH (ref 0.65–1.3)
MAGNESIUM SERPL-MCNC: 2.1 MG/DL — SIGNIFICANT CHANGE UP (ref 1.8–2.4)
MCHC RBC-ENTMCNC: 24.4 PG — LOW (ref 27–31)
MCHC RBC-ENTMCNC: 31.5 G/DL — LOW (ref 32–37)
MCV RBC AUTO: 77.4 FL — LOW (ref 81–99)
NRBC # BLD: 0 /100 WBCS — SIGNIFICANT CHANGE UP (ref 0–0)
PHOSPHATE SERPL-MCNC: 2.2 MG/DL — SIGNIFICANT CHANGE UP (ref 2.1–4.9)
PLATELET # BLD AUTO: 221 K/UL — SIGNIFICANT CHANGE UP (ref 130–400)
POTASSIUM SERPL-MCNC: 3.8 MMOL/L — SIGNIFICANT CHANGE UP (ref 3.5–5)
POTASSIUM SERPL-SCNC: 3.8 MMOL/L — SIGNIFICANT CHANGE UP (ref 3.5–5)
PROTHROM AB SERPL-ACNC: 15.6 SEC — HIGH (ref 9.95–12.87)
RBC # BLD: 4.55 M/UL — SIGNIFICANT CHANGE UP (ref 4.2–5.4)
RBC # FLD: 16 % — HIGH (ref 11.5–14.5)
SODIUM SERPL-SCNC: 139 MMOL/L — SIGNIFICANT CHANGE UP (ref 135–146)
SURGICAL PATHOLOGY STUDY: SIGNIFICANT CHANGE UP
WBC # BLD: 7.7 K/UL — SIGNIFICANT CHANGE UP (ref 4.8–10.8)
WBC # FLD AUTO: 7.7 K/UL — SIGNIFICANT CHANGE UP (ref 4.8–10.8)

## 2019-01-16 RX ORDER — SODIUM CHLORIDE 9 MG/ML
1000 INJECTION, SOLUTION INTRAVENOUS
Qty: 0 | Refills: 0 | Status: DISCONTINUED | OUTPATIENT
Start: 2019-01-16 | End: 2019-01-17

## 2019-01-16 RX ORDER — VANCOMYCIN HCL 1 G
1000 VIAL (EA) INTRAVENOUS EVERY 12 HOURS
Qty: 0 | Refills: 0 | Status: DISCONTINUED | OUTPATIENT
Start: 2019-01-16 | End: 2019-01-18

## 2019-01-16 RX ADMIN — SODIUM CHLORIDE 50 MILLILITER(S): 9 INJECTION, SOLUTION INTRAVENOUS at 16:10

## 2019-01-16 RX ADMIN — Medication 100 MILLIGRAM(S): at 05:15

## 2019-01-16 RX ADMIN — Medication 100 MILLIGRAM(S): at 16:10

## 2019-01-16 RX ADMIN — Medication 100 MILLIGRAM(S): at 22:15

## 2019-01-16 RX ADMIN — AMLODIPINE BESYLATE 10 MILLIGRAM(S): 2.5 TABLET ORAL at 05:16

## 2019-01-16 RX ADMIN — Medication 250 MILLIGRAM(S): at 17:06

## 2019-01-16 RX ADMIN — CHLORHEXIDINE GLUCONATE 1 APPLICATION(S): 213 SOLUTION TOPICAL at 05:15

## 2019-01-16 RX ADMIN — CEFEPIME 100 MILLIGRAM(S): 1 INJECTION, POWDER, FOR SOLUTION INTRAMUSCULAR; INTRAVENOUS at 23:32

## 2019-01-16 RX ADMIN — Medication 1 APPLICATION(S): at 18:19

## 2019-01-16 RX ADMIN — Medication 1 APPLICATION(S): at 05:17

## 2019-01-16 RX ADMIN — Medication 1 APPLICATION(S): at 05:16

## 2019-01-16 RX ADMIN — Medication 1 APPLICATION(S): at 18:20

## 2019-01-16 NOTE — PROGRESS NOTE ADULT - ASSESSMENT
84 yof with pmh of severe dementia, bedbound, nonverbal, htn, dld, pressure ulcers, OA was brought in by family due to decrease po intake and c/o drainage with foul smell and tissue darkening around left decubitus ulcer. No fever, chills, SOB, palpitations, headache, dizziness.   she usually eats soft food with assistance and makes moaning sounds,from home; dependent on all ADLs since 6 years; lives with family at home;     01/10: Underwent debridement by Dr. Gimenez on 12/25.. Has been receiving good wound care with Santyl and VNS following. vitals:137/74, 101, 98.9F, 17, 99% on room air,  Labs no inc WBC, sodium of 149. received cipro,clinda, 1 litre bolus in ED (10 Carlos 2019 01:34) GOC: Her family was contacted.  Her children were undecided re: code status, and wished to speak with their father prior to making a decision re: DNR/DNI      01/11: Nursing wound care CS, Dietitian cs, F/u ID, NPO as per S/S, will discus GOC with family.     01/12: no feeds over sunday night, OR on monday, will reattempt to place NG tube. c/w medical management, Vanc and cirpo started, F/u vanc trough    01/14:  Failed NG tube placement, PEG tube placement for Wednesday, NO PO intake, OR for debridement today, Very lethargic, Spoke with family who want PEg placed, for chronic decline and decreased PO intake.      01/15: OR tomorrow for PEG tube, NG tube placed receiving feeds,   stop feeding at 12am, switching to PO meds.     01/16: PEG placement today, pre Op, NPO, f/u s/p.    IMPRESSION  Failure to Thrive   PNA vs, atelectasis   Decubitus Ulcers   Metabolic encephalopathy  Possibly 2/2 Hypernatremia   Malnourished     Plan   #)Failure to thrive  #)Weakness, Likely due to metabolic encephalopathy likely due to hypernatremia due to dehydration  - will start her on NS@75/hour  - s.p NG placement 01/15  - f/u Dietitian   - PEG today via GI      Plan  #)Decubitus ulcer stage 4 on sacrum with foul smell, no evidence of sepsis  -c/w cipro and clinda,, allergic to penicillin  - Follow up ID recs appreciated DC Cipro started Vanc and  cefepim  - Follow up burn consult - s/p OR  01/14  - wound care management  silvadene cream for barrier protection         #Metabolic encephalopathy  Possibly 2/2 Hypernatremia   - continue d5 75 ml/hr  - f/u at 4pm bmp  - NPO     #)HTN  -c/w amlodipine    #)DLD  -crestor changed to lipitor    #)Dementia  c/w memantine    Electrolyte Imbalances: Correct as needed  [] Hypokalemia   / []  Hyperkalemia   []  Hyponatremia   /   Hypernatremia  []   []  Hypochlorhydria   /    Hypochlorhydria  []   []  Hypomagnesemia   /   Hypermagnesemia  []   []  Hypophosphatemia   /   Hyperphosphatemia  []       GI ppx:                                   [] Not indicated   /   [] Pantoprazole 40mg PO Daily    DVT ppx:  [] Not indicated / [x] Heparin 5000mg SubQ / [] Lovenox 40mg SubQ / [] SCDs    Fluids: NPO  [] PO  |  [X] IVF D5     Activity:  [X] Assisatnce needed   [X] Increase as Tolerated  /  [] OOB w/ assist  /  [] Bed Rest    BMI:17.4 malnourished   Height (cm): 162.56 (01-11)  Weight (kg): 45.9 (01-11)  BMI (kg/m2): 17.4 (01-11)      DISPO:  Patient to be discharged when condition(s) optimized.  [ x] From Home     [ ] NH/SNF   [ ] 4A Rehab  [ ] Detox Clinic  [X] Plan Discussion with patient and/or family. GOC patient made DNR/DNI  [X] Discussed Case and Plan with the Medical Attending.    CODE STATUS  [] FULL   /    [X] DNR/DNI 84 yof with pmh of severe dementia, bedbound, nonverbal, htn, dld, pressure ulcers, OA was brought in by family due to decrease po intake and c/o drainage with foul smell and tissue darkening around left decubitus ulcer. No fever, chills, SOB, palpitations, headache, dizziness.   she usually eats soft food with assistance and makes moaning sounds,from home; dependent on all ADLs since 6 years; lives with family at home;     01/10: Underwent debridement by Dr. Gimenez on 12/25.. Has been receiving good wound care with Santyl and VNS following. vitals:137/74, 101, 98.9F, 17, 99% on room air,  Labs no inc WBC, sodium of 149. received cipro,clinda, 1 litre bolus in ED (10 Carlos 2019 01:34) GOC: Her family was contacted.  Her children were undecided re: code status, and wished to speak with their father prior to making a decision re: DNR/DNI      01/11: Nursing wound care CS, Dietitian cs, F/u ID, NPO as per S/S, will discus GOC with family.     01/12: no feeds over sunday night, OR on monday, will reattempt to place NG tube. c/w medical management, Vanc and cirpo started, F/u vanc trough    01/14:  Failed NG tube placement, PEG tube placement for Wednesday, NO PO intake, OR for debridement today, Very lethargic, Spoke with family who want PEg placed, for chronic decline and decreased PO intake.      01/15: OR tomorrow for PEG tube, NG tube placed receiving feeds,   stop feeding at 12am, switching to PO meds.     01/16: PEG placement today, pre Op, NPO, f/u s/p.    IMPRESSION  Failure to Thrive   PNA vs, atelectasis   Decubitus Ulcers   Metabolic encephalopathy  Possibly 2/2 Hypernatremia   Malnourished     Plan   #)Failure to thrive  #)Weakness, Likely due to metabolic encephalopathy likely due to hypernatremia due to dehydration  - will start her on NS@75/hour  - s.p NG placement 01/15  - f/u Dietitian   - PEG today via GI      Plan  #)Decubitus ulcer stage 4 on sacrum with foul smell, no evidence of sepsis  -c/w cipro and clinda,, allergic to penicillin  - Follow up ID recs appreciated Vanc, Flagyl   and  cefepim  - f/u vanc trough  - Follow up burn consult - s/p OR  01/14  - wound care management  silvadene cream for barrier protection           #Metabolic encephalopathy  Possibly 2/2 Hypernatremia   - continue d5 75 ml/hr  - f/u at 4pm bmp  - NPO     #)HTN  -c/w amlodipine    #)DLD  -crestor changed to lipitor    #)Dementia  c/w memantine    Electrolyte Imbalances: Correct as needed  [] Hypokalemia   / []  Hyperkalemia   []  Hyponatremia   /   Hypernatremia  []   []  Hypochlorhydria   /    Hypochlorhydria  []   []  Hypomagnesemia   /   Hypermagnesemia  []   []  Hypophosphatemia   /   Hyperphosphatemia  []       GI ppx:                                   [] Not indicated   /   [] Pantoprazole 40mg PO Daily    DVT ppx:  [] Not indicated / [x] Heparin 5000mg SubQ / [] Lovenox 40mg SubQ / [] SCDs    Fluids: NPO  [] PO  |  [X] IVF D5     Activity:  [X] Assisatnce needed   [X] Increase as Tolerated  /  [] OOB w/ assist  /  [] Bed Rest    BMI:17.4 malnourished   Height (cm): 162.56 (01-11)  Weight (kg): 45.9 (01-11)  BMI (kg/m2): 17.4 (01-11)      DISPO:  Patient to be discharged when condition(s) optimized.  [ x] From Home     [ ] NH/SNF   [ ] 4A Rehab  [ ] Detox Clinic  [X] Plan Discussion with patient and/or family. GOC patient made DNR/DNI  [X] Discussed Case and Plan with the Medical Attending.    CODE STATUS  [] FULL   /    [X] DNR/DNI 84 yof with pmh of severe dementia, bedbound, nonverbal, htn, dld, pressure ulcers, OA was brought in by family due to decrease po intake and c/o drainage with foul smell and tissue darkening around left decubitus ulcer. No fever, chills, SOB, palpitations, headache, dizziness.   she usually eats soft food with assistance and makes moaning sounds,from home; dependent on all ADLs since 6 years; lives with family at home;     01/10: Underwent debridement by Dr. Gimenez on 12/25.. Has been receiving good wound care with Santyl and VNS following. vitals:137/74, 101, 98.9F, 17, 99% on room air,  Labs no inc WBC, sodium of 149. received cipro,clinda, 1 litre bolus in ED (10 Carlos 2019 01:34) GOC: Her family was contacted.  Her children were undecided re: code status, and wished to speak with their father prior to making a decision re: DNR/DNI      01/11: Nursing wound care CS, Dietitian cs, F/u ID, NPO as per S/S, will discus GOC with family.     01/12: no feeds over sunday night, OR on monday, will reattempt to place NG tube. c/w medical management, Vanc and cirpo started, F/u vanc trough    01/14:  Failed NG tube placement, PEG tube placement for Wednesday, NO PO intake, OR for debridement today, Very lethargic, Spoke with family who want PEg placed, for chronic decline and decreased PO intake.      01/15: OR tomorrow for PEG tube, NG tube placed receiving feeds,   stop feeding at 12am, switching to PO meds.     01/16: PEG placement today, pre Op, NPO, f/u s/p.    IMPRESSION  Failure to Thrive   PNA vs, atelectasis   Decubitus Ulcers   Metabolic encephalopathy  Possibly 2/2 Hypernatremia   Malnourished     Plan   #)Failure to thrive  #)Weakness, Likely due to metabolic encephalopathy likely due to hypernatremia due to dehydration  - will start her on NS@75/hour  - s.p NG placement 01/15  - f/u Dietitian   - PEG today via GI      Plan  #)Decubitus ulcer stage 4 on sacrum with foul smell, no evidence of sepsis  -c/w cipro and clinda,, allergic to penicillin  - Follow up ID recs appreciated Vanc, Flagyl   and  cefepim  - f/u vanc trough  - Follow up burn consult - s/p OR  01/14  - wound care management  silvadene cream for barrier protection           #Metabolic encephalopathy  Possibly 2/2 Hypernatremia   - continue d5 75 ml/hr  - f/u at 4pm bmp  - NPO     #)HTN  -c/w amlodipine    #)DLD  -crestor changed to lipitor    #)Dementia  c/w memantine    Electrolyte Imbalances: Correct as needed  [] Hypokalemia   / []  Hyperkalemia   []  Hyponatremia   /   Hypernatremia  []   []  Hypochlorhydria   /    Hypochlorhydria  []   []  Hypomagnesemia   /   Hypermagnesemia  []   []  Hypophosphatemia   /   Hyperphosphatemia  []       GI ppx:                                   [] Not indicated   /   [] Pantoprazole 40mg PO Daily    DVT ppx:  [] Not indicated / [x] Heparin 5000mg SubQ / [] Lovenox 40mg SubQ / [] SCDs    Fluids: NPO  [] PO  |  [X] IVF D5     Activity:  [X] Assisatnce needed   [X] Increase as Tolerated  /  [] OOB w/ assist  /  [] Bed Rest    BMI:17.4 malnourished   Height (cm): 162.56 (01-11)  Weight (kg): 45.9 (01-11)  BMI (kg/m2): 17.4 (01-11)      DISPO:  Patient to be discharged when condition(s) optimized.  [ x] From Home     [ ] NH/SNF   [ ] 4A Rehab  [ ] Detox Clinic  [X] Plan Discussion with patient and/or family. [facility] patient made DNR/DNI  [X] Discussed Case and Plan with the Medical Attending.    CODE STATUS  [] FULL   /    [X] DNR/DNI  DISPO note   LETTER OF MEDICAL NECESSITY  84 year old female with multiple pressure ulcers, sever dementia  bedbound, h/o debridements. Admitted for  metabolic encephalopathy and decubitus ulcers. It's medical necessary for this patient to receive a hospital bed. This  patient requires positioning of the body in ways not feasible with an ordinary bed in order to alleviate pain. Pillows  and wedges have been considered and ruled out. For prevention of extension of decubiti and prevention of new ulcers.

## 2019-01-16 NOTE — PROGRESS NOTE ADULT - ASSESSMENT
84 yof with pmh of severe dementia, bedbound, nonverbal, htn, dld, pressure ulcers, OA was brought in by family due to decrease po intake and c/o drainage with foul smell and tissue darkening around left decubitus ulcer. No fever, chills, SOB, palpitations, headache, dizziness.   she usually eats soft food with assistance and makes moaning sounds,from home; dependent on all ADLs since 6 years; lives with family at home;     01/10: Underwent debridement by Dr. Gimenez on 12/25.. Has been receiving good wound care with Santyl and VNS following. vitals:137/74, 101, 98.9F, 17, 99% on room air,  Labs no inc WBC, sodium of 149. received cipro,clinda, 1 litre bolus in ED (10 Carlos 2019 01:34) GOC: Her family was contacted.  Her children were undecided re: code status, and wished to speak with their father prior to making a decision re: DNR/DNI      01/11: Nursing wound care CS, Dietitian cs, F/u ID, NPO as per S/S, will discus GOC with family.     01/12: no feeds over sunday night, OR on monday, will reattempt to place NG tube. c/w medical management, Vanc and cirpo started, F/u vanc trough    IMPRESSION  Failure to Thrive   PNA vs, atelectasis   Decubitus Ulcers   Metabolic encephalopathy  Possibly 2/2 Hypernatremia   Malnourished     Plan   #)Failure to thrive 2/2 Progressive /advanced Dementia  s/p PEG, try to use in 24hr with trophic feeding, registered dietician eval for recommendations  f/u lytes 24 hr s/p feedings, alb, phos, mg, cmp    #)AMS 2/2  metabolic encephalopathy  likely due to hypernatremia from  dehydration 2/2 decreased PO intake.  - on NS@75/hour  - GOC discussed with the family : Agreeable with PEG .    #) Infected Decubitus ulcer stage 4 on sacrum  - ID eval noted >>switched abx to cefepime/Metro/ vanco  c/w wound care to sacrum, IT, per burn recs    #)HTN  -c/w amlodipine    #)DLD  -crestor changed to lipitor    #)Dementia  c/w memantine    Electrolyte Imbalances: Correct as needed      GI ppx:                                     DVT ppx:  [x] Heparin 5000mg SubQ     BMI:17.4 malnourished   Height (cm): 162.56 (01-11)  Weight (kg): 45.9 (01-11)  BMI (kg/m2): 17.4 (01-11)      DISPO:  Patient to be discharged when condition(s) optimized.  [ x] From Home         CODE STATUS  [X] FULL     high risk 2/2 advanced dementia, ftt, functional debility

## 2019-01-16 NOTE — PROGRESS NOTE ADULT - ASSESSMENT
IMPRESSION:  infected sacral ulcer  S/p debridement    RECOMMENDATIONS:  No cultures   vancomycin  1 gm iv q12h  Cefepime 1 gm iv q12h  Flagyl 500 mg iv q12h

## 2019-01-16 NOTE — PROGRESS NOTE ADULT - ASSESSMENT
wounds - Stage 4 pressure ulcer bilateral hips   Continue Santyl and packing changes   Continue offloading

## 2019-01-16 NOTE — CHART NOTE - NSCHARTNOTEFT_GEN_A_CORE
PACU ANESTHESIA ADMISSION NOTE      Procedure: Debridement: sharp excisional debridement bilateral hips    Post op diagnosis:  Pressure injury of hip, stage 4, unspecified laterality      ____  Intubated  TV:______       Rate: ______      FiO2: ______    _x___  Patent Airway    _x___  Full return of protective reflexes    _x___  Full recovery from anesthesia / back to baseline     Vitals:   T:           R:18                  BP:115/56                  Sat:97                   P: 83      Mental Status:  __x__ Awake   _____ Alert   _____ Drowsy   _____ Sedated    Nausea/Vomiting:  x____ NO  ______Yes,   See Post - Op Orders          Pain Scale (0-10):  ____0_    Treatment: ____ None    ____ See Post - Op/PCA Orders    Post - Operative Fluids:   ____ Oral   ____ See Post - Op Orders    Plan: Discharge:   ____Home      x _____Floor     _____Critical Care    _____  Other:_________________    Comments:

## 2019-01-17 LAB
ANION GAP SERPL CALC-SCNC: 17 MMOL/L — HIGH (ref 7–14)
BUN SERPL-MCNC: 8 MG/DL — LOW (ref 10–20)
CALCIUM SERPL-MCNC: 8.2 MG/DL — LOW (ref 8.5–10.1)
CHLORIDE SERPL-SCNC: 104 MMOL/L — SIGNIFICANT CHANGE UP (ref 98–110)
CO2 SERPL-SCNC: 22 MMOL/L — SIGNIFICANT CHANGE UP (ref 17–32)
CREAT SERPL-MCNC: 1 MG/DL — SIGNIFICANT CHANGE UP (ref 0.7–1.5)
GLUCOSE BLDC GLUCOMTR-MCNC: 142 MG/DL — HIGH (ref 70–99)
GLUCOSE BLDC GLUCOMTR-MCNC: 154 MG/DL — HIGH (ref 70–99)
GLUCOSE SERPL-MCNC: 131 MG/DL — HIGH (ref 70–99)
HCT VFR BLD CALC: 33.9 % — LOW (ref 37–47)
HGB BLD-MCNC: 10.6 G/DL — LOW (ref 12–16)
MAGNESIUM SERPL-MCNC: 2.4 MG/DL — SIGNIFICANT CHANGE UP (ref 1.8–2.4)
MCHC RBC-ENTMCNC: 24.5 PG — LOW (ref 27–31)
MCHC RBC-ENTMCNC: 31.3 G/DL — LOW (ref 32–37)
MCV RBC AUTO: 78.5 FL — LOW (ref 81–99)
NRBC # BLD: 0 /100 WBCS — SIGNIFICANT CHANGE UP (ref 0–0)
PHOSPHATE SERPL-MCNC: 2.3 MG/DL — SIGNIFICANT CHANGE UP (ref 2.1–4.9)
PLATELET # BLD AUTO: 248 K/UL — SIGNIFICANT CHANGE UP (ref 130–400)
POTASSIUM SERPL-MCNC: 3.5 MMOL/L — SIGNIFICANT CHANGE UP (ref 3.5–5)
POTASSIUM SERPL-SCNC: 3.5 MMOL/L — SIGNIFICANT CHANGE UP (ref 3.5–5)
RBC # BLD: 4.32 M/UL — SIGNIFICANT CHANGE UP (ref 4.2–5.4)
RBC # FLD: 16.2 % — HIGH (ref 11.5–14.5)
SODIUM SERPL-SCNC: 143 MMOL/L — SIGNIFICANT CHANGE UP (ref 135–146)
WBC # BLD: 7.74 K/UL — SIGNIFICANT CHANGE UP (ref 4.8–10.8)
WBC # FLD AUTO: 7.74 K/UL — SIGNIFICANT CHANGE UP (ref 4.8–10.8)

## 2019-01-17 RX ORDER — ZINC SULFATE TAB 220 MG (50 MG ZINC EQUIVALENT) 220 (50 ZN) MG
220 TAB ORAL DAILY
Qty: 0 | Refills: 0 | Status: DISCONTINUED | OUTPATIENT
Start: 2019-01-17 | End: 2019-01-18

## 2019-01-17 RX ORDER — POTASSIUM CHLORIDE 20 MEQ
20 PACKET (EA) ORAL ONCE
Qty: 0 | Refills: 0 | Status: COMPLETED | OUTPATIENT
Start: 2019-01-17 | End: 2019-01-17

## 2019-01-17 RX ORDER — ATORVASTATIN CALCIUM 80 MG/1
80 TABLET, FILM COATED ORAL AT BEDTIME
Qty: 0 | Refills: 0 | Status: DISCONTINUED | OUTPATIENT
Start: 2019-01-17 | End: 2019-01-18

## 2019-01-17 RX ORDER — MEMANTINE HYDROCHLORIDE 10 MG/1
10 TABLET ORAL DAILY
Qty: 0 | Refills: 0 | Status: DISCONTINUED | OUTPATIENT
Start: 2019-01-17 | End: 2019-01-18

## 2019-01-17 RX ORDER — CEFEPIME 1 G/1
1000 INJECTION, POWDER, FOR SOLUTION INTRAMUSCULAR; INTRAVENOUS EVERY 12 HOURS
Qty: 0 | Refills: 0 | Status: DISCONTINUED | OUTPATIENT
Start: 2019-01-17 | End: 2019-01-18

## 2019-01-17 RX ORDER — THIAMINE MONONITRATE (VIT B1) 100 MG
100 TABLET ORAL DAILY
Qty: 0 | Refills: 0 | Status: DISCONTINUED | OUTPATIENT
Start: 2019-01-17 | End: 2019-01-18

## 2019-01-17 RX ORDER — ASPIRIN/CALCIUM CARB/MAGNESIUM 324 MG
81 TABLET ORAL DAILY
Qty: 0 | Refills: 0 | Status: DISCONTINUED | OUTPATIENT
Start: 2019-01-17 | End: 2019-01-18

## 2019-01-17 RX ORDER — AMLODIPINE BESYLATE 2.5 MG/1
10 TABLET ORAL DAILY
Qty: 0 | Refills: 0 | Status: DISCONTINUED | OUTPATIENT
Start: 2019-01-17 | End: 2019-01-18

## 2019-01-17 RX ORDER — DOCUSATE SODIUM 100 MG
100 CAPSULE ORAL DAILY
Qty: 0 | Refills: 0 | Status: DISCONTINUED | OUTPATIENT
Start: 2019-01-17 | End: 2019-01-18

## 2019-01-17 RX ORDER — METRONIDAZOLE 500 MG
500 TABLET ORAL EVERY 12 HOURS
Qty: 0 | Refills: 0 | Status: DISCONTINUED | OUTPATIENT
Start: 2019-01-17 | End: 2019-01-18

## 2019-01-17 RX ORDER — ASPIRIN/CALCIUM CARB/MAGNESIUM 324 MG
81 TABLET ORAL DAILY
Qty: 0 | Refills: 0 | Status: DISCONTINUED | OUTPATIENT
Start: 2019-01-17 | End: 2019-01-17

## 2019-01-17 RX ADMIN — Medication 100 MILLIGRAM(S): at 05:56

## 2019-01-17 RX ADMIN — Medication 250 MILLIGRAM(S): at 05:56

## 2019-01-17 RX ADMIN — ATORVASTATIN CALCIUM 80 MILLIGRAM(S): 80 TABLET, FILM COATED ORAL at 22:52

## 2019-01-17 RX ADMIN — Medication 1 APPLICATION(S): at 05:55

## 2019-01-17 RX ADMIN — AMLODIPINE BESYLATE 10 MILLIGRAM(S): 2.5 TABLET ORAL at 12:08

## 2019-01-17 RX ADMIN — SODIUM CHLORIDE 50 MILLILITER(S): 9 INJECTION, SOLUTION INTRAVENOUS at 05:52

## 2019-01-17 RX ADMIN — CEFEPIME 100 MILLIGRAM(S): 1 INJECTION, POWDER, FOR SOLUTION INTRAMUSCULAR; INTRAVENOUS at 18:13

## 2019-01-17 RX ADMIN — Medication 1 APPLICATION(S): at 18:06

## 2019-01-17 RX ADMIN — Medication 100 MILLIGRAM(S): at 12:07

## 2019-01-17 RX ADMIN — ZINC SULFATE TAB 220 MG (50 MG ZINC EQUIVALENT) 220 MILLIGRAM(S): 220 (50 ZN) TAB at 12:08

## 2019-01-17 RX ADMIN — Medication 20 MILLIEQUIVALENT(S): at 12:10

## 2019-01-17 RX ADMIN — MEMANTINE HYDROCHLORIDE 10 MILLIGRAM(S): 10 TABLET ORAL at 12:07

## 2019-01-17 RX ADMIN — Medication 81 MILLIGRAM(S): at 12:41

## 2019-01-17 RX ADMIN — Medication 100 MILLIGRAM(S): at 18:13

## 2019-01-17 RX ADMIN — Medication 500 MILLIGRAM(S): at 12:08

## 2019-01-17 RX ADMIN — Medication 1 TABLET(S): at 12:08

## 2019-01-17 RX ADMIN — Medication 1 APPLICATION(S): at 05:54

## 2019-01-17 RX ADMIN — CHLORHEXIDINE GLUCONATE 1 APPLICATION(S): 213 SOLUTION TOPICAL at 05:53

## 2019-01-17 RX ADMIN — Medication 100 MILLIGRAM(S): at 12:10

## 2019-01-17 RX ADMIN — Medication 250 MILLIGRAM(S): at 18:05

## 2019-01-17 NOTE — PROGRESS NOTE ADULT - GASTROINTESTINAL
Soft, non-tender, no hepatosplenomegaly, normal bowel sounds
Soft, non-tender, no hepatosplenomegaly, normal bowel sounds
detailed exam
detailed exam

## 2019-01-17 NOTE — PROGRESS NOTE ADULT - ASSESSMENT
IMPRESSION:  infected sacral ulcer  S/p debridement  Cultures E fecalis  BCx NTD    RECOMMENDATIONS:  Unasyn3 gm iv q6h  D/c other ABx IMPRESSION:  infected sacral ulcer  S/p debridement  Cultures E fecalis  BCx NTD    RECOMMENDATIONS:  No change in ABx

## 2019-01-17 NOTE — PROGRESS NOTE ADULT - ASSESSMENT
84 yof with pmh of severe dementia, bedbound, nonverbal, htn, dld, pressure ulcers, OA was brought in by family due to decrease po intake and c/o drainage with foul smell and tissue darkening around left decubitus ulcer. No fever, chills, SOB, palpitations, headache, dizziness.   she usually eats soft food with assistance and makes moaning sounds,from home; dependent on all ADLs since 4 years; lives with family at home;     01/10: Underwent debridement by Dr. Gimenez on 12/25.. Has been receiving good wound care with Santyl and VNS following. vitals:137/74, 101, 98.9F, 17, 99% on room air,  Labs no inc WBC, sodium of 149. received cipro,clinda, 1 litre bolus in ED (10 Carlos 2019       01/11: Nursing wound care CS, Dietitian cs, F/u ID, NPO as per S/S, will discus GOC with family.     01/12: no feeds over sunday night, OR on monday, will reattempt to place NG tube. c/w medical management, Vanc and cirpo started, F/u vanc trough    IMPRESSION  Failure to Thrive   PNA vs, atelectasis   Decubitus Ulcers   Metabolic encephalopathy  Possibly 2/2 Hypernatremia   Malnourished     Plan   #)Failure to thrive 2/2 Progressive /advanced Dementia  advance feedings per dietary recommendation  f/u lytes tomorrow, mg, phos  aspiration/ HOB precautions  zinc/ vitamin C    #)AMS 2/2  advanced dementia compounded by hypernatremia from  dehydration 2/2 decreased PO intake; doubt metabolic encephalopathy  patients mentation has been relatively unchanged over the last several days  likely this is advanced dementia rather than acute metabolic encephalopathy    #) Infected Decubitus ulcer stage 4 on sacrum  - ID eval noted >>switched abx to cefepime/Metro/ vanco  c/w wound care to sacrum, IT, per burn recs    #)HTN  -c/w amlodipine    #)DLD  -crestor changed to lipitor    #)Dementia  c/w memantine    Electrolyte Imbalances: Correct as needed      GI ppx:                                     DVT ppx:  [x] Heparin 5000mg SubQ     BMI:17.4 malnourished   Height (cm): 162.56 (01-11)  Weight (kg): 45.9 (01-11)  BMI (kg/m2): 17.4 (01-11)      DISPO:  Patient to be discharged when condition(s) optimized.  [ x] From Home         high risk 2/2 advanced dementia, ftt, functional debility 84 yof with pmh of severe dementia, bedbound, nonverbal, htn, dld, pressure ulcers, OA was brought in by family due to decrease po intake and c/o drainage with foul smell and tissue darkening around left decubitus ulcer. No fever, chills, SOB, palpitations, headache, dizziness.   she usually eats soft food with assistance and makes moaning sounds,from home; dependent on all ADLs since 4 years; lives with family at home;     01/10: Underwent debridement by Dr. Gimenez on 12/25.. Has been receiving good wound care with Santyl and VNS following. vitals:137/74, 101, 98.9F, 17, 99% on room air,  Labs no inc WBC, sodium of 149. received cipro,clinda, 1 litre bolus in ED (10 Carlos 2019       01/11: Nursing wound care CS, Dietitian cs, F/u ID, NPO as per S/S, will discus GOC with family.     01/12: no feeds over sunday night, OR on monday, will reattempt to place NG tube. c/w medical management, Vanc and cirpo started, F/u vanc trough    IMPRESSION  Failure to Thrive   PNA vs, atelectasis   Decubitus Ulcers   Metabolic encephalopathy  Possibly 2/2 Hypernatremia   Malnourished     Plan   #)Failure to thrive 2/2 Progressive /advanced Dementia  advance feedings per dietary recommendation  f/u lytes tomorrow, mg, phos  aspiration/ HOB precautions  zinc/ vitamin C    #)AMS 2/2  advanced dementia compounded by hypernatremia from  dehydration 2/2 decreased PO intake; doubt metabolic encephalopathy  patients mentation has been relatively unchanged over the last several days  likely this is advanced dementia rather than acute metabolic encephalopathy    #) Infected Decubitus ulcer stage 4 on sacrum  - ID eval noted >>switched abx to cefepime/Metro/ vanco  c/w wound care to sacrum, IT, per burn recs    #functional quadriplegia  contracted  offloading, nutritional optimization, wound care    #)HTN  -c/w amlodipine    #)DLD  -crestor changed to lipitor    #)Dementia  c/w memantine    Electrolyte Imbalances: Correct as needed      GI ppx:                                     DVT ppx:  [x] Heparin 5000mg SubQ     BMI:17.4 malnourished   Height (cm): 162.56 (01-11)  Weight (kg): 45.9 (01-11)  BMI (kg/m2): 17.4 (01-11)      DISPO:  Patient to be discharged when condition(s) optimized.  [ x] From Home         high risk 2/2 advanced dementia, ftt, functional debility

## 2019-01-17 NOTE — PROGRESS NOTE ADULT - ASSESSMENT
HOSPITAL [facility]RSE [facility]MERY  84 yof with pmh of severe dementia, bedbound, nonverbal, htn, dld, pressure ulcers, OA was brought in by family due to decrease po intake and c/o drainage with foul smell and tissue darkening around left decubitus ulcer. No fever, chills, SOB, palpitations, headache, dizziness.   she usually eats soft food with assistance and makes moaning sounds,from home; dependent on all ADLs since 6 years; lives with family at home;     01/10: Underwent debridement by Dr. Gimenez on 12/25.. Has been receiving good wound care with Santyl and VNS following. vitals:137/74, 101, 98.9F, 17, 99% on room air,  Labs no inc WBC, sodium of 149. received cipro,clinda, 1 litre bolus in ED (10 Carlos 2019 01:34) GOC: Her family was contacted.  Her children were undecided re: code status, and wished to speak with their father prior to making a decision re: DNR/DNI      01/11: Nursing wound care CS, Dietitian cs, F/u ID, NPO as per S/S, will discus GOC with family.     01/12: no feeds over sunday night, OR on monday, will reattempt to place NG tube. c/w medical management, Vanc and cirpo started, F/u vanc trough    01/14:  Failed NG tube placement, PEG tube placement for Wednesday, NO PO intake, OR for debridement today, Very lethargic, Spoke with family who want PEg placed, for chronic decline and decreased PO intake.      01/15: OR tomorrow for PEG tube, NG tube placed receiving feeds,   stop feeding at 12am, switching to PO meds.     01/16: PEG placement today, pre Op, NPO, f/u s/p.    01/17: Starting peg feeds, monitoring for refeeding syndrome, DC planing once Home hospital bed approved. Dietitian for peg feedings     IMPRESSION  Failure to Thrive   PNA vs, atelectasis   Decubitus Ulcers   Metabolic encephalopathy  Possibly 2/2 Hypernatremia   Malnourished     Plan   #)Failure to thrive  #)Weakness, Likely due to metabolic encephalopathy vs, chronic worsening sever dementia   - s.p NG placement 01/15  - s/p PEG tube placement  01/16 successful-> ok to use   - f/u Dietitian PEG feeds     #)Decubitus ulcer stage 4 on sacrum with foul smell, no evidence of sepsis  -c/w cipro and clinda,, allergic to penicillin  - Follow up ID recs appreciated Vanc, Flagyl   and  cefepim  - vanc trough  - Follow up burn consult - s/p OR  01/14--->>> Off loading   - wound care management  Continue Santyl and packing changes     #monitor for refeeding syndrome   - f/u at  bmp [facility] phos      #)HTN  -c/w amlodipine    #)DLD  -crestor changed to lipitor    #)Dementia  c/w memantine    Electrolyte Imbalances: Correct as needed  [] Hypokalemia   / []  Hyperkalemia   []  Hyponatremia   /   Hypernatremia  []   []  Hypochlorhydria   /    Hypochlorhydria  []   []  Hypomagnesemia   /   Hypermagnesemia  []   []  Hypophosphatemia   /   Hyperphosphatemia  []       GI ppx:                                   [] Not indicated   /   [] Pantoprazole 40mg PO Daily    DVT ppx:  [] Not indicated / [x] Heparin 5000mg SubQ / [] Lovenox 40mg SubQ / [] SCDs    Fluids: NPO with PEG feeds   [] PO  |  [] IVF D5     Activity: Contracted bed bound  [X] Assisatnce needed   [] Increase as Tolerated  /  [] OOB w/ assist  /  [] Bed Rest    BMI:17.4 malnourished   Height (cm): 162.56 (01-11)  Weight (kg): 45.9 (01-11)  BMI (kg/m2): 17.4 (01-11)      DISPO:  Patient to be discharged when condition(s) optimized.  [ x] From Home     [ ] NH/SNF   [ ] 4A Rehab  [ ] Detox Clinic  [X] Plan Discussion with patient and/or family. GOC patient made DNR/DNI  [X] Discussed Case and Plan with the Medical Attending.    CODE STATUS  [] FULL   /    [X] DNR/DNI  DISPO note   LETTER OF MEDICAL NECESSITY  84 year old female with multiple pressure ulcers, sever dementia  bedbound, h/o debridements. Admitted for  metabolic encephalopathy and decubitus ulcers. It's medical necessary for this patient to receive a hospital bed. This  patient requires positioning of the body in ways not feasible with an ordinary bed in order to alleviate pain. Pillows  and wedges have been considered and ruled out. For prevention of extension of decubiti and prevention of new ulcers.     Patient medically needs a low air loss mattress for b/l Stage IV decubitus ulcers on hips. This mattress will help in the healing process along with debridement  wound care. It will also help prevent further progression. HOSPITAL [facility]ERY  84 yof with pmh of severe dementia, bedbound, nonverbal, htn, dld, pressure ulcers, OA was brought in by family due to decrease po intake and c/o drainage with foul smell and tissue darkening around left decubitus ulcer. No fever, chills, SOB, palpitations, headache, dizziness.   she usually eats soft food with assistance and makes moaning sounds,from home; dependent on all ADLs since 6 years; lives with family at home;     01/10: Underwent debridement by Dr. Gimenez on 12/25.. Has been receiving good wound care with Santyl and VNS following. vitals:137/74, 101, 98.9F, 17, 99% on room air,  Labs no inc WBC, sodium of 149. received cipro,clinda, 1 litre bolus in ED (10 Carlos 2019 01:34) GOC: Her family was contacted.  Her children were undecided re: code status, and wished to speak with their father prior to making a decision re: DNR/DNI      01/11: Nursing wound care CS, Dietitian cs, F/u ID, NPO as per S/S, will discus GOC with family.     01/12: no feeds over sunday night, OR on monday, will reattempt to place NG tube. c/w medical management, Vanc and cirpo started, F/u vanc trough    01/14:  Failed NG tube placement, PEG tube placement for Wednesday, NO PO intake, OR for debridement today, Very lethargic, Spoke with family who want PEg placed, for chronic decline and decreased PO intake.      01/15: OR tomorrow for PEG tube, NG tube placed receiving feeds,   stop feeding at 12am, switching to PO meds.     01/16: PEG placement today, pre Op, NPO, f/u s/p.    01/17: Starting peg feeds, monitoring for refeeding syndrome, DC planing once Home hospital bed approved. Dietitian for peg feedings     IMPRESSION  Failure to Thrive - s/p PEG, will require Life long PEG feeds.  PNA vs, atelectasis   Decubitus Ulcers   Metabolic encephalopathy  Possibly 2/2 Hypernatremia   Malnourished     Plan   #)Failure to thrive  #)Weakness, Likely due to metabolic encephalopathy vs, chronic worsening sever dementia   - s.p NG placement 01/15  - s/p PEG tube placement  01/16 successful-> ok to use   - f/u Dietitian PEG feeds     #)Decubitus ulcer stage 4 on sacrum with foul smell, no evidence of sepsis  -c/w cipro and clinda,, allergic to penicillin  - Follow up ID recs appreciated Vanc, Flagyl   and  cefepim  - vanc trough  - Follow up burn consult - s/p OR  01/14--->>> Off loading   - wound care management  Continue Santyl and packing changes     #monitor for refeeding syndrome   - f/u at  [facility]      #)HTN  -c/w amlodipine    #)DLD  -crestor changed to lipitor    #)Dementia  c/w memantine    Electrolyte Imbalances: Correct as needed  [] Hypokalemia   / []  Hyperkalemia   []  Hyponatremia   /   Hypernatremia  []   []  Hypochlorhydria   /    Hypochlorhydria  []   []  Hypomagnesemia   /   Hypermagnesemia  []   []  Hypophosphatemia   /   Hyperphosphatemia  []       GI ppx:                                   [] Not indicated   /   [] Pantoprazole 40mg PO Daily    DVT ppx:  [] Not indicated / [x] Heparin 5000mg SubQ / [] Lovenox 40mg SubQ / [] SCDs    Fluids: NPO with PEG feeds   [] PO  |  [] IVF D5     Activity: Contracted bed bound  [X] Assisatnce needed   [] Increase as Tolerated  /  [] OOB w/ assist  /  [] Bed Rest    BMI:17.4 malnourished   Height (cm): 162.56 (01-11)  Weight (kg): 45.9 (01-11)  BMI (kg/m2): 17.4 (01-11)      DISPO:  Patient to be discharged when condition(s) optimized.  [ x] From Home     [ ] NH/SNF   [ ] 4A Rehab  [ ] Detox Clinic  [X] Plan Discussion with patient and/or family. GOC patient made DNR/DNI  [X] Discussed Case and Plan with the Medical Attending.    CODE STATUS  [] FULL   /    [X] DNR/DNI  DISPO note   LETTER OF MEDICAL NECESSITY  84 year old female with multiple pressure ulcers, sever dementia  bedbound, h/o debridements. Admitted for  metabolic encephalopathy and decubitus ulcers. It's medical necessary for this patient to receive a hospital bed. This  patient requires positioning of the body in ways not feasible with an ordinary bed in order to alleviate pain. Pillows  and wedges have been considered and ruled out. For prevention of extension of decubiti and prevention of new ulcers.     Patient medically needs a low air loss mattress for b/l Stage IV decubitus ulcers on hips. This mattress will help in the healing process along with debridement  wound care. It will also help prevent further progression. [facility]OSP[facility]  84 yof with pmh of severe dementia, bedbound, nonverbal, htn, dld, pressure ulcers, OA was brought in by family due to decrease po intake and c/o drainage with foul smell and tissue darkening around left decubitus ulcer. No fever, chills, SOB, palpitations, headache, dizziness.   she usually eats soft food with assistance and makes moaning sounds,from home; dependent on all ADLs since 6 years; lives with family at home;     01/10: Underwent debridement by Dr. Gimenez on 12/25.. Has been receiving good wound care with Santyl and VNS following. vitals:137/74, 101, 98.9F, 17, 99% on room air,  Labs no inc WBC, sodium of 149. received cipro,clinda, 1 litre bolus in ED (10 Carlos 2019 01:34) GOC: Her family was contacted.  Her children were undecided re: code status, and wished to speak with their father prior to making a decision re: DNR/DNI      01/11: Nursing wound care CS, Dietitian cs, F/u ID, NPO as per S/S, will discus GOC with family.     01/12: no feeds over sunday night, OR on monday, will reattempt to place NG tube. c/w medical management, Vanc and cirpo started, F/u vanc trough    01/14:  Failed NG tube placement, PEG tube placement for Wednesday, NO PO intake, OR for debridement today, Very lethargic, Spoke with family who want PEg placed, for chronic decline and decreased PO intake.      01/15: OR tomorrow for PEG tube, NG tube placed receiving feeds,   stop feeding at 12am, switching to PO meds.     01/16: PEG placement today, pre Op, NPO, f/u s/p.    01/17: Starting peg feeds, monitoring for refeeding syndrome, DC planing once Home hospital bed approved. Dietitian for peg feedings     IMPRESSION  Failure to Thrive - s/p PEG, will require Life long PEG feeds.  PNA vs, atelectasis   Decubitus Ulcers   Metabolic encephalopathy  Possibly 2/2 Hypernatremia   Malnourished     Plan   #)Failure to thrive  #)Weakness, Likely due to metabolic encephalopathy vs, chronic worsening sever dementia   - s.p NG placement 01/15  - s/p PEG tube placement  01/16 successful-> ok to use   - will need life long PEG feeds   Osmolite 1.0 @240ml x5 feeds + Prosource TF BID, to provide 100% of kcal needs and 104% of protein needs: 1280 kcal, 72g Pro, 1008ml free h2o.   Free h2o flushes per LIP.       #)Decubitus ulcer stage 4 on sacrum with foul smell, no evidence of sepsis  -c/w cipro and clinda,, allergic to penicillin  - Follow up ID recs appreciated Vanc, Flagyl   and  cefepim  - vanc trough  - Follow up burn consult - s/p OR  01/14--->>> Off loading   - wound care management  Continue Santyl and packing changes     #monitor for refeeding syndrome   - f/u at  [facility]      #)HTN  -c/w amlodipine    #)DLD  -crestor changed to lipitor    #)Dementia  c/w memantine    Electrolyte Imbalances: Correct as needed  [] Hypokalemia   / []  Hyperkalemia   []  Hyponatremia   /   Hypernatremia  []   []  Hypochlorhydria   /    Hypochlorhydria  []   []  Hypomagnesemia   /   Hypermagnesemia  []   []  Hypophosphatemia   /   Hyperphosphatemia  []       GI ppx:                                   [] Not indicated   /   [] Pantoprazole 40mg PO Daily    DVT ppx:  [] Not indicated / [x] Heparin 5000mg SubQ / [] Lovenox 40mg SubQ / [] SCDs    Fluids: NPO with PEG feeds   [] PO  |  [] IVF D5     Activity: Contracted bed bound  [X] Assisatnce needed   [] Increase as Tolerated  /  [] OOB w/ assist  /  [] Bed Rest    BMI:17.4 malnourished   Height (cm): 162.56 (01-11)  Weight (kg): 45.9 (01-11)  BMI (kg/m2): 17.4 (01-11)      DISPO:  Patient to be discharged when condition(s) optimized.  [ x] From Home     [ ] NH/SNF   [ ] 4A Rehab  [ ] Detox Clinic  [X] Plan Discussion with patient and/or family. GOC patient made DNR/DNI  [X] Discussed Case and Plan with the Medical Attending.    CODE STATUS  [] FULL   /    [X] DNR/DNI  DISPO note   LETTER OF MEDICAL NECESSITY  84 year old female with multiple pressure ulcers, sever dementia  bedbound, h/o debridements. Admitted for  metabolic encephalopathy and decubitus ulcers. It's medical necessary for this patient to receive a hospital bed. This  patient requires positioning of the body in ways not feasible with an ordinary bed in order to alleviate pain. Pillows  and wedges have been considered and ruled out. For prevention of extension of decubiti and prevention of new ulcers.     Patient medically needs a low air loss mattress for b/l Stage IV decubitus ulcers on hips. This mattress will help in the healing process along with debridement  wound care. It will also help prevent further progression. HOSPITAL [facility]SE [facility]  84 yof with pmh of severe dementia, bedbound, nonverbal, htn, dld, pressure ulcers, OA was brought in by family due to decrease po intake and c/o drainage with foul smell and tissue darkening around left decubitus ulcer. No fever, chills, SOB, palpitations, headache, dizziness.   she usually eats soft food with assistance and makes moaning sounds,from home; dependent on all ADLs since 6 years; lives with family at home;     01/10: Underwent debridement by Dr. Gimenez on 12/25.. Has been receiving good wound care with Santyl and VNS following. vitals:137/74, 101, 98.9F, 17, 99% on room air,  Labs no inc WBC, sodium of 149. received cipro,clinda, 1 litre bolus in ED (10 Carlos 2019 01:34) GOC: Her family was contacted.  Her children were undecided re: code status, and wished to speak with their father prior to making a decision re: DNR/DNI      01/11: Nursing wound care CS, Dietitian cs, F/u ID, NPO as per S/S, will discus GOC with family.     01/12: no feeds over sunday night, OR on monday, will reattempt to place NG tube. c/w medical management, Vanc and cirpo started, F/u vanc trough    01/14:  Failed NG tube placement, PEG tube placement for Wednesday, NO PO intake, OR for debridement today, Very lethargic, Spoke with family who want PEg placed, for chronic decline and decreased PO intake.      01/15: OR tomorrow for PEG tube, NG tube placed receiving feeds,   stop feeding at 12am, switching to PO meds.     01/16: PEG placement today, pre Op, NPO, f/u s/p.    01/17: Starting peg feeds, monitoring for refeeding syndrome, DC planing once Home hospital bed approved. Dietitian for peg feedings     IMPRESSION  Failure to Thrive - s/p PEG, will require Life long PEG feeds.  PNA vs, atelectasis   Decubitus Ulcers   Metabolic encephalopathy  Possibly 2/2 Hypernatremia   Malnourished     Plan   #)Failure to thrive  #)Weakness, Likely due to metabolic encephalopathy vs, chronic worsening sever dementia   - s.p NG placement 01/15  - s/p PEG tube placement  01/16 successful-> ok to use   - will need life long PEG feeds   Osmolite 1.0 @240ml x5 feeds + Prosource TF BID, to provide 100% of kcal needs and 104% of protein needs: 1280 kcal, 72g Pro, 1008ml free h2o.   Free h2o flushes per LIP.       #)Decubitus ulcer stage 4 on sacrum with foul smell, no evidence of sepsis  -c/w cipro and clinda,, allergic to penicillin  - Follow up ID recs appreciated Vanc, Flagyl   and  cefepim  - vanc trough  - Follow up burn consult - s/p OR  01/14--->>> Off loading   - wound care management  Continue Santyl and packing changes     #monitor for refeeding syndrome   - f/u at  bmp mg phos      #)HTN  -c/w amlodipine    #)DLD  -crestor changed to lipitor    #)Dementia  c/w memantine    Electrolyte Imbalances: Correct as needed  [] Hypokalemia   / []  Hyperkalemia   []  Hyponatremia   /   Hypernatremia  []   []  Hypochlorhydria   /    Hypochlorhydria  []   []  Hypomagnesemia   /   Hypermagnesemia  []   []  Hypophosphatemia   /   Hyperphosphatemia  []       GI ppx:                                   [] Not indicated   /   [] Pantoprazole 40mg PO Daily    DVT ppx:  [] Not indicated / [x] Heparin 5000mg SubQ / [] Lovenox 40mg SubQ / [] SCDs    Fluids: NPO with PEG feeds   [] PO  |  [] IVF D5     Activity: Contracted bed bound  [X] Assisatnce needed   [] Increase as Tolerated  /  [] OOB w/ assist  /  [] Bed Rest    BMI:17.4 malnourished   Height (cm): 162.56 (01-11)  Weight (kg): 45.9 (01-11)  BMI (kg/m2): 17.4 (01-11)      DISPO:  Patient to be discharged when condition(s) optimized.  [ x] From Home     [ ] NH/SNF   [ ] 4A Rehab  [ ] Detox Clinic  [X] Plan Discussion with patient and/or family. GOC patient made DNR/DNI  [X] Discussed Case and Plan with the Medical Attending.    CODE STATUS  [] FULL   /    [X] DNR/DNI  DISPO note   84 year old female with multiple pressure ulcers, sever dementia  bedbound, h/o debridements. Admitted for  metabolic encephalopathy and decubitus ulcers. It's medical necessary for this patient to receive a hospital bed.  The head of the bed needs to elevated at 45 degree angle because patient is at high risk of aspiration during bolus feeds. This  patient requires positioning of the body in ways not feasible with an ordinary bed in order to alleviate pain. Pillows  and wedges have been considered and ruled out. For prevention of extension of decubiti and prevention of new ulcers.     Patient medically needs a low air loss mattress for b/l Stage IV decubitus ulcers on hips. This mattress will help in the healing process along with debridement  wound care. It will also help prevent further progression. HOSPITAL COURSE SUMMERY  84 yof with pmh of severe dementia, bedbound, nonverbal, htn, dld, pressure ulcers, OA was brought in by family due to decrease po intake and c/o drainage with foul smell and tissue darkening around left decubitus ulcer. No fever, chills, SOB, palpitations, headache, dizziness.   she usually eats soft food with assistance and makes moaning sounds,from home; dependent on all ADLs since 4 years; lives with family at home;     01/10: Underwent debridement by Dr. Gimenez on 12/25.. Has been receiving good wound care with Santyl and VNS following. vitals:137/74, 101, 98.9F, 17, 99% on room air,  Labs no inc WBC, sodium of 149. received cipro,clinda, 1 litre bolus in ED (10 Carlos 2019 01:34) GOC: Her family was contacted.  Her children were undecided re: code status, and wished to speak with their father prior to making a decision re: DNR/DNI      01/11: Nursing wound care CS, Dietitian cs, F/u ID, NPO as per S/S, will discus GOC with family.     01/12: no feeds over sunday night, OR on monday, will reattempt to place NG tube. c/w medical management, Vanc and cirpo started, F/u vanc trough    01/14:  Failed NG tube placement, PEG tube placement for Wednesday, NO PO intake, OR for debridement today, Very lethargic, Spoke with family who want PEg placed, for chronic decline and decreased PO intake.      01/15: OR tomorrow for PEG tube, NG tube placed receiving feeds,   stop feeding at 12am, switching to PO meds.     01/16: PEG placement today, pre Op, NPO, f/u s/p.    01/17: Starting peg feeds, monitoring for refeeding syndrome, DC planing once Home hospital bed approved. Dietitian for peg feedings     IMPRESSION  Failure to Thrive - s/p PEG, will require Life long PEG feeds.  PNA vs, atelectasis   Decubitus Ulcers   Metabolic encephalopathy  Possibly 2/2 Hypernatremia   Malnourished   Functional Quadriplegia  Thiamin Deficiency      Plan   #)Failure to thrive  #)Weakness, Likely due to metabolic encephalopathy vs, chronic worsening sever dementia   - s.p NG placement 01/15  - s/p PEG tube placement  01/16 successful-> ok to use   - will need life long PEG feeds   Osmolite 1.0 @240ml x5 feeds + Prosource TF BID, to provide 100% of kcal needs and 104% of protein needs: 1280 kcal, 72g Pro, 1008ml free h2o.   Free h2o flushes per LIP.       #)Decubitus ulcer stage 4 on sacrum with foul smell, no evidence of sepsis  -c/w cipro and clinda,, allergic to penicillin  - Follow up ID recs appreciated Vanc, Flagyl   and  cefepim  - vanc trough  - Follow up burn consult - s/p OR  01/14--->>> Off loading   - wound care management  Continue Santyl and packing changes     #monitor for refeeding syndrome   - f/u at  bmp mg phos      #)HTN  -c/w amlodipine    #)DLD  -crestor changed to lipitor    #)Dementia  c/w memantine    Electrolyte Imbalances: Correct as needed  [] Hypokalemia   / []  Hyperkalemia   []  Hyponatremia   /   Hypernatremia  []   []  Hypochlorhydria   /    Hypochlorhydria  []   []  Hypomagnesemia   /   Hypermagnesemia  []   []  Hypophosphatemia   /   Hyperphosphatemia  []       GI ppx:                                   [] Not indicated   /   [] Pantoprazole 40mg PO Daily    DVT ppx:  [] Not indicated / [x] Heparin 5000mg SubQ / [] Lovenox 40mg SubQ / [] SCDs    Fluids: NPO with PEG feeds   [] PO  |  [] IVF D5     Activity: Contracted bed bound  [X] Assisatnce needed   [] Increase as Tolerated  /  [] OOB w/ assist  /  [] Bed Rest    BMI:17.4 malnourished   Height (cm): 162.56 (01-11)  Weight (kg): 45.9 (01-11)  BMI (kg/m2): 17.4 (01-11)      DISPO:  Patient to be discharged when condition(s) optimized.  [ x] From Home     [ ] NH/SNF   [ ] 4A Rehab  [ ] Detox Clinic  [X] Plan Discussion with patient and/or family. GOC patient made DNR/DNI  [X] Discussed Case and Plan with the Medical Attending.    CODE STATUS  [] FULL   /    [X] DNR/DNI  DISPO note   84 year old female with multiple pressure ulcers, sever dementia  bedbound, h/o debridements. Admitted for  metabolic encephalopathy and decubitus ulcers. It's medical necessary for this patient to receive a hospital bed.  The head of the bed needs to elevated at 45 degree angle because patient is at high risk of aspiration during bolus feeds. This  patient requires positioning of the body in ways not feasible with an ordinary bed in order to alleviate pain. Pillows  and wedges have been considered and ruled out. For prevention of extension of decubiti and prevention of new ulcers.     Patient medically needs a low air loss mattress for b/l Stage IV decubitus ulcers on hips. This mattress will help in the healing process along with debridement  wound care. It will also help prevent further progression.

## 2019-01-18 VITALS
TEMPERATURE: 98 F | DIASTOLIC BLOOD PRESSURE: 56 MMHG | HEART RATE: 94 BPM | RESPIRATION RATE: 16 BRPM | SYSTOLIC BLOOD PRESSURE: 120 MMHG

## 2019-01-18 RX ORDER — METRONIDAZOLE 500 MG
500 TABLET ORAL EVERY 8 HOURS
Qty: 0 | Refills: 0 | Status: DISCONTINUED | OUTPATIENT
Start: 2019-01-18 | End: 2019-01-18

## 2019-01-18 RX ORDER — LINEZOLID 600 MG/300ML
600 INJECTION, SOLUTION INTRAVENOUS EVERY 12 HOURS
Qty: 0 | Refills: 0 | Status: DISCONTINUED | OUTPATIENT
Start: 2019-01-18 | End: 2019-01-18

## 2019-01-18 RX ORDER — LINEZOLID 600 MG/300ML
1 INJECTION, SOLUTION INTRAVENOUS
Qty: 20 | Refills: 0 | OUTPATIENT
Start: 2019-01-18 | End: 2019-01-27

## 2019-01-18 RX ORDER — METRONIDAZOLE 500 MG
1 TABLET ORAL
Qty: 27 | Refills: 0 | OUTPATIENT
Start: 2019-01-18 | End: 2019-01-26

## 2019-01-18 RX ORDER — ASCORBIC ACID 60 MG
1 TABLET,CHEWABLE ORAL
Qty: 30 | Refills: 0 | OUTPATIENT
Start: 2019-01-18 | End: 2019-02-16

## 2019-01-18 RX ADMIN — ZINC SULFATE TAB 220 MG (50 MG ZINC EQUIVALENT) 220 MILLIGRAM(S): 220 (50 ZN) TAB at 12:09

## 2019-01-18 RX ADMIN — Medication 100 MILLIGRAM(S): at 12:09

## 2019-01-18 RX ADMIN — Medication 500 MILLIGRAM(S): at 12:09

## 2019-01-18 RX ADMIN — Medication 500 MILLIGRAM(S): at 12:13

## 2019-01-18 RX ADMIN — Medication 100 MILLIGRAM(S): at 05:04

## 2019-01-18 RX ADMIN — Medication 1 APPLICATION(S): at 17:06

## 2019-01-18 RX ADMIN — LINEZOLID 600 MILLIGRAM(S): 600 INJECTION, SOLUTION INTRAVENOUS at 12:13

## 2019-01-18 RX ADMIN — Medication 81 MILLIGRAM(S): at 12:09

## 2019-01-18 RX ADMIN — Medication 1 APPLICATION(S): at 05:25

## 2019-01-18 RX ADMIN — CEFEPIME 100 MILLIGRAM(S): 1 INJECTION, POWDER, FOR SOLUTION INTRAMUSCULAR; INTRAVENOUS at 05:04

## 2019-01-18 RX ADMIN — MEMANTINE HYDROCHLORIDE 10 MILLIGRAM(S): 10 TABLET ORAL at 12:09

## 2019-01-18 RX ADMIN — CHLORHEXIDINE GLUCONATE 1 APPLICATION(S): 213 SOLUTION TOPICAL at 05:23

## 2019-01-18 RX ADMIN — LINEZOLID 600 MILLIGRAM(S): 600 INJECTION, SOLUTION INTRAVENOUS at 17:06

## 2019-01-18 RX ADMIN — Medication 250 MILLIGRAM(S): at 05:23

## 2019-01-18 RX ADMIN — Medication 1 TABLET(S): at 12:09

## 2019-01-18 NOTE — PROGRESS NOTE ADULT - REASON FOR ADMISSION
Dec PO intake

## 2019-01-18 NOTE — PROGRESS NOTE ADULT - ASSESSMENT
IMPRESSION:  infected sacral ulcer  S/p debridement  Cultures E fecalis  BCx NTD    RECOMMENDATIONS  D/c iv ABx  Po Zyvox 600 mg q12h  Po Levoquin 500 mg q24h  Po Flagyl 500 mg q8h  Duration 10 more days  Recall prn please

## 2019-01-18 NOTE — PROGRESS NOTE ADULT - ASSESSMENT
84 yof with pmh of severe advanced dementia, bedbound/ functional quadriplegia, nonverbal, htn, dld, pressure ulcers here with FTT/ severe protein calorie malnutrition, infected trochanter/ sacral/ ulcers s/p debridement, s/p G tube tolerating feeds      G tube feeds  HOB/ aspiration precautions  offloading  wound care per burn recs s/p debridment  DME's for home  wound care for home  high risk 2/2 advanced debility/ age  medically stable for discharge home 84 yof with pmh of severe advanced dementia, bedbound/ functional quadriplegia, nonverbal, htn, dld, pressure ulcers here with FTT/ severe protein calorie malnutrition, infected trochanter/ sacral/ ulcers s/p debridement, s/p G tube tolerating feeds      G tube feeds  HOB/ aspiration precautions  offloading  wound care per burn recs s/p debridment  DME's for home  wound care for home  complete po abx course per ID recommendation  high risk 2/2 advanced debility/ age  medically stable for discharge home  outpatient PMD/ burn/ nutrition f/u

## 2019-01-18 NOTE — PROGRESS NOTE ADULT - ATTENDING COMMENTS
Patient seen and examined independently of resident. Case discussed with housestaff, nursing and patient
Patient seen and examined independently of resident. Case discussed with housestaff, nursing and patient and home care discharge planning staff  Patient seen independently of resident.  >30 minutes spent coordinating discharge planning, medicine reconciliation, follow up plan, and direct patient encounter  see discharge summary for further recommendation
Pt seen and examined at bedside independently.  Unable to participate as pt has dementia.   Vitals stable, afebrile  Exam stable  Labs reviewed  Plan continue current management   Pt going for Sacral debridement 1/14/2019, NPo past midnight

## 2019-01-18 NOTE — PROGRESS NOTE ADULT - PROVIDER SPECIALTY LIST ADULT
Burn
Hospitalist
Infectious Disease
Internal Medicine
Infectious Disease
Hospitalist

## 2019-01-18 NOTE — CHART NOTE - NSCHARTNOTEFT_GEN_A_CORE
Registered Dietitian Follow-Up     Patient Profile Reviewed                           Yes [x]   No []     Nutrition History Previously Obtained        Yes []  No [x]       Pertinent Subjective Information: Pt NPO as to have PEG placement tomorrow 1/16. Spoke w/ family member- pt NKFA, no vit/min supplementation PTA. Pt UBW unknown, however pt w/ wt loss      Scroll to bottom of page for TF recs****    Pertinent Medical Interventions: Pt admitted w/ poor PO intake. Per LIP, pt FTT, malnourished. W/ weakness likely 2/2 met encephalopathy d/t hypernatremia d/y dehydration. Multiple failed NG placements, PEG to be placed-planned for tomorrow.     Diet order: NPO     Anthropometrics:  - Ht. 64"  - Wt. 45.9kg no updated wts  - %wt change  - BMI 17.4  - IBW 54.5kg     Pertinent Lab Data: (1/15) hgb 11.3, hct 36.3, s gluc 233    glucose likely elevated 2/2 d5w     Pertinent Meds: heparin, cefepime, flagyl, NS 75ml/hr, norvasc, vit C, lipitor, colace, MVI, zinc sulfate     Physical Findings:  - Appearance: pt w/ severe wasting of temple and clavicle muscle and orbital fat, underwt  - GI function: pt w/ fecal incontinence. pt is lethargic, ams/dementia, no documented BM, last BM unknown  - Tubes: to have PEG placed, multiple failed NG placements  - Oral/Mouth cavity: alternate means of nutrition per SLP  - Skin: L and R hip unstageable pressure injuries, R heel suspected DTI, Sacrum DTI, L inner knee stage III pressure injuries     Nutrition Requirements  Weight Used: 45.9kg, admit wt     Estimated Energy Needs    Continue [x]  Adjust []  Adjusted Energy Recommendations:   kcal/day   1090-1365kcal (MSJ x1.2-1.5AF) pt underwt, malnourished per LIP, FTT, multiple pressure injuries       Estimated Protein Needs    Continue [x]  Adjust []  Adjusted Protein Recommendations:   gm/day   57-69g (1.25-1.5g/kg) pt multiple pressure injuries, FTT, underwt     Estimated Fluid Needs        Continue [x]  Adjust []  Adjusted Fluid Recommendations:   mL/day   per LIP    Nutrient Intake: NPO, PEG placement today        [] Previous Nutrition Diagnosis: Inadequate protein-energy intake            [x] Ongoing          [] Resolved  as feeds have not been initiated  [] No active nutrition diagnosis identified at this time     Nutrition Diagnostic #1  Problem: Severe PCM of chronic illness  Etiology: decreased appetite/inability to feed self 2/2 dementia  Statement: pt family reports PO <75% of estimated needs x2-3 years, severe wasting of temple and clavicle muscle and orbital fat    Nutrition Intervention: enteral nutrition  Osmolite 1.0 @240ml x5 feeds + Prosource TF BID, to provide 100% of kcal needs and 104% of protein needs: 1280 kcal, 72g Pro, 1008ml free h2o.   Free h2o flushes per LIP.     Upon initiation: Day 1: 120 ml q6hrs.   If tolerated, Day 2: 240 q6hrs.   If tolerated, Day 3: advance to goal rate  As admitted w/ poor PO, pt at risk for refeeding syndrome, monitor electrolytes (K, Phos, Mg) closely upon advancing feeds.     Goal/Expected Outcome: Pt to tolerate and meet 85% of needs and not to exceed 105% within 3 days    Indicator/Monitoring: RD to monitor energy intake, diet order, NFPF, body comp, glucose and renal profile    Pt at risk, f/u 3 days. Registered Dietitian Follow-Up     Patient Profile Reviewed                           Yes [x]   No []     Nutrition History Previously Obtained        Yes []  No [x]      Pertinent Medical Interventions: Pt p/w failure to thrive, malnutrition, infected bilateral IT stage IV ulcers, a sacral DTI POA, heel ulcer POA, and medial knee skin breakdown poa. Ulcers were debrided and treated with IV abx. Patient was set up with PEG access for feedings for her malnutrition secondary to advanced dementia. She was set up with home services including PEG feedings, home medical equipment home wound-care     Diet order: Osmolite 1.0 at 240 mL 5x/day + Prosource TF q12hrs. Regimen at goal provides 1362 kcal, 75 g protein, 1008 mL free H2O. Pt currently tolerating EN initiation per staff.     Anthropometrics:  - Ht. 162.56 cm.  - Wt. 45.9 kg. - no new wt  - BMI 17.4  - IBW 54.5 kg.     Pertinent Lab Data: 1/17: H/H-10.6/33.9, BUN-8, gluc-131, PO4-2.3 (WDL), Mg-2.4 (WDL), K-3.5 (WDL); per previous admit records: 12/22/18 [id number]%     Pertinent Meds: amlodipine, colace, MVI, zinc sulfate, thiamine, Vit C     Physical Findings:  - Appearance: Severe wasting of temple and clavicle muscle and orbital fat. Confused, disoriented, unable to speak.  - GI function: Last BM 1/18. Abd noted non-distended.  - Tubes: PEG  - Oral/Mouth cavity: Per 1/15 SLP eval - SLP attempted trials of puree; pt with no swallow trigger noted despite max cues. Further trials not provided 2' pt at high risk for aspiration. Recommends NPO with alternate nutrition/hydration.  - Skin: L and R hip unstageable pressure injuries, R heel suspected DTI, Sacrum DTI, L inner knee stage III pressure injuries     Nutrition Requirements  Weight Used: 45.9kg ABW     Estimated Energy Needs    Continue []  Adjust [x]  Energy Recommendations: ~8216-9396 kcal/day; obtained by comparing 1090-1365kcal (MSJ x1.2-1.5AF) vs. 3256-1951 kcal/day; pt underwt, malnourished per LIP, FTT, multiple pressure injuries - will use higher range as such.       Estimated Protein Needs    Continue []  Adjust [x]  Adjusted Protein Recommendations:   gm/day  64-78 g/day; (1.4-1.7 g/kg ABW) range increased as pt with multiple pressure injuries, FTT, underwt     Estimated Fluid Needs        Continue []  Adjust [x]  1 mL/kcal    Nutrient Intake: Current EN regimen at goal to provide 100% of protein, kcal needs at goal. Will switch to fiber containing formula.        [x] Previous Nutrition Diagnosis 1: Inadequate protein-energy intake (improved) - pt has not received goal EN regimen, as such inadequate protein-energy intake remains.            [x] Ongoing          [] Resolved    [x] Previous Nutrition Diagnosis 2: Malnutrition            [x] Ongoing          [] Resolved    Nutrition Intervention: Enteral Nutrition    Goal/Expected Outcome: Pt to meet >85% & <105% of estimated nutrient needs over next 4 days.    Indicator/Monitoring: Diet order, energy intake, glucose profile, renal profile, nutrition focused physical findings, body composition, micronutrient intake.    Recommendation: Change EN formula to Glucerna 1.2 at goal rate 240 mL 5x/day (total 1200 mL/day). Provide 50 mL flushes. Regimen at goal to provide 1440 kcal, 72 g protein, 1472 mL free H2O. Chose this formula as pt with elevated blood glucose levels this admit + elevated HgbA1C per December 2018 admit records (12/22/18 HgbA1C 6.6%).

## 2019-01-18 NOTE — PROGRESS NOTE ADULT - SUBJECTIVE AND OBJECTIVE BOX
JUAQUIN TEIXEIRA  84y  Female      Patient is a 84y old  Female who presents with a chief complaint of Dec PO intake (10 Carlos 2019 18:30)      INTERVAL HPI/OVERNIGHT EVENTS: minimally verbal. denies pain. resting peacefully. cannot obtain more extensive ROS secondary to advanced dementia      REVIEW OF SYSTEMS:  as above  All other review of systems negative    T(C): 36.6 (01-11-19 @ 05:28), Max: 37 (01-10-19 @ 21:00)  HR: 73 (01-11-19 @ 05:28) (73 - 88)  BP: 160/63 (01-11-19 @ 05:28) (107/58 - 160/63)  RR: 19 (01-11-19 @ 05:28) (18 - 19)  SpO2: 98% (01-10-19 @ 22:00) (98% - 98%)  Wt(kg): --Vital Signs Last 24 Hrs  T(C): 36.6 (11 Jan 2019 05:28), Max: 37 (10 Carlos 2019 21:00)  T(F): 97.9 (11 Jan 2019 05:28), Max: 98.6 (10 Carlos 2019 21:00)  HR: 73 (11 Jan 2019 05:28) (73 - 88)  BP: 160/63 (11 Jan 2019 05:28) (107/58 - 160/63)  BP(mean): --  RR: 19 (11 Jan 2019 05:28) (18 - 19)  SpO2: 98% (10 Carlos 2019 22:00) (98% - 98%)        PHYSICAL EXAM:  GENERAL: NAD, frail  PSYCH: no agitation, baseline mentation which is confused, nonverbal  NERVOUS SYSTEM:  awake Oriented X1, no new focal deficits  PULMONARY: Clear to percussion bilaterally; No rales, rhonchi, wheezing, or rubs  CARDIOVASCULAR: Regular rate and rhythm; No murmurs, rubs, or gallops  GI: Soft, Nontender, Nondistended; Bowel sounds present  EXTREMITIES:  contracted, no edema  SKIN: b/l trochanteric breakdown poa st 3/4 about 4cm diameter L worse than R, sacrum st 2 breakdown with dti poa unchanged, medial knee breakdown poa ; dry    Consultant(s) Notes Reviewed:  [x ] YES  [ ] NO    Discussed with Consultants/Other Providers [ x] YES     LABS                          10.7   8.20  )-----------( 188      ( 11 Jan 2019 05:34 )             35.1     01-11    144  |  104  |  16  ----------------------------<  250<H>  3.9   |  20  |  0.5<L>    Ca    8.1<L>      11 Jan 2019 05:34  Phos  2.5     01-10  Mg     2.3     01-10    TPro  7.3  /  Alb  3.5  /  TBili  0.3  /  DBili  x   /  AST  59<H>  /  ALT  74<H>  /  AlkPhos  87  01-09        PT/INR - ( 10 Carlos 2019 07:04 )   PT: 15.10 sec;   INR: 1.32 ratio         PTT - ( 10 Carlos 2019 07:04 )  PTT:23.0 sec  Lactate Trend  01-09 @ 17:36 Lactate:1.9         CAPILLARY BLOOD GLUCOSE            RADIOLOGY & ADDITIONAL TESTS:    Imaging Personally Reviewed:  [ ] YES  [ ] NO    HEALTH ISSUES - PROBLEM Dx:
AM rounds  No acute events o/n  Vital Signs Last 24 Hrs  T(C): 36.2 (16 Jan 2019 12:06), Max: 36.5 (15 Carlos 2019 22:06)  T(F): 97.2 (16 Jan 2019 09:10), Max: 97.7 (15 Carlos 2019 22:06)  HR: 83 (16 Jan 2019 13:45) (83 - 94)  BP: 116/69 (16 Jan 2019 13:45) (116/61 - 134/64)  RR: 16 (16 Jan 2019 13:45) (16 - 19)  SpO2: 99% (16 Jan 2019 13:32) (99% - 99%)                        11.1   7.70  )-----------( 221      ( 16 Jan 2019 06:46 )             35.2       EXAM:  Left hip wound - central exposed ligamentous tissue   right hip wound - central yellow unhealthy tissue ; pink periphery     dressings changed
JUAQUIN TEIXEIRA  84y  Female      Patient is a 84y old  Female who presents with a chief complaint of Dec PO intake (11 Jan 2019 11:48)      INTERVAL HPI/OVERNIGHT EVENTS: minimally verbal, no acute complaints      REVIEW OF SYSTEMS:  unable to perform extensive ros 2/2 dementia  All other review of systems negative    T(C): 37.1 (01-12-19 @ 13:22), Max: 37.1 (01-12-19 @ 13:22)  HR: 87 (01-12-19 @ 13:22) (78 - 89)  BP: 125/58 (01-12-19 @ 13:22) (94/44 - 130/63)  RR: 18 (01-12-19 @ 13:22) (18 - 18)  SpO2: 98% (01-12-19 @ 09:36) (98% - 98%)  Wt(kg): --Vital Signs Last 24 Hrs  T(C): 37.1 (12 Jan 2019 13:22), Max: 37.1 (12 Jan 2019 13:22)  T(F): 98.8 (12 Jan 2019 13:22), Max: 98.8 (12 Jan 2019 13:22)  HR: 87 (12 Jan 2019 13:22) (78 - 89)  BP: 125/58 (12 Jan 2019 13:22) (94/44 - 130/63)  BP(mean): --  RR: 18 (12 Jan 2019 13:22) (18 - 18)  SpO2: 98% (12 Jan 2019 09:36) (98% - 98%)        PHYSICAL EXAM:  GENERAL: NAD, temporal wasting  PSYCH: no agitation, baseline mentation which is confused  NERVOUS SYSTEM:  Alert & Oriented X1, no new focal deficits  PULMONARY: Clear to percussion bilaterally; No rales, rhonchi, wheezing, or rubs  CARDIOVASCULAR: Regular rate and rhythm; No murmurs, rubs, or gallops  GI: Soft, Nontender, Nondistended; Bowel sounds present  EXTREMITIES:  2+ Peripheral Pulses, No clubbing, cyanosis, or edema, contracted  SKIN dry with unchanged deep b/l IT pressure sores 3/4 POA and sacrum St 2/DTI POA dressed    Consultant(s) Notes Reviewed:  [x ] YES  [ ] NO    Discussed with Consultants/Other Providers [ x] YES     LABS                          11.3   7.89  )-----------( 157      ( 12 Jan 2019 06:41 )             35.7     01-12    141  |  101  |  8<L>  ----------------------------<  260<H>  3.3<L>   |  22  |  0.6<L>    Ca    8.5      12 Jan 2019 06:41  Phos  2.6     01-12  Mg     2.0     01-12            Lactate Trend  01-09 @ 17:36 Lactate:1.9         CAPILLARY BLOOD GLUCOSE            RADIOLOGY & ADDITIONAL TESTS:    Imaging Personally Reviewed:  [ ] YES  [ ] NO    HEALTH ISSUES - PROBLEM Dx:
JUAQUIN TEIXEIRA  84y  Female      Patient is a 84y old  Female who presents with a chief complaint of Dec PO intake (14 Jan 2019 11:25)      INTERVAL HPI/OVERNIGHT EVENTS:      ******************************* REVIEW OF SYSTEMS:**********************************************  Unable to obtain as   All other review of systems negative    *********************** VITALS ******************************************    T(F): 97.1 (01-14-19 @ 06:10)  HR: 79 (01-14-19 @ 06:10) (79 - 87)  BP: 125/57 (01-14-19 @ 06:10) (121/55 - 130/68)  RR: 16 (01-14-19 @ 06:10) (16 - 18)  SpO2: 96% (01-14-19 @ 00:32) (96% - 96%)            ******************************** PHYSICAL EXAM:**************************************************  GENERAL: NAD    PSYCH: no agitation, baseline mentation  HEENT:     NERVOUS SYSTEM:  Alert & Oriented X1  PULMONARY: SMITA, CTA    CARDIOVASCULAR: S1S2 RRR    GI: Soft, NT, ND; BS present.    EXTREMITIES:  2+ Peripheral Pulses, No clubbing, cyanosis, or edema    LYMPH: No lymphadenopathy noted    SKIN: No rashes or lesions    ******************************************************************************************    Consultant(s) Notes Reviewed:  [x ] YES  [ ] NO    Discussed with Consultants/Other Providers [ x] YES     **************************** LABS *******************************************************                          12.0   8.70  )-----------( 198      ( 13 Jan 2019 07:41 )             36.9     01-13    139  |  100  |  7<L>  ----------------------------<  244<H>  3.8   |  19  |  0.8    Ca    8.5      13 Jan 2019 07:41  Phos  2.5     01-13  Mg     2.1     01-13            Lactate Trend  01-09 @ 17:36 Lactate:1.9         CAPILLARY BLOOD GLUCOSE              **************************Active Medications *******************************************  penicillin (Unknown)      amLODIPine   Tablet 10 milliGRAM(s) Oral daily  ascorbic acid 500 milliGRAM(s) Oral daily  aspirin enteric coated 81 milliGRAM(s) Oral daily  atorvastatin 80 milliGRAM(s) Oral at bedtime  chlorhexidine 4% Liquid 1 Application(s) Topical <User Schedule>  ciprofloxacin   IVPB 400 milliGRAM(s) IV Intermittent every 12 hours  collagenase Ointment 1 Application(s) Topical two times a day  collagenase Ointment 1 Application(s) Topical daily  Dakins Solution - Full Strength 1 Application(s) Topical daily  dextrose 5%. 1000 milliLiter(s) IV Continuous <Continuous>  docusate sodium 100 milliGRAM(s) Oral two times a day  heparin  Injectable 5000 Unit(s) SubCutaneous every 8 hours  memantine 10 milliGRAM(s) Oral daily  metroNIDAZOLE  IVPB 500 milliGRAM(s) IV Intermittent every 8 hours  multivitamin 1 Tablet(s) Oral daily  vancomycin  IVPB 1250 milliGRAM(s) IV Intermittent every 12 hours  vancomycin  IVPB      zinc sulfate 220 milliGRAM(s) Oral daily      ***************************************************  RADIOLOGY & ADDITIONAL TESTS:    Imaging Personally Reviewed:  [ ] YES  [ ] NO    HEALTH ISSUES - PROBLEM Dx:
JUAQUIN TEIXEIRA  84y  Female      Patient is a 84y old  Female who presents with a chief complaint of Dec PO intake (15 Carlos 2019 08:47)      INTERVAL HPI/OVERNIGHT EVENTS:      ******************************* REVIEW OF SYSTEMS:**********************************************    All other review of systems negative    *********************** VITALS ******************************************    T(F): 97.6 (01-15-19 @ 06:30)  HR: 88 (01-15-19 @ 06:30) (75 - 88)  BP: 120/59 (01-15-19 @ 06:30) (108/80 - 153/65)  RR: 16 (01-15-19 @ 06:30) (12 - 18)  SpO2: 98% (01-14-19 @ 19:10) (98% - 100%)    01-14-19 @ 07:01  -  01-15-19 @ 07:00  --------------------------------------------------------  IN: 600 mL / OUT: 0 mL / NET: 600 mL            01-14-19 @ 07:01  -  01-15-19 @ 07:00  --------------------------------------------------------  IN: 600 mL / OUT: 0 mL / NET: 600 mL        ******************************** PHYSICAL EXAM:**************************************************  GENERAL: NAD    PSYCH: no agitation, baseline mentation  HEENT:     NERVOUS SYSTEM:  Alert & Oriented X0-1    PULMONARY: SMITA, CTA    CARDIOVASCULAR: S1S2 RRR    GI: Soft, NT, ND; BS present.    EXTREMITIES:  2+ Peripheral Pulses, No clubbing, cyanosis, or edema    LYMPH: No lymphadenopathy noted    SKIN: sacral decubiti with dressing.    ******************************************************************************************    Consultant(s) Notes Reviewed:  [x ] YES  [ ] NO    Discussed with Consultants/Other Providers [ x] YES     **************************** LABS *******************************************************                          11.3   7.46  )-----------( 206      ( 15 Carlos 2019 06:01 )             36.3     01-15    144  |  102  |  12  ----------------------------<  233<H>  3.5   |  22  |  1.2    Ca    8.6      15 Carlos 2019 06:01  Phos  3.0     01-15  Mg     2.0     01-15          PT/INR - ( 14 Jan 2019 22:17 )   PT: 16.70 sec;   INR: 1.46 ratio         PTT - ( 14 Jan 2019 22:17 )  PTT:26.0 sec  Lactate Trend        CAPILLARY BLOOD GLUCOSE      POCT Blood Glucose.: 261 mg/dL (15 Carlos 2019 11:56)          **************************Active Medications *******************************************  penicillin (Unknown)      amLODIPine   Tablet 10 milliGRAM(s) Oral daily  ascorbic acid 500 milliGRAM(s) Oral daily  aspirin enteric coated 81 milliGRAM(s) Oral daily  atorvastatin 80 milliGRAM(s) Oral at bedtime  cefepime   IVPB 1000 milliGRAM(s) IV Intermittent every 12 hours  chlorhexidine 4% Liquid 1 Application(s) Topical <User Schedule>  collagenase Ointment 1 Application(s) Topical two times a day  Dakins Solution - 1/2 Strength 1 Application(s) Topical two times a day  dextrose 5%. 1000 milliLiter(s) IV Continuous <Continuous>  docusate sodium 100 milliGRAM(s) Oral two times a day  heparin  Injectable 5000 Unit(s) SubCutaneous every 8 hours  memantine 10 milliGRAM(s) Oral daily  metroNIDAZOLE  IVPB 500 milliGRAM(s) IV Intermittent every 8 hours  multivitamin 1 Tablet(s) Oral daily  zinc sulfate 220 milliGRAM(s) Oral daily      ***************************************************  RADIOLOGY & ADDITIONAL TESTS:    Imaging Personally Reviewed:  [ ] YES  [ ] NO    HEALTH ISSUES - PROBLEM Dx:
JUAQUIN TEIXEIRA  84y  Female      Patient is a 84y old  Female who presents with a chief complaint of Dec PO intake (16 Jan 2019 14:55)      INTERVAL HPI/OVERNIGHT EVENTS: nonverbal. no complaints. appears comfortable s/p G tube      REVIEW OF SYSTEMS:  as above  All other review of systems negative    T(C): 36.1 (01-16-19 @ 14:52), Max: 36.5 (01-15-19 @ 22:06)  HR: 82 (01-16-19 @ 14:52) (82 - 94)  BP: 133/60 (01-16-19 @ 14:52) (116/61 - 134/64)  RR: 18 (01-16-19 @ 14:52) (16 - 19)  SpO2: 99% (01-16-19 @ 13:32) (99% - 99%)  Wt(kg): --Vital Signs Last 24 Hrs  T(C): 36.1 (16 Jan 2019 14:52), Max: 36.5 (15 Carlos 2019 22:06)  T(F): 97 (16 Jan 2019 14:52), Max: 97.7 (15 Carlos 2019 22:06)  HR: 82 (16 Jan 2019 14:52) (82 - 94)  BP: 133/60 (16 Jan 2019 14:52) (116/61 - 134/64)  BP(mean): --  RR: 18 (16 Jan 2019 14:52) (16 - 19)  SpO2: 99% (16 Jan 2019 13:32) (99% - 99%)      01-15-19 @ 07:01  -  01-16-19 @ 07:00  --------------------------------------------------------  IN: 1550 mL / OUT: 0 mL / NET: 1550 mL        PHYSICAL EXAM:  GENERAL: NAD, emaciated  PSYCH: no agitation, baseline mentation  NERVOUS SYSTEM:  awake, no new focal deficits  PULMONARY: Clear to percussion bilaterally; No rales, rhonchi, wheezing, or rubs  CARDIOVASCULAR: Regular rate and rhythm; No murmurs, rubs, or gallops  GI: Soft, Nontender, Nondistended; Bowel sounds present, G tube CDI, quite thin  EXTREMITIES:  2+ Peripheral Pulses, No clubbing, cyanosis, or edema  SKIN b/l IT ulcers debrided/dressed POA, sacrum dressed    Consultant(s) Notes Reviewed:  [x ] YES  [ ] NO    Discussed with Consultants/Other Providers [ x] YES     LABS                          11.1   7.70  )-----------( 221      ( 16 Jan 2019 06:46 )             35.2     01-16    139  |  102  |  11  ----------------------------<  188<H>  3.8   |  23  |  1.1    Ca    8.1<L>      16 Jan 2019 06:46  Phos  2.2     01-16  Mg     2.1     01-16          PT/INR - ( 16 Jan 2019 06:46 )   PT: 15.60 sec;   INR: 1.36 ratio         PTT - ( 16 Jan 2019 06:46 )  PTT:49.2 sec  Lactate Trend        CAPILLARY BLOOD GLUCOSE      POCT Blood Glucose.: 206 mg/dL (16 Jan 2019 14:44)        RADIOLOGY & ADDITIONAL TESTS:    Imaging Personally Reviewed:  [ ] YES  [ ] NO    HEALTH ISSUES - PROBLEM Dx:
JUAQUIN TEIXEIRA  84y  Female      Patient is a 84y old  Female who presents with a chief complaint of Dec PO intake (17 Jan 2019 09:50)      INTERVAL HPI/OVERNIGHT EVENTS: started feeds. tolerating per nursing, no n/v. nonverbal. no complaints      REVIEW OF SYSTEMS:  as above  All other review of systems negative    T(C): 37.2 (01-17-19 @ 13:27), Max: 37.2 (01-17-19 @ 13:27)  HR: 97 (01-17-19 @ 13:27) (81 - 97)  BP: 120/57 (01-17-19 @ 13:27) (118/72 - 122/69)  RR: 18 (01-17-19 @ 13:27) (18 - 18)  SpO2: 98% (01-17-19 @ 08:15) (96% - 98%)  Wt(kg): --Vital Signs Last 24 Hrs  T(C): 37.2 (17 Jan 2019 13:27), Max: 37.2 (17 Jan 2019 13:27)  T(F): 98.9 (17 Jan 2019 13:27), Max: 98.9 (17 Jan 2019 13:27)  HR: 97 (17 Jan 2019 13:27) (81 - 97)  BP: 120/57 (17 Jan 2019 13:27) (118/72 - 122/69)  BP(mean): --  RR: 18 (17 Jan 2019 13:27) (18 - 18)  SpO2: 98% (17 Jan 2019 08:15) (96% - 98%)      01-16-19 @ 07:01  -  01-17-19 @ 07:00  --------------------------------------------------------  IN: 725 mL / OUT: 0 mL / NET: 725 mL        PHYSICAL EXAM:  GENERAL: NAD temporal wasting, frail  PSYCH: no agitation, baseline mentation which is nonverbal  NERVOUS SYSTEM: awake but nonverbal, no new focal deficits  PULMONARY: Clear to percussion bilaterally; No rales, rhonchi, wheezing, or rubs  CARDIOVASCULAR: Regular rate and rhythm; No murmurs, rubs, or gallops  GI: Soft, Nontender, Nondistended; Bowel sounds present G tube site CDI  EXTREMITIES:  2+ Peripheral Pulses, No clubbing, cyanosis, or edema contracted  SKIN: b/l IT s/p debridement cdi, sacrum dressed, L medial knee dressed as prior, improved s/p debridement    Consultant(s) Notes Reviewed:  [x ] YES  [ ] NO    Discussed with Consultants/Other Providers [ x] YES     LABS                          10.6   7.74  )-----------( 248      ( 17 Jan 2019 05:31 )             33.9     01-17    143  |  104  |  8<L>  ----------------------------<  131<H>  3.5   |  22  |  1.0    Ca    8.2<L>      17 Jan 2019 05:31  Phos  2.3     01-17  Mg     2.4     01-17          PT/INR - ( 16 Jan 2019 06:46 )   PT: 15.60 sec;   INR: 1.36 ratio         PTT - ( 16 Jan 2019 06:46 )  PTT:49.2 sec  Lactate Trend        CAPILLARY BLOOD GLUCOSE      POCT Blood Glucose.: 154 mg/dL (17 Jan 2019 11:47)        RADIOLOGY & ADDITIONAL TESTS:    Imaging Personally Reviewed:  [ ] YES  [ ] NO    HEALTH ISSUES - PROBLEM Dx:
JUAQUIN TEIXEIRA  84y  Female      Patient is a 84y old  Female who presents with a chief complaint of Dec PO intake (18 Jan 2019 14:50)      INTERVAL HPI/OVERNIGHT EVENTS: nonverbal. tolerating G tube feedings. set up with home care, G tube feeds, will having DME delivery tomorrow. awaiting transport home. nurses instructed family on how to work G tube feedings. wound care arranged for home      REVIEW OF SYSTEMS:  nonverbal  All other review of systems negative    T(C): 36.4 (01-18-19 @ 13:59), Max: 37.2 (01-17-19 @ 20:00)  HR: 94 (01-18-19 @ 13:59) (80 - 94)  BP: 120/56 (01-18-19 @ 13:59) (103/51 - 139/59)  RR: 16 (01-18-19 @ 13:59) (16 - 18)  SpO2: --  Wt(kg): --Vital Signs Last 24 Hrs  T(C): 36.4 (18 Jan 2019 13:59), Max: 37.2 (17 Jan 2019 20:00)  T(F): 97.6 (18 Jan 2019 13:59), Max: 98.9 (17 Jan 2019 20:00)  HR: 94 (18 Jan 2019 13:59) (80 - 94)  BP: 120/56 (18 Jan 2019 13:59) (103/51 - 139/59)  BP(mean): --  RR: 16 (18 Jan 2019 13:59) (16 - 18)  SpO2: --      01-17-19 @ 07:01  -  01-18-19 @ 07:00  --------------------------------------------------------  IN: 1540 mL / OUT: 401 mL / NET: 1139 mL    01-18-19 @ 07:01  -  01-18-19 @ 17:01  --------------------------------------------------------  IN: 400 mL / OUT: 200 mL / NET: 200 mL        PHYSICAL EXAM:  GENERAL: NAD, emaciated  PSYCH: no agitation, baseline mentation which is minimally interactive  NERVOUS SYSTEM:  awake, no new focal deficits  PULMONARY: Clear to percussion bilaterally; No rales, rhonchi, wheezing, or rubs, comfortable on RA  CARDIOVASCULAR: Regular rate and rhythm; No murmurs, rubs, or gallops  GI: Soft, Nontender, Nondistended; Bowel sounds present, G tube site CDI  EXTREMITIES:  2+ Peripheral Pulses, No clubbing, cyanosis, or edema, contracted  SKIN b/l troch st 4 dressed, sacrum dti dressed, heel dressed, medial knee dressed, all wounds POA/ debrided, and appear better than upon presentation    Consultant(s) Notes Reviewed:  [x ] YES  [ ] NO    Discussed with Consultants/Other Providers [ x] YES     LABS                          10.6   7.74  )-----------( 248      ( 17 Jan 2019 05:31 )             33.9     01-17    143  |  104  |  8<L>  ----------------------------<  131<H>  3.5   |  22  |  1.0    Ca    8.2<L>      17 Jan 2019 05:31  Phos  2.3     01-17  Mg     2.4     01-17            Lactate Trend        CAPILLARY BLOOD GLUCOSE      POCT Blood Glucose.: 154 mg/dL (17 Jan 2019 11:47)        RADIOLOGY & ADDITIONAL TESTS:    Imaging Personally Reviewed:  [ ] YES  [ ] NO    HEALTH ISSUES - PROBLEM Dx:
JUAQUIN TEIXEIRA  84y, Female      OVERNIGHT EVENTS:    no fever, non communicative  alert, NAD    VITALS:  T(F): 97.1, Max: 98.6 (01-13-19 @ 13:25)  HR: 79  BP: 125/57  RR: 16Vital Signs Last 24 Hrs  T(C): 36.2 (14 Jan 2019 06:10), Max: 37 (13 Jan 2019 13:25)  T(F): 97.1 (14 Jan 2019 06:10), Max: 98.6 (13 Jan 2019 13:25)  HR: 79 (14 Jan 2019 06:10) (79 - 87)  BP: 125/57 (14 Jan 2019 06:10) (121/55 - 130/68)  BP(mean): --  RR: 16 (14 Jan 2019 06:10) (16 - 18)  SpO2: 96% (14 Jan 2019 00:32) (96% - 96%)    TESTS & MEASUREMENTS:                        12.0   8.70  )-----------( 198      ( 13 Jan 2019 07:41 )             36.9     01-13    139  |  100  |  7<L>  ----------------------------<  244<H>  3.8   |  19  |  0.8    Ca    8.5      13 Jan 2019 07:41  Phos  2.5     01-13  Mg     2.1     01-13          Culture - Blood (collected 01-10-19 @ 07:04)  Source: .Blood None  Preliminary Report (01-11-19 @ 18:04):    No growth to date.            RADIOLOGY & ADDITIONAL TESTS:    ANTIBIOTICS:  ciprofloxacin   IVPB 400 milliGRAM(s) IV Intermittent every 12 hours  metroNIDAZOLE  IVPB 500 milliGRAM(s) IV Intermittent every 8 hours  vancomycin  IVPB 1250 milliGRAM(s) IV Intermittent every 12 hours  vancomycin  IVPB
JUAQUIN TEIXEIRA  84y, Female      OVERNIGHT EVENTS:    no fevers  Debridement  01/14/2019  sharp excisional debridement bilateral hips     VITALS:  T(F): 97.6, Max: 98 (01-14-19 @ 18:10)  HR: 88  BP: 120/59  RR: 16Vital Signs Last 24 Hrs  T(C): 36.4 (15 Carlos 2019 06:30), Max: 36.7 (14 Jan 2019 18:10)  T(F): 97.6 (15 Carlos 2019 06:30), Max: 98 (14 Jan 2019 18:10)  HR: 88 (15 Carlos 2019 06:30) (75 - 88)  BP: 120/59 (15 Carlos 2019 06:30) (108/80 - 153/65)  BP(mean): --  RR: 16 (15 Carlos 2019 06:30) (12 - 18)  SpO2: 98% (14 Jan 2019 19:10) (98% - 100%)    TESTS & MEASUREMENTS:                        11.3   7.46  )-----------( 206      ( 15 Carlos 2019 06:01 )             36.3     01-15    144  |  102  |  12  ----------------------------<  233<H>  3.5   |  22  |  1.2    Ca    8.6      15 Carlos 2019 06:01  Phos  3.0     01-15  Mg     2.0     01-15          Culture - Blood (collected 01-10-19 @ 07:04)  Source: .Blood None  Preliminary Report (01-11-19 @ 18:04):    No growth to date.            RADIOLOGY & ADDITIONAL TESTS:    ANTIBIOTICS:  cefepime   IVPB 1000 milliGRAM(s) IV Intermittent every 12 hours  metroNIDAZOLE  IVPB 500 milliGRAM(s) IV Intermittent every 8 hours
JUAQUIN TEIXEIRA  84y, Female      OVERNIGHT EVENTS:    none    VITALS:  T(F): 97.2, Max: 97.7 (01-15-19 @ 22:06)  HR: 89  BP: 134/64  RR: 18Vital Signs Last 24 Hrs  T(C): 36.2 (16 Jan 2019 09:10), Max: 36.5 (15 Carlos 2019 22:06)  T(F): 97.2 (16 Jan 2019 09:10), Max: 97.7 (15 Carlso 2019 22:06)  HR: 89 (16 Jan 2019 09:10) (89 - 94)  BP: 134/64 (16 Jan 2019 09:10) (95/44 - 134/64)  BP(mean): --  RR: 18 (16 Jan 2019 09:10) (18 - 18)  SpO2: --    TESTS & MEASUREMENTS:                        11.1   7.70  )-----------( 221      ( 16 Jan 2019 06:46 )             35.2     01-16    139  |  102  |  11  ----------------------------<  188<H>  3.8   |  23  |  1.1    Ca    8.1<L>      16 Jan 2019 06:46  Phos  2.2     01-16  Mg     2.1     01-16          Culture - Blood (collected 01-10-19 @ 07:04)  Source: .Blood None  Final Report (01-15-19 @ 18:00):    No growth at 5 days.            RADIOLOGY & ADDITIONAL TESTS:    ANTIBIOTICS:  cefepime   IVPB 1000 milliGRAM(s) IV Intermittent every 24 hours  metroNIDAZOLE  IVPB 500 milliGRAM(s) IV Intermittent every 8 hours  vancomycin  IVPB 1000 milliGRAM(s) IV Intermittent every 12 hours
JUAQUIN TEXIEIRA  84y, Female      OVERNIGHT EVENTS:    none    VITALS:  T(F): 97, Max: 98.9 (01-17-19 @ 13:27)  HR: 80  BP: 103/51  RR: 18Vital Signs Last 24 Hrs  T(C): 36.1 (18 Jan 2019 05:14), Max: 37.2 (17 Jan 2019 13:27)  T(F): 97 (18 Jan 2019 05:14), Max: 98.9 (17 Jan 2019 13:27)  HR: 80 (18 Jan 2019 05:14) (80 - 97)  BP: 103/51 (18 Jan 2019 05:14) (103/51 - 139/59)  BP(mean): --  RR: 18 (18 Jan 2019 05:14) (18 - 18)  SpO2: --    TESTS & MEASUREMENTS:                        10.6   7.74  )-----------( 248      ( 17 Jan 2019 05:31 )             33.9     01-17    143  |  104  |  8<L>  ----------------------------<  131<H>  3.5   |  22  |  1.0    Ca    8.2<L>      17 Jan 2019 05:31  Phos  2.3     01-17  Mg     2.4     01-17          Culture - Surgical Swab (collected 01-15-19 @ 07:41)  Source: .Surgical Swab None  Preliminary Report (01-17-19 @ 15:00):    Numerous Enterococcus faecalis    Numerous Oligella urethralis "Susceptibilities not performed"  Organism: Enterococcus faecalis (01-17-19 @ 13:21)  Organism: Enterococcus faecalis (01-17-19 @ 13:21)      -  Ampicillin: S <=2 Predicts results to ampicillin/sulbactam, amoxacillin-clavulanate and  piperacillin-tazobactam.      -  Tetra/Doxy: R >8      -  Vancomycin: S 4      Method Type: PAOLA    Culture - Surgical Swab (collected 01-15-19 @ 07:41)  Source: .Surgical Swab None  Preliminary Report (01-17-19 @ 14:58):    Few Enterococcus faecalis    Numerous Oligella urethralis "Susceptibilities not performed"  Organism: Enterococcus faecalis (01-17-19 @ 13:22)  Organism: Enterococcus faecalis (01-17-19 @ 13:22)      -  Ampicillin: S <=2 Predicts results to ampicillin/sulbactam, amoxacillin-clavulanate and  piperacillin-tazobactam.      -  Tetra/Doxy: R >8      -  Vancomycin: S 4      Method Type: PAOLA            RADIOLOGY & ADDITIONAL TESTS:    ANTIBIOTICS:  cefepime   IVPB 1000 milliGRAM(s) IV Intermittent every 12 hours  metroNIDAZOLE  IVPB 500 milliGRAM(s) IV Intermittent every 12 hours  vancomycin  IVPB 1000 milliGRAM(s) IV Intermittent every 12 hours
LENGTH OF HOSPITAL STAY: 2d      CHIEF COMPLAINT:   Patient is a 84y old  Female who presents with a chief complaint of Dec PO intake (10 Carlos 2019 18:30)      OVER Past 24hrs:  The patient was seen and examined at bedside there were no acute events during the night the patient remans non verbal saud with Icelandic speaking RN tried to interpret.         PAST MEDICAL & SURGICAL HISTORY  PAST MEDICAL & SURGICAL HISTORY:  Bedsore  Pressure ulcer  Bedbound  Nonverbal  Arthritis  High cholesterol  Dementia  HTN (hypertension)  S/P appendectomy        REVIEW OF SYSTEMS  RESPIRATORY: No cough, wheezing,   CARDIOVASCULAR: No bnleg swelling  GASTROINTESTINAL: No melena or hematochezia.      ALLERGIES:  penicillin (Unknown)    MEDICATIONS:  STANDING MEDICATIONS  amLODIPine   Tablet 10 milliGRAM(s) Oral daily  ascorbic acid 500 milliGRAM(s) Oral daily  aspirin enteric coated 81 milliGRAM(s) Oral daily  atorvastatin 80 milliGRAM(s) Oral at bedtime  chlorhexidine 4% Liquid 1 Application(s) Topical <User Schedule>  ciprofloxacin   IVPB 400 milliGRAM(s) IV Intermittent every 12 hours  collagenase Ointment 1 Application(s) Topical daily  Dakins Solution - Full Strength 1 Application(s) Topical daily  dextrose 5%. 1000 milliLiter(s) IV Continuous <Continuous>  docusate sodium 100 milliGRAM(s) Oral two times a day  heparin  Injectable 5000 Unit(s) SubCutaneous every 8 hours  memantine 10 milliGRAM(s) Oral daily  multivitamin 1 Tablet(s) Oral daily  vancomycin  IVPB 1250 milliGRAM(s) IV Intermittent every 12 hours  vancomycin  IVPB      zinc sulfate 220 milliGRAM(s) Oral daily    PRN MEDICATIONS    VITALS:   T(F): 97.9  HR: 73  BP: 160/63  RR: 19  SpO2: 98%    PHYSICAL EXAM:  General: No acute distress.  Alert, oriented, interactive, nonfocal. elderly cachetic female.       PULM: Clear to auscultation bilaterally, no significant sputum production.    CVS: Regular rate and rhythm, no murmurs, rubs, or gallops.    ABD: Soft, nondistended, nontender, no masses.    EXT: No edema, nontender.    SKIN: Warm and well perfused, no rashes noted. Numerous decubitus ulcer with skin brake down.     LABS:                        10.7   8.20  )-----------( 188      ( 11 Jan 2019 05:34 )             35.1     01-11    144  |  104  |  16  ----------------------------<  250<H>  3.9   |  20  |  0.5<L>    Ca    8.1<L>      11 Jan 2019 05:34  Phos  2.5     01-10  Mg     2.3     01-10    TPro  7.3  /  Alb  3.5  /  TBili  0.3  /  DBili  x   /  AST  59<H>  /  ALT  74<H>  /  AlkPhos  87  01-09    PT/INR - ( 10 Carlos 2019 07:04 )   PT: 15.10 sec;   INR: 1.32 ratio         PTT - ( 10 Carlos 2019 07:04 )  PTT:23.0 sec              RADIOLOGY:    < from: Xray Chest 1 View- PORTABLE-Routine (01.10.19 @ 20:26) >    Findings:    Support devices: None.    Cardiac/mediastinum/hilum: Unremarkable.    Lung parenchyma/Pleura: Retrocardiac atelectasis.    Skeleton/soft tissues: Unchanged.    Impression:      Retrocardiac atelectasis.Follow-up as needed.    < end of copied text >
LENGTH OF HOSPITAL STAY: 4d      CHIEF COMPLAINT:   Patient is a 84y old  Female who presents with a chief complaint of Dec PO intake (12 Jan 2019 19:32)      OVER Past 24hrs:  The patient was seen and examined at bedside there were patient remains non verbal midly responsive, does not follow commands.       PAST MEDICAL & SURGICAL HISTORY  PAST MEDICAL & SURGICAL HISTORY:      REVIEW OF SYSTEMS  RESPIRATORY: No cough, wheezing,   CARDIOVASCULAR: No bnleg swelling  GASTROINTESTINAL: No melena or hematochezia.      ALLERGIES:  penicillin (Unknown)    MEDICATIONS:  STANDING MEDICATIONS  amLODIPine   Tablet 10 milliGRAM(s) Oral daily  ascorbic acid 500 milliGRAM(s) Oral daily  aspirin enteric coated 81 milliGRAM(s) Oral daily  atorvastatin 80 milliGRAM(s) Oral at bedtime  chlorhexidine 4% Liquid 1 Application(s) Topical <User Schedule>  ciprofloxacin   IVPB 400 milliGRAM(s) IV Intermittent every 12 hours  collagenase Ointment 1 Application(s) Topical two times a day  collagenase Ointment 1 Application(s) Topical daily  Dakins Solution - Full Strength 1 Application(s) Topical daily  dextrose 5%. 1000 milliLiter(s) IV Continuous <Continuous>  docusate sodium 100 milliGRAM(s) Oral two times a day  heparin  Injectable 5000 Unit(s) SubCutaneous every 8 hours  memantine 10 milliGRAM(s) Oral daily  multivitamin 1 Tablet(s) Oral daily  vancomycin  IVPB 1250 milliGRAM(s) IV Intermittent every 12 hours  vancomycin  IVPB      zinc sulfate 220 milliGRAM(s) Oral daily    PRN MEDICATIONS    VITALS:   T(F): 97.6  HR: 84  BP: 112/66  RR: 18  SpO2: 95%    PHYSICAL EXAM:  General: No acute distress.  Alert, oriented, interactive, nonfocal. elderly cachetic female.       PULM: Clear to auscultation bilaterally, no significant sputum production.    CVS: Regular rate and rhythm, no murmurs, rubs, or gallops.    ABD: Soft, nondistended, nontender, no masses.    EXT: No edema, nontender.    SKIN: Warm and well perfused, no rashes noted. Numerous decubitus ulcer with skin brake down.     LABS:                        11.3   7.89  )-----------( 157      ( 12 Jan 2019 06:41 )             35.7     01-12    141  |  101  |  8<L>  ----------------------------<  260<H>  3.3<L>   |  22  |  0.6<L>    Ca    8.5      12 Jan 2019 06:41  Phos  2.6     01-12  Mg     2.0     01-12                Culture - Blood (collected 10 Carlos 2019 07:04)  Source: .Blood None  Preliminary Report (11 Jan 2019 18:04):    No growth to date.
LENGTH OF HOSPITAL STAY: 5d      CHIEF COMPLAINT:   Patient is a 84y old  Female who presents with a chief complaint of Dec PO intake (14 Jan 2019 14:42)      OVER Past 24hrs:  The patient was seen and examined at bedside there were no acute events during the night. Patient remains in AMS. Vitals remain stable. NO evidence of fever chills.       PAST MEDICAL & SURGICAL HISTORY  PAST MEDICAL & SURGICAL HISTORY:  Bedsore  Pressure ulcer  Bedbound  Nonverbal  Arthritis  High cholesterol  Dementia  HTN (hypertension)  S/P appendectomy        REVIEW OF SYSTEMS  RESPIRATORY: No cough, wheezing,   CARDIOVASCULAR: No leg swelling  GASTROINTESTINAL: No melena or hematochezia.    ALLERGIES:  penicillin (Unknown)    MEDICATIONS:  STANDING MEDICATIONS    PRN MEDICATIONS    VITALS:   T(F): 97.7  HR: 84  BP: 111/46  RR: 16  SpO2: 96%    PHYSICAL EXAM:  General: No acute distress.  Alert, oriented, interactive, nonfocal. elderly cachetic female.       PULM: Clear to auscultation bilaterally, no significant sputum production.    CVS: Regular rate and rhythm, no murmurs, rubs, or gallops.    ABD: Soft, nondistended, nontender, no masses.    EXT: No edema, nontender.    SKIN: Warm and well perfused, no rashes noted. Numerous decubitus ulcer with skin brake down.     LABS:                        12.0   8.70  )-----------( 198      ( 13 Jan 2019 07:41 )             36.9     01-13    139  |  100  |  7<L>  ----------------------------<  244<H>  3.8   |  19  |  0.8    Ca    8.5      13 Jan 2019 07:41  Phos  2.5     01-13  Mg     2.1     01-13
LENGTH OF HOSPITAL STAY: 6d      CHIEF COMPLAINT:   Patient is a 84y old  Female who presents with a chief complaint of Dec PO intake (15 Carlos 2019 12:03)      OVER Past 24hrs:  The patient was seen and examined at bedside there were no acute events during the night. Started feeds today. No fever chill tolerating feeds         PAST MEDICAL & SURGICAL HISTORY  PAST MEDICAL & SURGICAL HISTORY:  Bedsore  Pressure ulcer  Bedbound  Nonverbal  Arthritis  High cholesterol  Dementia  HTN (hypertension)  S/P appendectomy        REVIEW OF SYSTEMS  RESPIRATORY: No cough, wheezing,   CARDIOVASCULAR: No leg swelling  GASTROINTESTINAL: No melena or hematochezia.    ALLERGIES:  penicillin (Unknown)    MEDICATIONS:  STANDING MEDICATIONS  amLODIPine   Tablet 10 milliGRAM(s) Oral daily  ascorbic acid 500 milliGRAM(s) Oral daily  aspirin enteric coated 81 milliGRAM(s) Oral daily  atorvastatin 80 milliGRAM(s) Oral at bedtime  cefepime   IVPB 1000 milliGRAM(s) IV Intermittent every 12 hours  chlorhexidine 4% Liquid 1 Application(s) Topical <User Schedule>  collagenase Ointment 1 Application(s) Topical two times a day  Dakins Solution - 1/2 Strength 1 Application(s) Topical two times a day  dextrose 5%. 1000 milliLiter(s) IV Continuous <Continuous>  docusate sodium 100 milliGRAM(s) Oral two times a day  docusate sodium Liquid 100 milliGRAM(s) Oral daily  heparin  Injectable 5000 Unit(s) SubCutaneous every 8 hours  memantine 10 milliGRAM(s) Oral daily  metroNIDAZOLE  IVPB 500 milliGRAM(s) IV Intermittent every 8 hours  multivitamin 1 Tablet(s) Oral daily  zinc sulfate 220 milliGRAM(s) Oral daily    PRN MEDICATIONS    VITALS:   T(F): 97.4  HR: 89  BP: 95/44  RR: 18  SpO2: 98%    PHYSICAL EXAM:  General: No acute distress.  Alert, oriented, interactive, nonfocal. elderly cachetic female.       PULM: Clear to auscultation bilaterally, no significant sputum production.    CVS: Regular rate and rhythm, no murmurs, rubs, or gallops.    ABD: Soft, nondistended, nontender, no masses.    EXT: No edema, nontender.    SKIN: Warm and well perfused, no rashes noted. Numerous decubitus ulcer with skin brake down.     LABS:                        11.3   7.46  )-----------( 206      ( 15 Carlos 2019 06:01 )             36.3     01-15    144  |  102  |  12  ----------------------------<  233<H>  3.5   |  22  |  1.2    Ca    8.6      15 Carlos 2019 06:01  Phos  3.0     01-15  Mg     2.0     01-15      PT/INR - ( 14 Jan 2019 22:17 )   PT: 16.70 sec;   INR: 1.46 ratio         PTT - ( 14 Jan 2019 22:17 )  PTT:26.0 sec
LENGTH OF HOSPITAL STAY: 7d      CHIEF COMPLAINT:   Patient is a 84y old  Female who presents with a chief complaint of Dec PO intake (16 Jan 2019 10:45)      OVER Past 24hrs:  The patient was seen and examined at bedside there were no acute events during the night.  Patient  tolerating feeds. NPO today for PEG placement. remains non verbal but open eye to mild stimulation.       PAST MEDICAL & SURGICAL HISTORY  PAST MEDICAL & SURGICAL HISTORY:  Bedsore  Pressure ulcer  Bedbound  Nonverbal  Arthritis  High cholesterol  Dementia  HTN (hypertension)  S/P appendectomy        REVIEW OF SYSTEMS  RESPIRATORY: No cough, wheezing,   CARDIOVASCULAR: No leg swelling  GASTROINTESTINAL: No melena or hematochezia    ALLERGIES:  penicillin (Unknown)    MEDICATIONS:  STANDING MEDICATIONS  amLODIPine   Tablet 10 milliGRAM(s) Oral daily  ascorbic acid 500 milliGRAM(s) Oral daily  aspirin enteric coated 81 milliGRAM(s) Oral daily  atorvastatin 80 milliGRAM(s) Oral at bedtime  cefepime   IVPB 1000 milliGRAM(s) IV Intermittent every 24 hours  chlorhexidine 4% Liquid 1 Application(s) Topical <User Schedule>  collagenase Ointment 1 Application(s) Topical two times a day  Dakins Solution - 1/2 Strength 1 Application(s) Topical two times a day  dextrose 5%. 1000 milliLiter(s) IV Continuous <Continuous>  docusate sodium Liquid 100 milliGRAM(s) Oral daily  memantine 10 milliGRAM(s) Oral daily  metroNIDAZOLE  IVPB 500 milliGRAM(s) IV Intermittent every 8 hours  multivitamin 1 Tablet(s) Oral daily  vancomycin  IVPB 1000 milliGRAM(s) IV Intermittent every 12 hours  zinc sulfate 220 milliGRAM(s) Oral daily    PRN MEDICATIONS    VITALS:   T(F): 97.2  HR: 89  BP: 134/64  RR: 18  SpO2: --    PHYSICAL EXAM:  General: No acute distress.   nonfocal. elderly cachetic female.       PULM: Clear to auscultation bilaterally, no significant sputum production.    CVS: Regular rate and rhythm, no murmurs, rubs, or gallops.    ABD: Soft, nondistended, nontender, no masses.    EXT: No edema, nontender.    SKIN: Warm and well perfused, no rashes noted. Numerous decubitus ulcer with skin brake down.       LABS:                        11.1   7.70  )-----------( 221      ( 16 Jan 2019 06:46 )             35.2     01-16    139  |  102  |  11  ----------------------------<  188<H>  3.8   |  23  |  1.1    Ca    8.1<L>      16 Jan 2019 06:46  Phos  2.2     01-16  Mg     2.1     01-16      PT/INR - ( 16 Jan 2019 06:46 )   PT: 15.60 sec;   INR: 1.36 ratio         PTT - ( 16 Jan 2019 06:46 )  PTT:49.2 sec              RADIOLOGY:
LENGTH OF HOSPITAL STAY: 8d      CHIEF COMPLAINT:   Patient is a 84y old  Female who presents with a chief complaint of Dec PO intake (16 Jan 2019 15:38)      OVER Past 24hrs:  The patient was seen and examined at bedside there were    HISTORY OF PRESENTING ILLNESS:    HPI:  84 yof with pmh of severe dementia, bedbound, nonverbal, htn, dld, pressure ulcers, OA was brought in by family due to decrease po intake and c/o drainage with foul smell and tissue darkening around left decubitus ulcer. No fever, chills, SOB, palpitations, headache, dizziness.   she usually eats soft food with assistance and makes moaning sounds,from home; dependent on all ADLs since 4 years; lives with family at home;       Underwent debridement by Dr. Gimenez on 12/25.. Has been receiving good wound care with Santyl and VNS following. vitals:137/74, 101, 98.9F, 17, 99% on room air,  Labs no inc WBC, sodium of 149. received cipro,clinda, 1 litre bolus in ED (10 Carlos 2019 01:34)    PAST MEDICAL & SURGICAL HISTORY  PAST MEDICAL & SURGICAL HISTORY:  Bedsore  Pressure ulcer  Bedbound  Nonverbal  Arthritis  High cholesterol  Dementia  HTN (hypertension)  S/P appendectomy            ALLERGIES:  penicillin (Unknown)    MEDICATIONS:  STANDING MEDICATIONS  amLODIPine   Tablet 10 milliGRAM(s) Oral daily  ascorbic acid 500 milliGRAM(s) Oral daily  aspirin enteric coated 81 milliGRAM(s) Oral daily  atorvastatin 80 milliGRAM(s) Oral at bedtime  cefepime   IVPB 1000 milliGRAM(s) IV Intermittent every 24 hours  chlorhexidine 4% Liquid 1 Application(s) Topical <User Schedule>  collagenase Ointment 1 Application(s) Topical two times a day  Dakins Solution - 1/2 Strength 1 Application(s) Topical two times a day  docusate sodium Liquid 100 milliGRAM(s) Oral daily  memantine 10 milliGRAM(s) Oral daily  metroNIDAZOLE  IVPB 500 milliGRAM(s) IV Intermittent every 8 hours  multivitamin 1 Tablet(s) Oral daily  thiamine 100 milliGRAM(s) Oral daily  vancomycin  IVPB 1000 milliGRAM(s) IV Intermittent every 12 hours  zinc sulfate 220 milliGRAM(s) Oral daily    PRN MEDICATIONS    VITALS:   T(F): 97  HR: 88  BP: 118/72  RR: 18  SpO2: 96%    PHYSICAL EXAM:  General: No acute distress.   nonfocal. elderly cachetic female.       PULM: Clear to auscultation bilaterally, no significant sputum production.    CVS: Regular rate and rhythm, no murmurs, rubs, or gallops.    ABD: Soft, nondistended, nontender, no masses.    EXT: No edema, nontender.    SKIN: Warm and well perfused, no rashes noted. Numerous decubitus ulcer with skin brake down.     LABS:                        10.6   7.74  )-----------( 248      ( 17 Jan 2019 05:31 )             33.9     01-17    143  |  104  |  8<L>  ----------------------------<  131<H>  3.5   |  22  |  1.0    Ca    8.2<L>      17 Jan 2019 05:31  Phos  2.3     01-17  Mg     2.4     01-17      PT/INR - ( 16 Jan 2019 06:46 )   PT: 15.60 sec;   INR: 1.36 ratio         PTT - ( 16 Jan 2019 06:46 )  PTT:49.2 sec          Culture - Surgical Swab (collected 15 Carlos 2019 07:41)  Source: .Surgical Swab None  Preliminary Report (16 Jan 2019 11:43):    Numerous Enterococcus faecalis    Culture - Surgical Swab (collected 15 Carlos 2019 07:41)  Source: .Surgical Swab None  Preliminary Report (16 Jan 2019 11:43):    Few Enterococcus faecalis
LENGTH OF HOSPITAL STAY: 9d      CHIEF COMPLAINT:   Patient is a 84y old  Female who presents with a chief complaint of Dec PO intake (18 Jan 2019 09:37)      OVER Past 24hrs:  The patient was seen and examined at bedside there were no acute events during the night patient is tolerating feeds. S    PAST MEDICAL & SURGICAL HISTORY  PAST MEDICAL & SURGICAL HISTORY:  Bedsore  Pressure ulcer  Bedbound  Nonverbal  Arthritis  High cholesterol  Dementia  HTN (hypertension)  S/P appendectomy        REVIEW OF SYSTEMS  CONSTITUTIONAL: No fever, weight loss, or fatigue  EYES: No eye pain, visual disturbances, or discharge  ENMT:  No difficulty hearing, tinnitus, vertigo; No sinus or throat pain  NECK: No pain or stiffness  BREASTS: No pain, masses, or nipple discharge  RESPIRATORY: No cough, wheezing, chills or hemoptysis; No shortness of breath  CARDIOVASCULAR: No chest pain, palpitations, dizziness, or leg swelling  GASTROINTESTINAL: No abdominal or epigastric pain. No nausea, vomiting, or hematemesis; No diarrhea or constipation. No melena or hematochezia.  GENITOURINARY: No dysuria, frequency, hematuria, or incontinence  NEUROLOGICAL: No headaches, memory loss, loss of strength, numbness, or tremors  SKIN: No itching, burning, rashes, or lesions   LYMPH NODES: No enlarged glands  ENDOCRINE: No heat or cold intolerance; No hair loss  MUSCULOSKELETAL: No joint pain or swelling; No muscle, back, or extremity pain  PSYCHIATRIC: No depression, anxiety, mood swings, or difficulty sleeping  HEME/LYMPH: No easy bruising, or bleeding gums  ALLERY AND IMMUNOLOGIC: No hives or eczema    ALLERGIES:  penicillin (Unknown)    MEDICATIONS:  STANDING MEDICATIONS  amLODIPine   Tablet 10 milliGRAM(s) Oral daily  ascorbic acid 500 milliGRAM(s) Oral daily  aspirin  chewable 81 milliGRAM(s) Oral daily  atorvastatin 80 milliGRAM(s) Oral at bedtime  chlorhexidine 4% Liquid 1 Application(s) Topical <User Schedule>  collagenase Ointment 1 Application(s) Topical two times a day  Dakins Solution - 1/2 Strength 1 Application(s) Topical two times a day  docusate sodium Liquid 100 milliGRAM(s) Oral daily  levoFLOXacin  Tablet 500 milliGRAM(s) Oral every 24 hours  linezolid    Tablet 600 milliGRAM(s) Oral every 12 hours  memantine 10 milliGRAM(s) Oral daily  metroNIDAZOLE    Tablet 500 milliGRAM(s) Oral every 8 hours  multivitamin 1 Tablet(s) Oral daily  thiamine 100 milliGRAM(s) Oral daily  zinc sulfate 220 milliGRAM(s) Oral daily    PRN MEDICATIONS    VITALS:   T(F): 97.6  HR: 94  BP: 120/56  RR: 16  SpO2: --    PHYSICAL EXAM:  General: No acute distress.  Alert, oriented, interactive, nonfocal.    HEENT: Pupils equal, reactive to light symmetrically.    PULM: Clear to auscultation bilaterally, no significant sputum production.    CVS: Regular rate and rhythm, no murmurs, rubs, or gallops.    ABD: Soft, nondistended, nontender, no masses.    EXT: No edema, nontender.    SKIN: Warm and well perfused, no rashes noted.    LABS:                        10.6   7.74  )-----------( 248      ( 17 Jan 2019 05:31 )             33.9     01-17    143  |  104  |  8<L>  ----------------------------<  131<H>  3.5   |  22  |  1.0    Ca    8.2<L>      17 Jan 2019 05:31  Phos  2.3     01-17  Mg     2.4     01-17                    RADIOLOGY:
JUAQUIN TEIXEIRA  84y, Female      OVERNIGHT EVENTS:    none    VITALS:  T(F): 97, Max: 97.7 (01-16-19 @ 20:56)  HR: 88  BP: 118/72  RR: 18Vital Signs Last 24 Hrs  T(C): 36.1 (17 Jan 2019 04:52), Max: 36.5 (16 Jan 2019 20:56)  T(F): 97 (17 Jan 2019 04:52), Max: 97.7 (16 Jan 2019 20:56)  HR: 88 (17 Jan 2019 04:52) (81 - 89)  BP: 118/72 (17 Jan 2019 04:52) (116/69 - 134/64)  BP(mean): --  RR: 18 (17 Jan 2019 04:52) (16 - 19)  SpO2: 96% (16 Jan 2019 20:01) (96% - 99%)    TESTS & MEASUREMENTS:                        10.6   7.74  )-----------( 248      ( 17 Jan 2019 05:31 )             33.9     01-17    143  |  104  |  8<L>  ----------------------------<  131<H>  3.5   |  22  |  1.0    Ca    8.2<L>      17 Jan 2019 05:31  Phos  2.3     01-17  Mg     2.4     01-17          Culture - Surgical Swab (collected 01-15-19 @ 07:41)  Source: .Surgical Swab None  Preliminary Report (01-16-19 @ 11:43):    Numerous Enterococcus faecalis    Culture - Surgical Swab (collected 01-15-19 @ 07:41)  Source: .Surgical Swab None  Preliminary Report (01-16-19 @ 11:43):    Few Enterococcus faecalis            RADIOLOGY & ADDITIONAL TESTS:    ANTIBIOTICS:  cefepime   IVPB 1000 milliGRAM(s) IV Intermittent every 24 hours  metroNIDAZOLE  IVPB 500 milliGRAM(s) IV Intermittent every 8 hours  vancomycin  IVPB 1000 milliGRAM(s) IV Intermittent every 12 hours

## 2019-01-18 NOTE — PROGRESS NOTE ADULT - ASSESSMENT
84 yof with pmh of severe dementia, bedbound, nonverbal, htn, dld, pressure ulcers, OA presented with failure to thrive, malnutrition, infected bilateral IT stage IV ulcers, a sacral DTI POA, heel ulcer POA, and medial knee skin breakdown poa. Ulcers were debrided and treated with IV abx. Patient was set up with PEG access for feedings for her malnutrition secondary to advanced dementia. She was set up with home services including PEG feedings, home medical equipment home wound-care Patient is hemodynamically stable with no systemic signs of infection and stable for discharge today.     Wound Care wound dressing as follows:  Right and left Trochanter Ulcer: please use Santyl and packing  2-3 daily  DTI sacrum: Xeroform and deshaun  times daily  DTI Right heeel: Foam Pads   Stage 3 Three ulcer inner left Knee: Xeroform and Kerlix 2-3x daily       FINAL DISCHARGE INTERVIEW:  Resident(s) Present: (Name:____Laureen Sandoval_________), RN Present: (Name:  __Jaky Campbell (RN) _________)    DISCHARGE MEDICATION RECONCILIATION  reviewed with Attending (Name:__Dr. Sosa_________)    DISPOSITION:   [  ] Home,    [ X ] Home with Visiting Nursing Services,   [    ]  SNF/ NH,    [   ] Acute Rehab (4A),   [   ] Other (Specify:_________)

## 2019-01-18 NOTE — PROGRESS NOTE ADULT - RESPIRATORY
Breath Sounds equal & clear to percussion & auscultation, no accessory muscle use
Breath Sounds equal & clear to percussion & auscultation, no accessory muscle use
detailed exam
Breath Sounds equal & clear to percussion & auscultation, no accessory muscle use
detailed exam

## 2019-01-22 ENCOUNTER — INPATIENT (INPATIENT)
Facility: HOSPITAL | Age: 84
LOS: 6 days | Discharge: HOSPICE | End: 2019-01-29
Attending: INTERNAL MEDICINE | Admitting: INTERNAL MEDICINE

## 2019-01-22 VITALS
OXYGEN SATURATION: 92 % | DIASTOLIC BLOOD PRESSURE: 60 MMHG | HEART RATE: 96 BPM | RESPIRATION RATE: 20 BRPM | SYSTOLIC BLOOD PRESSURE: 125 MMHG

## 2019-01-22 DIAGNOSIS — Z90.49 ACQUIRED ABSENCE OF OTHER SPECIFIED PARTS OF DIGESTIVE TRACT: Chronic | ICD-10-CM

## 2019-01-22 DIAGNOSIS — Z98.890 OTHER SPECIFIED POSTPROCEDURAL STATES: Chronic | ICD-10-CM

## 2019-01-22 LAB
ALBUMIN SERPL ELPH-MCNC: 2.5 G/DL — LOW (ref 3.5–5.2)
ALP SERPL-CCNC: 67 U/L — SIGNIFICANT CHANGE UP (ref 30–115)
ALT FLD-CCNC: 15 U/L — SIGNIFICANT CHANGE UP (ref 0–41)
ANION GAP SERPL CALC-SCNC: 14 MMOL/L — SIGNIFICANT CHANGE UP (ref 7–14)
APPEARANCE UR: ABNORMAL
APTT BLD: 25.3 SEC — LOW (ref 27–39.2)
AST SERPL-CCNC: 18 U/L — SIGNIFICANT CHANGE UP (ref 0–41)
BASOPHILS # BLD AUTO: 0.03 K/UL — SIGNIFICANT CHANGE UP (ref 0–0.2)
BASOPHILS NFR BLD AUTO: 0.2 % — SIGNIFICANT CHANGE UP (ref 0–1)
BILIRUB DIRECT SERPL-MCNC: <0.2 MG/DL — SIGNIFICANT CHANGE UP (ref 0–0.2)
BILIRUB INDIRECT FLD-MCNC: >0.1 MG/DL — LOW (ref 0.2–1.2)
BILIRUB SERPL-MCNC: 0.3 MG/DL — SIGNIFICANT CHANGE UP (ref 0.2–1.2)
BILIRUB UR-MCNC: NEGATIVE — SIGNIFICANT CHANGE UP
BLD GP AB SCN SERPL QL: SIGNIFICANT CHANGE UP
BUN SERPL-MCNC: 26 MG/DL — HIGH (ref 10–20)
CALCIUM SERPL-MCNC: 9.2 MG/DL — SIGNIFICANT CHANGE UP (ref 8.5–10.1)
CHLORIDE SERPL-SCNC: 98 MMOL/L — SIGNIFICANT CHANGE UP (ref 98–110)
CO2 SERPL-SCNC: 30 MMOL/L — SIGNIFICANT CHANGE UP (ref 17–32)
COLOR SPEC: SIGNIFICANT CHANGE UP
CREAT SERPL-MCNC: 1.1 MG/DL — SIGNIFICANT CHANGE UP (ref 0.7–1.5)
DIFF PNL FLD: ABNORMAL
EOSINOPHIL # BLD AUTO: 0.01 K/UL — SIGNIFICANT CHANGE UP (ref 0–0.7)
EOSINOPHIL NFR BLD AUTO: 0.1 % — SIGNIFICANT CHANGE UP (ref 0–8)
EPI CELLS # UR: ABNORMAL /HPF
GLUCOSE BLDC GLUCOMTR-MCNC: 216 MG/DL — HIGH (ref 70–99)
GLUCOSE SERPL-MCNC: 224 MG/DL — HIGH (ref 70–99)
GLUCOSE UR QL: 100 MG/DL
HCT VFR BLD CALC: 30.3 % — LOW (ref 37–47)
HGB BLD-MCNC: 9.5 G/DL — LOW (ref 12–16)
IMM GRANULOCYTES NFR BLD AUTO: 0.9 % — HIGH (ref 0.1–0.3)
INR BLD: 1.18 RATIO — SIGNIFICANT CHANGE UP (ref 0.65–1.3)
KETONES UR-MCNC: NEGATIVE — SIGNIFICANT CHANGE UP
LACTATE SERPL-SCNC: 2.5 MMOL/L — HIGH (ref 0.5–2.2)
LEUKOCYTE ESTERASE UR-ACNC: ABNORMAL
LIDOCAIN IGE QN: 19 U/L — SIGNIFICANT CHANGE UP (ref 7–60)
LYMPHOCYTES # BLD AUTO: 1.33 K/UL — SIGNIFICANT CHANGE UP (ref 1.2–3.4)
LYMPHOCYTES # BLD AUTO: 8.7 % — LOW (ref 20.5–51.1)
MCHC RBC-ENTMCNC: 24.6 PG — LOW (ref 27–31)
MCHC RBC-ENTMCNC: 31.4 G/DL — LOW (ref 32–37)
MCV RBC AUTO: 78.5 FL — LOW (ref 81–99)
MONOCYTES # BLD AUTO: 0.62 K/UL — HIGH (ref 0.1–0.6)
MONOCYTES NFR BLD AUTO: 4.1 % — SIGNIFICANT CHANGE UP (ref 1.7–9.3)
NEUTROPHILS # BLD AUTO: 13.1 K/UL — HIGH (ref 1.4–6.5)
NEUTROPHILS NFR BLD AUTO: 86 % — HIGH (ref 42.2–75.2)
NITRITE UR-MCNC: NEGATIVE — SIGNIFICANT CHANGE UP
NRBC # BLD: 0 /100 WBCS — SIGNIFICANT CHANGE UP (ref 0–0)
PH UR: 6 — SIGNIFICANT CHANGE UP (ref 5–8)
PLATELET # BLD AUTO: 269 K/UL — SIGNIFICANT CHANGE UP (ref 130–400)
POTASSIUM SERPL-MCNC: 3.6 MMOL/L — SIGNIFICANT CHANGE UP (ref 3.5–5)
POTASSIUM SERPL-SCNC: 3.6 MMOL/L — SIGNIFICANT CHANGE UP (ref 3.5–5)
PROT SERPL-MCNC: 6 G/DL — SIGNIFICANT CHANGE UP (ref 6–8)
PROT UR-MCNC: 100
PROTHROM AB SERPL-ACNC: 13.6 SEC — HIGH (ref 9.95–12.87)
RBC # BLD: 3.86 M/UL — LOW (ref 4.2–5.4)
RBC # FLD: 17.8 % — HIGH (ref 11.5–14.5)
SODIUM SERPL-SCNC: 142 MMOL/L — SIGNIFICANT CHANGE UP (ref 135–146)
SP GR SPEC: >=1.03 — SIGNIFICANT CHANGE UP (ref 1.01–1.03)
TYPE + AB SCN PNL BLD: SIGNIFICANT CHANGE UP
UROBILINOGEN FLD QL: 0.2 — SIGNIFICANT CHANGE UP (ref 0.2–0.2)
WBC # BLD: 15.22 K/UL — HIGH (ref 4.8–10.8)
WBC # FLD AUTO: 15.22 K/UL — HIGH (ref 4.8–10.8)
WBC UR QL: SIGNIFICANT CHANGE UP /HPF

## 2019-01-22 RX ORDER — CEFEPIME 1 G/1
1000 INJECTION, POWDER, FOR SOLUTION INTRAMUSCULAR; INTRAVENOUS ONCE
Qty: 0 | Refills: 0 | Status: COMPLETED | OUTPATIENT
Start: 2019-01-22 | End: 2019-01-22

## 2019-01-22 RX ORDER — SODIUM CHLORIDE 9 MG/ML
1350 INJECTION INTRAMUSCULAR; INTRAVENOUS; SUBCUTANEOUS ONCE
Qty: 0 | Refills: 0 | Status: COMPLETED | OUTPATIENT
Start: 2019-01-22 | End: 2019-01-22

## 2019-01-22 RX ORDER — ACETAMINOPHEN 500 MG
650 TABLET ORAL ONCE
Qty: 0 | Refills: 0 | Status: COMPLETED | OUTPATIENT
Start: 2019-01-22 | End: 2019-01-22

## 2019-01-22 RX ORDER — PANTOPRAZOLE SODIUM 20 MG/1
80 TABLET, DELAYED RELEASE ORAL ONCE
Qty: 0 | Refills: 0 | Status: COMPLETED | OUTPATIENT
Start: 2019-01-22 | End: 2019-01-22

## 2019-01-22 RX ADMIN — Medication 650 MILLIGRAM(S): at 23:34

## 2019-01-22 RX ADMIN — PANTOPRAZOLE SODIUM 80 MILLIGRAM(S): 20 TABLET, DELAYED RELEASE ORAL at 20:45

## 2019-01-22 RX ADMIN — SODIUM CHLORIDE 1000 MILLILITER(S): 9 INJECTION INTRAMUSCULAR; INTRAVENOUS; SUBCUTANEOUS at 22:30

## 2019-01-22 RX ADMIN — Medication 650 MILLIGRAM(S): at 21:45

## 2019-01-22 RX ADMIN — CEFEPIME 100 MILLIGRAM(S): 1 INJECTION, POWDER, FOR SOLUTION INTRAMUSCULAR; INTRAVENOUS at 23:04

## 2019-01-22 RX ADMIN — CEFEPIME 1000 MILLIGRAM(S): 1 INJECTION, POWDER, FOR SOLUTION INTRAMUSCULAR; INTRAVENOUS at 23:34

## 2019-01-22 NOTE — ED PROVIDER NOTE - OBJECTIVE STATEMENT
85 y/o F with PMH severe dementia, bedbound, pressure ulcers, nonverbal, HTN, HLD, DNR/DNI, recent admission for failure to thrive/debridement of ulcers/PEG tube placement 1 wk ago presents with coffee ground debris in PEG tube, and dark coffee ground debris oozing out of mouth x 1 episode today. on asa. no other blood thinners. denies change in activity, diarrhea, dark stools, trauma, fall.

## 2019-01-22 NOTE — ED PROVIDER NOTE - CARE PLAN
Principal Discharge DX:	GI bleed  Secondary Diagnosis:	Leukocytosis  Secondary Diagnosis:	Fever  Secondary Diagnosis:	PEG (percutaneous endoscopic gastrostomy) status Principal Discharge DX:	GI bleed  Secondary Diagnosis:	Leukocytosis  Secondary Diagnosis:	Fever  Secondary Diagnosis:	PEG (percutaneous endoscopic gastrostomy) status  Secondary Diagnosis:	Severe dementia  Secondary Diagnosis:	Bedbound

## 2019-01-22 NOTE — ED ADULT NURSE NOTE - NSIMPLEMENTINTERV_GEN_ALL_ED
Implemented All Fall with Harm Risk Interventions:  [place] to call system. Call bell, personal items and telephone within reach. Instruct patient to call for assistance. Room bathroom lighting operational. Non-slip footwear when patient is off stretcher. Physically safe environment: no spills, clutter or unnecessary equipment. Stretcher in lowest position, wheels locked, appropriate side rails in place. Provide visual cue, wrist band, yellow gown, etc. Monitor gait and stability. Monitor for mental status changes and reorient to person, place, and time. Review medications for side effects contributing to fall risk. Reinforce activity limits and safety measures with patient and family. Provide visual clues: red socks.

## 2019-01-22 NOTE — ED ADULT NURSE NOTE - CHPI ED NUR SYMPTOMS NEG
no decreased eating/drinking/no vomiting/no weakness/no tingling/no dizziness/no chills/no fever/no pain

## 2019-01-22 NOTE — ED ADULT NURSE REASSESSMENT NOTE - NS ED NURSE REASSESS COMMENT FT1
andreea at 715pm - at baseline per family, non verbal, contracted, decubiutus ulcers as noted, iv placed, bloods drawn and sent, cathed for cloudy yellow urine, place don cm - tylenol pr given, family at bedisde

## 2019-01-22 NOTE — ED PROVIDER NOTE - ATTENDING CONTRIBUTION TO CARE
I personally evaluated the patient. I reviewed the Resident’s or Physician Assistant’s note (as assigned above), and agree with the findings and plan except as documented in my note.  83 yo woman with coffee grounds from brand new PEG.  Also fever noted.   Hgb now 9.5.  Baseline around 11.  PPI given.  Spesis workup started.  Patient is DNR/DNi.  I spoke to family at length.  Likely will need palliative vs. hospice consideration.  For now admission for workup.

## 2019-01-22 NOTE — ED PROVIDER NOTE - PHYSICAL EXAMINATION
PHYSICAL EXAM:    GENERAL: Alert, chronically ill appearing, non-toxic  SKIN: Warm, pink and dry.   EYE: Normal lids/conjunctiva  ENT: Normal hearing, patent oropharynx  NECK: +supple. No meningismus  Pulm: Bilateral BS, normal resp effort, no wheezes, stridor, or retractions  CV: RRR, no M/R/G, 2+and = radial pulses  Abd: soft, non-tender, non-distended  Mskel: no erythema, cyanosis, edema. no calf tenderness  Neuro: awake cognitively impaired

## 2019-01-22 NOTE — ED PROVIDER NOTE - MEDICAL DECISION MAKING DETAILS
Elderly female with severe progressive dementia leaving her bedbound, with hx of pressure ulcers that recently underwent debridement presents to ER because family noticed coffee grounds from the brand new PEG tube. Pt also had fever up to 101 F. PT noted to have eelvated WBC and HG of 9.5, lactate 2.5. Given 30cc/kg bolus of fluids, given abx, and to get serial crits with serial abdomen exam (currently rebound or rigid abdomen, +BS). Will admit for ivf's, iv abx, iv PPI, possible PRBC if significant drop and likely will need palliative care consultation. Family aware. To admit to inpatient [place] for above.

## 2019-01-22 NOTE — ED PROVIDER NOTE - PROGRESS NOTE DETAILS
endorsed to my by dr shafer attempted to call daughter in law aracelis x 2 who was at bedside earlier to discuss dispo. no answer. will admit for palliative care/further management. discussed with MAR. paged hospitalist. attempted to call daughter in law aracelis x 2 ([phone]) who was at bedside earlier to discuss dispo. no answer. will admit for palliative care/further management. discussed with MAR. paged hospitalist.

## 2019-01-22 NOTE — ED ADULT NURSE NOTE - NS PRO PASSIVE SMOKE EXP
pt wants to stop taking the gabepentin. Pt states she's been doing better & wants to be taken off.  Please call @[phone] Unknown

## 2019-01-22 NOTE — ED ADULT NURSE NOTE - OBJECTIVE STATEMENT
The patient is a 84y female brought in by family for coffee ground emesis x1. Patient had peg tube placed last Thursday and has been receiving daily feeds but The patient is a 84y female brought in by family for coffee ground emesis x1. Patient had peg tube placed last Thursday and has been receiving daily feeds but noticed dark brown fluid in peg tube. Patient is non verbal at baseline with history of dementia. Patient is also contracted with multiple pressure ulcers. The patient is a 84y female brought in by family for coffee ground emesis x1. Patient had peg tube placed last Thursday and has been receiving daily feeds but noticed dark brown fluid in peg tube. Patient is non verbal at baseline with history of dementia. Patient is also contracted with multiple pressure ulcers. Family denies any recent fever at home or respiratory distress.

## 2019-01-22 NOTE — ED ADULT TRIAGE NOTE - CHIEF COMPLAINT QUOTE
patient noted to have coffee ground emesis in mouth and in g tube patient recent dc from hospital for FTT

## 2019-01-23 LAB
ANION GAP SERPL CALC-SCNC: 15 MMOL/L — HIGH (ref 7–14)
BASE EXCESS BLDV CALC-SCNC: 5.7 MMOL/L — HIGH (ref -2–2)
BASOPHILS # BLD AUTO: 0 K/UL — SIGNIFICANT CHANGE UP (ref 0–0.2)
BASOPHILS # BLD AUTO: 0.03 K/UL — SIGNIFICANT CHANGE UP (ref 0–0.2)
BASOPHILS NFR BLD AUTO: 0 % — SIGNIFICANT CHANGE UP (ref 0–1)
BASOPHILS NFR BLD AUTO: 0.2 % — SIGNIFICANT CHANGE UP (ref 0–1)
BUN SERPL-MCNC: 17 MG/DL — SIGNIFICANT CHANGE UP (ref 10–20)
CA-I SERPL-SCNC: 1.21 MMOL/L — SIGNIFICANT CHANGE UP (ref 1.12–1.3)
CALCIUM SERPL-MCNC: 7.9 MG/DL — LOW (ref 8.5–10.1)
CHLORIDE SERPL-SCNC: 105 MMOL/L — SIGNIFICANT CHANGE UP (ref 98–110)
CO2 SERPL-SCNC: 25 MMOL/L — SIGNIFICANT CHANGE UP (ref 17–32)
CREAT SERPL-MCNC: 1 MG/DL — SIGNIFICANT CHANGE UP (ref 0.7–1.5)
CULTURE RESULTS: NO GROWTH — SIGNIFICANT CHANGE UP
EOSINOPHIL # BLD AUTO: 0.02 K/UL — SIGNIFICANT CHANGE UP (ref 0–0.7)
EOSINOPHIL # BLD AUTO: 0.11 K/UL — SIGNIFICANT CHANGE UP (ref 0–0.7)
EOSINOPHIL NFR BLD AUTO: 0.2 % — SIGNIFICANT CHANGE UP (ref 0–8)
EOSINOPHIL NFR BLD AUTO: 0.9 % — SIGNIFICANT CHANGE UP (ref 0–8)
GAS PNL BLDV: 142 MMOL/L — SIGNIFICANT CHANGE UP (ref 136–145)
GAS PNL BLDV: SIGNIFICANT CHANGE UP
GIANT PLATELETS BLD QL SMEAR: PRESENT — SIGNIFICANT CHANGE UP
GLUCOSE SERPL-MCNC: 300 MG/DL — HIGH (ref 70–99)
HCO3 BLDV-SCNC: 29 MMOL/L — SIGNIFICANT CHANGE UP (ref 22–29)
HCT VFR BLD CALC: 29.1 % — LOW (ref 37–47)
HCT VFR BLD CALC: 29.6 % — LOW (ref 37–47)
HCT VFR BLDA CALC: 26.9 % — LOW (ref 34–44)
HGB BLD CALC-MCNC: 8.8 G/DL — LOW (ref 14–18)
HGB BLD-MCNC: 9.1 G/DL — LOW (ref 12–16)
HGB BLD-MCNC: 9.3 G/DL — LOW (ref 12–16)
HYPOCHROMIA BLD QL: SLIGHT — SIGNIFICANT CHANGE UP
IMM GRANULOCYTES NFR BLD AUTO: 0.8 % — HIGH (ref 0.1–0.3)
LACTATE BLDV-MCNC: 2 MMOL/L — HIGH (ref 0.5–1.6)
LYMPHOCYTES # BLD AUTO: 1.06 K/UL — LOW (ref 1.2–3.4)
LYMPHOCYTES # BLD AUTO: 1.26 K/UL — SIGNIFICANT CHANGE UP (ref 1.2–3.4)
LYMPHOCYTES # BLD AUTO: 8.7 % — LOW (ref 20.5–51.1)
LYMPHOCYTES # BLD AUTO: 9.5 % — LOW (ref 20.5–51.1)
MAGNESIUM SERPL-MCNC: 1.8 MG/DL — SIGNIFICANT CHANGE UP (ref 1.8–2.4)
MANUAL SMEAR VERIFICATION: SIGNIFICANT CHANGE UP
MCHC RBC-ENTMCNC: 24.7 PG — LOW (ref 27–31)
MCHC RBC-ENTMCNC: 24.9 PG — LOW (ref 27–31)
MCHC RBC-ENTMCNC: 31.3 G/DL — LOW (ref 32–37)
MCHC RBC-ENTMCNC: 31.4 G/DL — LOW (ref 32–37)
MCV RBC AUTO: 79.1 FL — LOW (ref 81–99)
MCV RBC AUTO: 79.1 FL — LOW (ref 81–99)
MONOCYTES # BLD AUTO: 0.32 K/UL — SIGNIFICANT CHANGE UP (ref 0.1–0.6)
MONOCYTES # BLD AUTO: 0.57 K/UL — SIGNIFICANT CHANGE UP (ref 0.1–0.6)
MONOCYTES NFR BLD AUTO: 2.6 % — SIGNIFICANT CHANGE UP (ref 1.7–9.3)
MONOCYTES NFR BLD AUTO: 4.3 % — SIGNIFICANT CHANGE UP (ref 1.7–9.3)
NEUTROPHILS # BLD AUTO: 10.58 K/UL — HIGH (ref 1.4–6.5)
NEUTROPHILS # BLD AUTO: 11.22 K/UL — HIGH (ref 1.4–6.5)
NEUTROPHILS NFR BLD AUTO: 85 % — HIGH (ref 42.2–75.2)
NEUTROPHILS NFR BLD AUTO: 86.9 % — HIGH (ref 42.2–75.2)
NRBC # BLD: 0 /100 WBCS — SIGNIFICANT CHANGE UP (ref 0–0)
NRBC # BLD: 0 /100 WBCS — SIGNIFICANT CHANGE UP (ref 0–0)
PCO2 BLDV: 38 MMHG — LOW (ref 41–51)
PH BLDV: 7.5 — HIGH (ref 7.26–7.43)
PLAT MORPH BLD: NORMAL — SIGNIFICANT CHANGE UP
PLATELET # BLD AUTO: 251 K/UL — SIGNIFICANT CHANGE UP (ref 130–400)
PLATELET # BLD AUTO: 273 K/UL — SIGNIFICANT CHANGE UP (ref 130–400)
PO2 BLDV: 64 MMHG — HIGH (ref 20–40)
POTASSIUM BLDV-SCNC: 3.2 MMOL/L — LOW (ref 3.3–5.6)
POTASSIUM SERPL-MCNC: 3.2 MMOL/L — LOW (ref 3.5–5)
POTASSIUM SERPL-SCNC: 3.2 MMOL/L — LOW (ref 3.5–5)
RBC # BLD: 3.68 M/UL — LOW (ref 4.2–5.4)
RBC # BLD: 3.74 M/UL — LOW (ref 4.2–5.4)
RBC # FLD: 17.9 % — HIGH (ref 11.5–14.5)
RBC # FLD: 18 % — HIGH (ref 11.5–14.5)
RBC BLD AUTO: ABNORMAL
SAO2 % BLDV: 94 % — SIGNIFICANT CHANGE UP
SODIUM SERPL-SCNC: 145 MMOL/L — SIGNIFICANT CHANGE UP (ref 135–146)
SPECIMEN SOURCE: SIGNIFICANT CHANGE UP
VARIANT LYMPHS # BLD: 0.9 % — SIGNIFICANT CHANGE UP (ref 0–5)
WBC # BLD: 12.18 K/UL — HIGH (ref 4.8–10.8)
WBC # BLD: 13.21 K/UL — HIGH (ref 4.8–10.8)
WBC # FLD AUTO: 12.18 K/UL — HIGH (ref 4.8–10.8)
WBC # FLD AUTO: 13.21 K/UL — HIGH (ref 4.8–10.8)

## 2019-01-23 RX ORDER — METRONIDAZOLE 500 MG
500 TABLET ORAL EVERY 8 HOURS
Qty: 0 | Refills: 0 | Status: DISCONTINUED | OUTPATIENT
Start: 2019-01-23 | End: 2019-01-26

## 2019-01-23 RX ORDER — PANTOPRAZOLE SODIUM 20 MG/1
40 TABLET, DELAYED RELEASE ORAL
Qty: 0 | Refills: 0 | Status: DISCONTINUED | OUTPATIENT
Start: 2019-01-23 | End: 2019-01-29

## 2019-01-23 RX ORDER — PANTOPRAZOLE SODIUM 20 MG/1
40 TABLET, DELAYED RELEASE ORAL DAILY
Qty: 0 | Refills: 0 | Status: DISCONTINUED | OUTPATIENT
Start: 2019-01-23 | End: 2019-01-23

## 2019-01-23 RX ORDER — CEFEPIME 1 G/1
1000 INJECTION, POWDER, FOR SOLUTION INTRAMUSCULAR; INTRAVENOUS EVERY 12 HOURS
Qty: 0 | Refills: 0 | Status: DISCONTINUED | OUTPATIENT
Start: 2019-01-23 | End: 2019-01-26

## 2019-01-23 RX ORDER — ATORVASTATIN CALCIUM 80 MG/1
20 TABLET, FILM COATED ORAL AT BEDTIME
Qty: 0 | Refills: 0 | Status: DISCONTINUED | OUTPATIENT
Start: 2019-01-23 | End: 2019-01-24

## 2019-01-23 RX ORDER — SODIUM CHLORIDE 9 MG/ML
1000 INJECTION, SOLUTION INTRAVENOUS
Qty: 0 | Refills: 0 | Status: DISCONTINUED | OUTPATIENT
Start: 2019-01-23 | End: 2019-01-25

## 2019-01-23 RX ORDER — VANCOMYCIN HCL 1 G
500 VIAL (EA) INTRAVENOUS EVERY 12 HOURS
Qty: 0 | Refills: 0 | Status: DISCONTINUED | OUTPATIENT
Start: 2019-01-23 | End: 2019-01-26

## 2019-01-23 RX ORDER — HEPARIN SODIUM 5000 [USP'U]/ML
5000 INJECTION INTRAVENOUS; SUBCUTANEOUS EVERY 12 HOURS
Qty: 0 | Refills: 0 | Status: DISCONTINUED | OUTPATIENT
Start: 2019-01-23 | End: 2019-01-23

## 2019-01-23 RX ORDER — ASCORBIC ACID 60 MG
500 TABLET,CHEWABLE ORAL DAILY
Qty: 0 | Refills: 0 | Status: DISCONTINUED | OUTPATIENT
Start: 2019-01-23 | End: 2019-01-24

## 2019-01-23 RX ORDER — AMLODIPINE BESYLATE 2.5 MG/1
10 TABLET ORAL DAILY
Qty: 0 | Refills: 0 | Status: DISCONTINUED | OUTPATIENT
Start: 2019-01-23 | End: 2019-01-24

## 2019-01-23 RX ORDER — MEMANTINE HYDROCHLORIDE 10 MG/1
10 TABLET ORAL DAILY
Qty: 0 | Refills: 0 | Status: DISCONTINUED | OUTPATIENT
Start: 2019-01-23 | End: 2019-01-24

## 2019-01-23 RX ORDER — CHLORHEXIDINE GLUCONATE 213 G/1000ML
1 SOLUTION TOPICAL
Qty: 0 | Refills: 0 | Status: DISCONTINUED | OUTPATIENT
Start: 2019-01-23 | End: 2019-01-29

## 2019-01-23 RX ORDER — DOCUSATE SODIUM 100 MG
100 CAPSULE ORAL
Qty: 0 | Refills: 0 | Status: DISCONTINUED | OUTPATIENT
Start: 2019-01-23 | End: 2019-01-24

## 2019-01-23 RX ADMIN — AMLODIPINE BESYLATE 10 MILLIGRAM(S): 2.5 TABLET ORAL at 06:46

## 2019-01-23 RX ADMIN — Medication 500 MILLIGRAM(S): at 14:41

## 2019-01-23 RX ADMIN — ATORVASTATIN CALCIUM 20 MILLIGRAM(S): 80 TABLET, FILM COATED ORAL at 21:13

## 2019-01-23 RX ADMIN — Medication 100 MILLIGRAM(S): at 16:51

## 2019-01-23 RX ADMIN — SODIUM CHLORIDE 1350 MILLILITER(S): 9 INJECTION INTRAMUSCULAR; INTRAVENOUS; SUBCUTANEOUS at 00:30

## 2019-01-23 RX ADMIN — Medication 500 MILLIGRAM(S): at 21:12

## 2019-01-23 RX ADMIN — PANTOPRAZOLE SODIUM 40 MILLIGRAM(S): 20 TABLET, DELAYED RELEASE ORAL at 16:52

## 2019-01-23 NOTE — CONSULT NOTE ADULT - ATTENDING COMMENTS
Pt seen, assesment and recs as above.  Family receptive to transition to Hospice care upon dc    No further Palliative Care interventions at this time  Pt appears comfortable    PLease re-consult PRN
case presented and issues discussed when pt initially seen  see 1/24 f/u note

## 2019-01-23 NOTE — CONSULT NOTE ADULT - ASSESSMENT
84yFemale being evaluated for goals of care. No symptoms noted at this time. Pt s/p PEG placement 1 week ago. Pt lives at home daughter-in law is care takerCame in for coffee ground emesis. H&H Stable. Pt currently resting and comfortable. IVF infusing. GI eval done, endoscopy pending. Pt previously DNR/DNI. No family at bedside. Called son Anthony and he answered, called to clarify advanced directives. He was interrupted by a call from the hospital. He asked me to call back.           Recommendations:   Previous DNR/DNI  Awaiting [facility] discussion of endoscopy  Pt chronically ill with end stage dementia  Hospice appropriate at this time 84yFemale being evaluated for goals of care. No symptoms noted at this time. Pt s/p PEG placement 1 week ago. Pt lives at home daughter-in law is care takerCame in for coffee ground emesis. H&H Stable. Pt currently resting and comfortable. IVF infusing. GI eval done, endoscopy pending. Pt previously DNR/DNI. No family at bedside. Called son Anthony and he answered, called to clarify advanced directives. He was interrupted by a call from the hospital. He asked me to call back.           Recommendations:   Previous DNR/DNI  Awaiting [facility] discussion of endoscopy  Pt chronically ill with end stage dementia  Hospice appropriate at this time

## 2019-01-23 NOTE — CONSULT NOTE ADULT - SUBJECTIVE AND OBJECTIVE BOX
GI HPI:  85 y/o Female with PMH severe dementia, bedbound, pressure ulcers, nonverbal, HTN, HLD, DNR/DNI, recent admission for failure to thrive/debridement of ulcers/PEG tube placement 1 week ago presents with coffee ground debris in PEG tube, and dark coffee ground debris oozing out of mouth.   Patient is on aspirin but no other blood thinners. Patient family denies change in activity, diarrhea, dark stools, trauma, fall  Rectal exam green stools     Previous EGD: 1 week ago showed 1 cm duodenal bulb ulcer, clean based   PAST MEDICAL & SURGICAL HISTORY  Bedsore  Pressure ulcer  Bedbound  Nonverbal  Arthritis  High cholesterol  Dementia  HTN (hypertension)  History of surgery: peg tube placement  S/P appendectomy        SOCIAL HISTORY:  smoker: non smoker  Alcohol: Non alcoholic  FAMILY HISTORY:  FAMILY HISTORY:  No pertinent family history in first degree relatives      ALLERGIES:  penicillin (Unknown)      MEDICATIONS:  MEDICATIONS  (STANDING):  amLODIPine   Tablet 10 milliGRAM(s) Oral daily  ascorbic acid 500 milliGRAM(s) Oral daily  atorvastatin 20 milliGRAM(s) Oral at bedtime  cefepime  Injectable. 1000 milliGRAM(s) IV Push every 12 hours  dextrose 5% + sodium chloride 0.9%. 1000 milliLiter(s) (75 mL/Hr) IV Continuous <Continuous>  docusate sodium 100 milliGRAM(s) Oral two times a day  heparin  Injectable 5000 Unit(s) SubCutaneous every 12 hours  memantine 10 milliGRAM(s) Oral daily  metroNIDAZOLE    Tablet 500 milliGRAM(s) Oral every 8 hours  pantoprazole   Suspension 40 milliGRAM(s) Oral <User Schedule>  vancomycin  IVPB 500 milliGRAM(s) IV Intermittent every 12 hours    MEDICATIONS  (PRN):      HOME MEDICATIONS:  Home Medications:  amLODIPine 10 mg oral tablet: 1 tab(s) orally once a day (24 Dec 2018 13:16)  Aspirin Enteric Coated 81 mg oral delayed release tablet: 1 tab(s) orally once a day (22 Dec 2018 03:07)  docusate sodium 100 mg oral tablet: 1 tab(s) orally 2 times a day (22 Dec 2018 03:58)  Exelon 4.6 mg/24 hr transdermal film, extended release: 1 patch transdermal once a day (22 Dec 2018 03:03)  memantine 10 mg oral tablet: 1 tab(s) orally once a day (22 Dec 2018 03:01)  rosuvastatin 20 mg oral tablet: 1 tab(s) orally once a day (at bedtime) (22 Dec 2018 03:01)      ROS:     REVIEW OF SYSTEMS  General:  No fevers  Eyes:  No reported pain   ENT:  No sore throat   NECK: No stiffness   CV:  No chest pain   Resp:  No shortness of breath  GI:  See HPI  :  No dysuria  Muscle:  No weakness  Neuro:  No tingling  Endocrine:  No polyuria  Heme:  No ecchymosis          VITALS:   T(F): 99.4 (01-23 @ 08:39), Max: 101.3 (01-22 @ 21:10)  HR: 91 (01-23 @ 08:39) (75 - 96)  BP: 121/56 (01-23 @ 08:39) (102/51 - 135/56)  BP(mean): --  RR: 18 (01-23 @ 08:39) (16 - 20)  SpO2: 97% (01-23 @ 08:39) (92% - 97%)    I&O's Summary    22 Jan 2019 07:01  -  23 Jan 2019 07:00  --------------------------------------------------------  IN: 0 mL / OUT: 400 mL / NET: -400 mL        PHYSICAL EXAM:  GENERAL:  Appears in no distress  HEENT:  Conjunctivae  anicteric  CHEST:  Full & symmetric excursion  HEART:  N S1, S2  ABDOMEN:  Soft, non-tender, non-distended, PEG tube flushing well, skin looks good, but tube  not moving   EXTREMITIES  no  edema  SKIN:  No rash  NEURO:  Alert  Rectal exam: Green stools       LABS:                        9.1    13.21 )-----------( 251      ( 23 Jan 2019 01:58 )             29.1     PT/INR - ( 22 Jan 2019 20:50 )  INR: 1.18          PTT - ( 22 Jan 2019 20:50 )  PTT:25.3<L>  LIVER FUNCTIONS - ( 22 Jan 2019 20:50 )  Alb: 2.5 g/dL / Pro: 6.0 g/dL / ALK PHOS: 67 U/L / ALT: 15 U/L / AST: 18 U/L / GGT: x           01-22    142  |  98  |  26<H>  ----------------------------<  224<H>  3.6   |  30  |  1.1    Ca    9.2      22 Jan 2019 20:50 GI HPI:  83 y/o Female with PMH severe dementia, bedbound, pressure ulcers, nonverbal, HTN, HLD, DNR/DNI, recent admission for failure to thrive/debridement of ulcers/PEG tube placement 1 week ago presents with coffee ground debris in PEG tube, and dark coffee ground debris oozing out of mouth.   Patient is on aspirin but no other blood thinners. Patient family denies change in activity, diarrhea, dark stools, trauma, fall  Rectal exam green stools     Previous EGD: 1 week ago showed 1 cm duodenal bulb ulcer, clean based   PAST MEDICAL & SURGICAL HISTORY  Bedsore  Pressure ulcer  Bedbound  Nonverbal  Arthritis  High cholesterol  Dementia  HTN (hypertension)  History of surgery: peg tube placement  S/P appendectomy        SOCIAL HISTORY:  smoker: non smoker  Alcohol: Non alcoholic  FAMILY HISTORY:  FAMILY HISTORY:  No pertinent family history in first degree relatives      ALLERGIES:  penicillin (Unknown)      MEDICATIONS:  MEDICATIONS  (STANDING):  amLODIPine   Tablet 10 milliGRAM(s) Oral daily  ascorbic acid 500 milliGRAM(s) Oral daily  atorvastatin 20 milliGRAM(s) Oral at bedtime  cefepime  Injectable. 1000 milliGRAM(s) IV Push every 12 hours  dextrose 5% + sodium chloride 0.9%. 1000 milliLiter(s) (75 mL/Hr) IV Continuous <Continuous>  docusate sodium 100 milliGRAM(s) Oral two times a day  heparin  Injectable 5000 Unit(s) SubCutaneous every 12 hours  memantine 10 milliGRAM(s) Oral daily  metroNIDAZOLE    Tablet 500 milliGRAM(s) Oral every 8 hours  pantoprazole   Suspension 40 milliGRAM(s) Oral <User Schedule>  vancomycin  IVPB 500 milliGRAM(s) IV Intermittent every 12 hours    MEDICATIONS  (PRN):      HOME MEDICATIONS:  Home Medications:  amLODIPine 10 mg oral tablet: 1 tab(s) orally once a day (24 Dec 2018 13:16)  Aspirin Enteric Coated 81 mg oral delayed release tablet: 1 tab(s) orally once a day (22 Dec 2018 03:07)  docusate sodium 100 mg oral tablet: 1 tab(s) orally 2 times a day (22 Dec 2018 03:58)  Exelon 4.6 mg/24 hr transdermal film, extended release: 1 patch transdermal once a day (22 Dec 2018 03:03)  memantine 10 mg oral tablet: 1 tab(s) orally once a day (22 Dec 2018 03:01)  rosuvastatin 20 mg oral tablet: 1 tab(s) orally once a day (at bedtime) (22 Dec 2018 03:01)      ROS:     unobtainable          VITALS:   T(F): 99.4 (01-23 @ 08:39), Max: 101.3 (01-22 @ 21:10)  HR: 91 (01-23 @ 08:39) (75 - 96)  BP: 121/56 (01-23 @ 08:39) (102/51 - 135/56)  BP(mean): --  RR: 18 (01-23 @ 08:39) (16 - 20)  SpO2: 97% (01-23 @ 08:39) (92% - 97%)    I&O's Summary    22 Jan 2019 07:01  -  23 Jan 2019 07:00  --------------------------------------------------------  IN: 0 mL / OUT: 400 mL / NET: -400 mL        PHYSICAL EXAM:  GENERAL:  Appears in no distress, contracted  HEENT:  Conjunctivae  anicteric  CHEST:  Full & symmetric excursion  HEART:  N S1, S2  ABDOMEN:  Soft, non-tender, non-distended, PEG tube flushing well, skin looks good, but tube  not moving   EXTREMITIES  no  edema  SKIN:  No rash  NEURO:  Alert  Rectal exam: Green stools       LABS:                        9.1    13.21 )-----------( 251      ( 23 Jan 2019 01:58 )             29.1     PT/INR - ( 22 Jan 2019 20:50 )  INR: 1.18          PTT - ( 22 Jan 2019 20:50 )  PTT:25.3<L>  LIVER FUNCTIONS - ( 22 Jan 2019 20:50 )  Alb: 2.5 g/dL / Pro: 6.0 g/dL / ALK PHOS: 67 U/L / ALT: 15 U/L / AST: 18 U/L / GGT: x           01-22    142  |  98  |  26<H>  ----------------------------<  224<H>  3.6   |  30  |  1.1    Ca    9.2      22 Jan 2019 20:50

## 2019-01-23 NOTE — CONSULT NOTE ADULT - REASON FOR ADMISSION
Fever, coffee ground substance from peg

## 2019-01-23 NOTE — PRE-ANESTHESIA EVALUATION ADULT - NSANTHADDINFOFT_GEN_ALL_CORE
85 y/o WF evaluated for EGD.  ASA 3.  Plan IV sedation / GA backup.  Plan, benefits, foreseeable risks, viable alternatives discussed with patient's son and all his pertinent questions answered and he understands and elects to proceed.

## 2019-01-23 NOTE — H&P ADULT - NSHPSOCIALHISTORY_GEN_ALL_CORE
Smoking/etoh/illicit drugs" difficult to illicit from patient, will recommend to attain from family when they are present.

## 2019-01-23 NOTE — CONSULT NOTE ADULT - ASSESSMENT
83 y/o Female with PMH severe dementia, bedbound, pressure ulcers, nonverbal, HTN, HLD, DNR/DNI, recent admission for failure to thrive/debridement of ulcers/PEG tube placement 1 week ago presents with coffee ground debris in PEG tube, and dark coffee ground debris oozing out of mouth    #Coffee ground emesis / Slight drop in Hg/ Recent PEG tube plct and duodenal ulcer/ rule out bleeding duodenal ulcer that stopped vs Buried Bumper sd   Please keep NPO  Check Hg q8h  Keep type and screen active  Protonix 40 IV BID   Will discuss with attending for possible CT vs EGD 83 y/o Female with PMH severe dementia, bedbound, pressure ulcers, nonverbal, HTN, HLD, DNR/DNI, recent admission for failure to thrive/debridement of ulcers/PEG tube placement 1 week ago presents with coffee ground debris in PEG tube, and dark coffee ground debris oozing out of mouth    #Coffee ground emesis / Slight drop in Hg/ Recent PEG tube plct and duodenal ulcer/ rule out bleeding duodenal ulcer that stopped vs Buried Bumper sd   Please keep NPO  Check Hg q8h  Keep type and screen active  Protonix 40 IV BID   Will do EGD today

## 2019-01-23 NOTE — H&P ADULT - HISTORY OF PRESENT ILLNESS
83 y/o Female with PMH severe dementia, bedbound, pressure ulcers, nonverbal, HTN, HLD, DNR/DNI, recent admission for failure to thrive/debridement of ulcers/PEG tube placement 1 week ago presents with coffee ground debris in PEG tube, and dark coffee ground debris oozing out of mouth.   Patient is on aspirin but no other blood thinners. Patient family denies change in activity, diarrhea, dark stools, trauma, fall.

## 2019-01-23 NOTE — PRE-ANESTHESIA EVALUATION ADULT - NSANTHOSAYNRD_GEN_A_CORE
No. NIRAV screening performed.  STOP BANG Legend: 0-2 = LOW Risk; 3-4 = INTERMEDIATE Risk; 5-8 = HIGH Risk

## 2019-01-23 NOTE — H&P ADULT - ASSESSMENT
85 y/o Female with PMH severe dementia, bedbound, pressure ulcers, nonverbal, HTN, HLD, DNR/DNI, recent admission for failure to thrive/debridement of ulcers/PEG tube placement 1 week ago presents with coffee ground debris in PEG tube,    # Failure to thrive 2/2 Progressive /advanced Dementia  - s/p PEG, try to use in 24hr with trophic feeding, registered dietician eval for recommendations  f/u lytes 24 hr s/p feedings, alb, phos, mg, cmp    # AMS 2/2  metabolic encephalopathy  likely due to hypernatremia from  dehydration 2/2 decreased PO intake.  - on NS@75/hour  - GOC discussed with the family : Agreeable with PEG .    # Infected Decubitus ulcer stage 4 on sacrum  - ID eval noted >>switched abx to cefepime/Metro/ vanco  c/w wound care to sacrum, IT, per burn recs    # HTN  -c/w amlodipine    # DLD  -crestor changed to lipitor    # Dementia  c/w memantine      GI ppx:                                   DVT ppx: Heparin 5000mg SubQ   DISPO:  pending  full code 85 y/o Female with PMH severe dementia, bedbound, pressure ulcers, nonverbal, HTN, HLD, DNR/DNI, recent admission for failure to thrive/debridement of ulcers/PEG tube placement 1 week ago presents with coffee ground debris in PEG tube,    # Failure to thrive 2/2 Progressive /advanced Dementia  - s/p PEG  - dietician eval for recommendations    # Coffee ground material from PEG tube and per mouth, likely from upper GI   - will start NS@75/hour  - GI consulted for further evaluation   - pantoprazole BID     # Infected Decubitus ulcer stage 4 on sacrum  - will start cefepime/Metro/vanco  - c/w wound care to sacrum    # HTN  - c/w amlodipine    # DLD  - crestor changed to lipitor    # Dementia  - c/w memantine    # Malnutrition   - dietary evaluation ordered    Activty: ambulate as tolerated  GI ppx: pantoprazole BID ordered                         DVT ppx: Heparin 5000mg SubQ   DISPO:  pending  full code 85 y/o Female with PMH severe dementia, bedbound, pressure ulcers, nonverbal, HTN, HLD, DNR/DNI, recent admission for failure to thrive/debridement of ulcers/PEG tube placement 1 week ago presents with coffee ground debris in PEG tube,    # Failure to thrive 2/2 Progressive /advanced Dementia  - s/p PEG  - dietician eval for recommendations    # Coffee ground material from PEG tube and per mouth, likely from upper GI   - will start NS@75/hour  - GI consulted for further evaluation   - pantoprazole BID     # Infected Decubitus ulcer stage 4 on sacrum  - will start cefepime/Metro/vanco  - c/w wound care to sacrum    # HTN  - c/w amlodipine    # DLD  - c/w lipitor    # Dementia  - c/w memantine    # Malnutrition   - dietary evaluation ordered    Activty: ambulate as tolerated  GI ppx: pantoprazole BID ordered                         DVT ppx: Heparin 5000mg SubQ   DISPO:  pending  full code

## 2019-01-23 NOTE — CHART NOTE - NSCHARTNOTEFT_GEN_A_CORE
Palliative Care Biopsychosocial Assessment:      83 y/o non-verbal Female with PMH severe dementia, bedbound, pressure ulcers, nonverbal, HTN, HLD, DNR/DNI, recent admission for failure to thrive/debridement of ulcers/PEG tube placement 1 week ago presents with coffee ground debris in PEG tube, and dark coffee ground debris oozing out of mouth.   Patient is on aspirin but no other blood thinners. Patient family denies change in activity, diarrhea, dark stools, trauma, fall.      Pt lives with her daughter, Leonardo, who is her primary care-giver. Pt is non-verbal at baseline, bedbound.  Palliative received a consult for goals of care, symptom management.  t/c to family to clarify code status.  As per report, pt scheduled for endoscopy.           Patient Coping Status:       [   ]   coping well        [   ]    coping with  some difficulty       [   ]   difficulty coping     [  x ]   other                                                       Patient Emotional Status:     [   ]   anxious         [   ]   depressed           [   ]  overwhelmed          [   ]   angry         [   ]accepting       [   ]   not accepting           [x   ]   other     Patient Mental Status:      [   ]   alert              [   ]   oriented         [    ]   confused         [  x ] lethargic         [   ]   non-responsive   [   ]   other     Advance Directives:     [   ]    Health Care Surrogate: Name:                             [   ]    Health Care Proxy: Name:   [   ]    MOLST  [   ]    Living Will  [ x  ]    DNR  [ x  ]    DNI    Patient Needs:     [   ]   Supportive Counseling                  [ x  ]   Family Meeting            [   ]    Education                           [   ]   Advance Care Planning         Caregiver Name:   Caregiver needs:     [   ]   Supportive Counseling      [x   ]   Family Conference      [   ]   Education    [   ]   Other     Referral:      [   ]   Community Resources         [   ]   Cancer Support Group     [x   ]    Hospice       [   ]   Bereavement support     [   ]   Pastoral Care      [   ]  [facility] [place]       [   ]  Child Life Services     [   ]   Other                    Spectra #: [phone]

## 2019-01-23 NOTE — CONSULT NOTE ADULT - ASSESSMENT
ASSESSMENT  hypernatremia secondary to dehydration   failure to drive  dysphagia  dementia  pressure ulcers   htn/dld/oa  generalized weakness  plan   awaiting EGD  result  check bmp/phos/mg and correct lytes ASSESSMENT  admitted with bleeding from GT  hypernatremia secondary to dehydration   failure to drive  dysphagia  dementia  pressure ulcers   htn/dld/oa  generalized weakness    plan   awaiting EGD  result  check bmp/phos/mg and correct lytes

## 2019-01-23 NOTE — H&P ADULT - ATTENDING COMMENTS
Patient seen and examined independently. Agree with resident note/ history / physical exam and plan of care with following exceptions/additions/updates. Case discussed with house-staff, nursing and patient/pt decision maker  PEG placed last week.   GIB, EGD today  Stage4 DU, local wound care  Demenia  non verbal    Pt is a candidate for palliative care only/conservative management, or hospice. Palliative is going to see pt and discuss option and goals of care with them ( I spoke with palliative care team)

## 2019-01-23 NOTE — CONSULT NOTE ADULT - SUBJECTIVE AND OBJECTIVE BOX
REQUESTED OF: DR Haley    CLINICAL ISSUE TO BE EVALUATED BY CONSULTANT: Goals of care and Symptom Mangement        JUAQUIN TEIXEIRA 84yFemale  HPI:  85 y/o Female with PMH severe dementia, bedbound, pressure ulcers, nonverbal, HTN, HLD, DNR/DNI, recent admission for failure to thrive/debridement of ulcers/PEG tube placement 1 week ago presents with coffee ground debris in PEG tube, and dark coffee ground debris oozing out of mouth.   Patient is on aspirin but no other blood thinners. Patient family denies change in activity, diarrhea, dark stools, trauma, fall. (23 Jan 2019 05:08)        PAST MEDICAL & SURGICAL HISTORY:  Bedsore  Pressure ulcer  Bedbound  Nonverbal  Arthritis  High cholesterol  Dementia  HTN (hypertension)  History of surgery: peg tube placement  S/P appendectomy        PHYSICAL EXAM:      Constitutional: pt appears chronically ill and debilitated    Respiratory: Diminished B/L no cough, no wheeze no crackles    Cardiovascular: (-) JVD     Gastrointestinal: soft NT PEG in situ     Genitourinary: wagner to straight drainage w yellow urine     Extremities: contracted all 4 extremities, muscle wasting noted    Neurological: non verbal, responds to verbal and tactile stimuli. Does not follow commands    Skin: see nursing assessment - has multi decubiti         T(C): 37.4, Max: 38.5 (21:10)  HR: 91 (75 - 96)  BP: 121/56 (102/51 - 135/56)  RR: 18 (16 - 20)  SpO2: 97% (92% - 97%)      LABS/STUDIES:  01-22    142  |  98  |  26<H>  ----------------------------<  224<H>  3.6   |  30  |  1.1    Ca    9.2      22 Jan 2019 20:50    TPro  6.0  /  Alb  2.5<L>  /  TBili  0.3  /  DBili  <0.2  /  AST  18  /  ALT  15  /  AlkPhos  67  01-22                            9.1    13.21 )-----------( 251      ( 23 Jan 2019 01:58 )             29.1       MEDICATIONS  (STANDING):  amLODIPine   Tablet 10 milliGRAM(s) Oral daily  ascorbic acid 500 milliGRAM(s) Oral daily  atorvastatin 20 milliGRAM(s) Oral at bedtime  cefepime  Injectable. 1000 milliGRAM(s) IV Push every 12 hours  dextrose 5% + sodium chloride 0.9%. 1000 milliLiter(s) (75 mL/Hr) IV Continuous <Continuous>  docusate sodium 100 milliGRAM(s) Oral two times a day  memantine 10 milliGRAM(s) Oral daily  metroNIDAZOLE    Tablet 500 milliGRAM(s) Oral every 8 hours  pantoprazole   Suspension 40 milliGRAM(s) Oral <User Schedule>  vancomycin  IVPB 500 milliGRAM(s) IV Intermittent every 12 hours    MEDICATIONS  (PRN):      PPS (Palliative Performance Scale): 10

## 2019-01-23 NOTE — CONSULT NOTE ADULT - SUBJECTIVE AND OBJECTIVE BOX
HPI:  83 y/o Female with  recent admission for failure to thrive/debridement of ulcers/PEG tube placement 1 week ago presents with coffee ground debris in PEG tube, and dark coffee ground debris oozing out of mouth.   Patient is on aspirin but no other blood thinners. Patient family denies change in activity, diarrhea, dark stools, trauma, fall. (23 Jan 2019 05:08)  s/p EGD today  hypokalemia  PAST MEDICAL & SURGICAL HISTORY:  Bedsore  Pressure ulcer  Bedbound  Nonverbal  Arthritis  High cholesterol  Dementia  HTN (hypertension)  History of surgery: peg tube placement  S/P appendectomy  ICU Vital Signs Last 24 Hrs  T(C): 37 (23 Jan 2019 12:14), Max: 38.5 (22 Jan 2019 21:10)  T(F): 98.6 (23 Jan 2019 11:08), Max: 101.3 (22 Jan 2019 21:10)  HR: 89 (23 Jan 2019 13:43) (75 - 99)  BP: 105/59 (23 Jan 2019 13:43) (86/47 - 135/56)  RR: 18 (23 Jan 2019 13:43) (16 - 20)  SpO2: 94% (23 Jan 2019 13:43) (92% - 97%)    Height (cm): 162.56 (01-16-19 @ 14:55), 157.48 (12-24-18 @ 08:01)  Weight (kg): 43.1 (01-23-19 @ 12:14), 45.9 (01-16-19 @ 14:55), 47.8 (12-24-18 @ 08:01)  BMI (kg/m2): 16.3 (01-23-19 @ 12:14), 17.4 (01-16-19 @ 14:55), 19.3 (12-24-18 @ 08:01)  BSA (m2): 1.43 (01-23-19 @ 12:14), 1.46 (01-16-19 @ 14:55), 1.46 (12-24-18 @ 08:01)  I&O's Detail    22 Jan 2019 07:01  -  23 Jan 2019 07:00  --------------------------------------------------------  IN:    dextrose 5% + sodium chloride 0.9%.: 75 mL  Total IN: 75 mL    OUT:    Indwelling Catheter - Urethral: 400 mL  Total OUT: 400 mL    Total NET: -325 mL      23 Jan 2019 07:01  -  23 Jan 2019 14:38  --------------------------------------------------------  IN:    dextrose 5% + sodium chloride 0.9%.: 375 mL  Total IN: 375 mL    OUT:  Total OUT: 0 mL    Total NET: 375 mL  PHYSICAL EXAM:  GENERAL: pt is laying  in bed comfortably , nasal cannula in place  HEENT: Moist mucous membranes  ABDOMEN: Soft, Nontender, Nondistended + peg in place  EXTREMITIES: no edema  IV ACCESS: peripheral IV lines  FEEDING ACCESS: npo    MEDICATIONS  (STANDING):  amLODIPine   Tablet 10 milliGRAM(s) Oral daily  ascorbic acid 500 milliGRAM(s) Oral daily  atorvastatin 20 milliGRAM(s) Oral at bedtime  cefepime  Injectable. 1000 milliGRAM(s) IV Push every 12 hours  chlorhexidine 4% Liquid 1 Application(s) Topical <User Schedule>  dextrose 5% + sodium chloride 0.9%. 1000 milliLiter(s) (75 mL/Hr) IV Continuous <Continuous>  docusate sodium 100 milliGRAM(s) Oral two times a day  memantine 10 milliGRAM(s) Oral daily  metroNIDAZOLE    Tablet 500 milliGRAM(s) Oral every 8 hours  pantoprazole   Suspension 40 milliGRAM(s) Oral <User Schedule>  vancomycin  IVPB 500 milliGRAM(s) IV Intermittent every 12 hours    MEDICATIONS  (PRN):  Allergies  penicillin (Unknown)    LABS:    01-22    142  |  98  |  26<H>  ----------------------------<  224<H>  3.6   |  30  |  1.1    Ca    9.2      22 Jan 2019 20:50    TPro  6.0  /  Alb  2.5<L>  /  TBili  0.3  /  DBili  <0.2  /  AST  18  /  ALT  15  /  AlkPhos  67  01-22    CAPILLARY BLOOD GLUCOSE  PT/INR - ( 22 Jan 2019 20:50 )   PT: 13.60 sec;   INR: 1.18 ratio      PTT - ( 22 Jan 2019 20:50 )  PTT:25.3 sec  RADIOLOGY:  < from: Xray Chest 1 View-PORTABLE IMMEDIATE (01.22.19 @ 22:43) >  Impression:    Left lower lobe opacity, follow-up suggested. HPI:  83 y/o Female with  recent admission for failure to thrive/debridement of ulcers/PEG tube placement 1 week ago presents with coffee ground debris in PEG tube, and dark coffee ground debris oozing out of mouth.   Patient is on aspirin but no other blood thinners. Patient family denies change in activity, diarrhea, dark stools, trauma, fall. (23 Jan 2019 05:08)  s/p EGD today  hypokalemia    PAST MEDICAL & SURGICAL HISTORY:  Bedsore  Pressure ulcer  Bedbound  Nonverbal  Arthritis  High cholesterol  Dementia  HTN (hypertension)  History of surgery: peg tube placement  S/P appendectomy    ICU Vital Signs Last 24 Hrs  T(C): 37 (23 Jan 2019 12:14), Max: 38.5 (22 Jan 2019 21:10)  T(F): 98.6 (23 Jan 2019 11:08), Max: 101.3 (22 Jan 2019 21:10)  HR: 89 (23 Jan 2019 13:43) (75 - 99)  BP: 105/59 (23 Jan 2019 13:43) (86/47 - 135/56)  RR: 18 (23 Jan 2019 13:43) (16 - 20)  SpO2: 94% (23 Jan 2019 13:43) (92% - 97%)    Height (cm): 162.56 (01-16-19 @ 14:55), 157.48 (12-24-18 @ 08:01)  Weight (kg): 43.1 (01-23-19 @ 12:14), 45.9 (01-16-19 @ 14:55), 47.8 (12-24-18 @ 08:01)  BMI (kg/m2): 16.3 (01-23-19 @ 12:14), 17.4 (01-16-19 @ 14:55), 19.3 (12-24-18 @ 08:01)  BSA (m2): 1.43 (01-23-19 @ 12:14), 1.46 (01-16-19 @ 14:55), 1.46 (12-24-18 @ 08:01)  I&O's Detail    22 Jan 2019 07:01  -  23 Jan 2019 07:00  --------------------------------------------------------  IN:    dextrose 5% + sodium chloride 0.9%.: 75 mL  Total IN: 75 mL    OUT:    Indwelling Catheter - Urethral: 400 mL  Total OUT: 400 mL    Total NET: -325 mL      23 Jan 2019 07:01  -  23 Jan 2019 14:38  --------------------------------------------------------  IN:    dextrose 5% + sodium chloride 0.9%.: 375 mL  Total IN: 375 mL    OUT:  Total OUT: 0 mL    Total NET: 375 mL  PHYSICAL EXAM:  GENERAL: pt is laying  in bed comfortably , nasal cannula in place  HEENT: Moist mucous membranes  ABDOMEN: Soft, Nontender, Nondistended + peg in place  EXTREMITIES: no edema  IV ACCESS: peripheral IV lines  FEEDING ACCESS: npo    MEDICATIONS  (STANDING):  amLODIPine   Tablet 10 milliGRAM(s) Oral daily  ascorbic acid 500 milliGRAM(s) Oral daily  atorvastatin 20 milliGRAM(s) Oral at bedtime  cefepime  Injectable. 1000 milliGRAM(s) IV Push every 12 hours  chlorhexidine 4% Liquid 1 Application(s) Topical <User Schedule>  dextrose 5% + sodium chloride 0.9%. 1000 milliLiter(s) (75 mL/Hr) IV Continuous <Continuous>  docusate sodium 100 milliGRAM(s) Oral two times a day  memantine 10 milliGRAM(s) Oral daily  metroNIDAZOLE    Tablet 500 milliGRAM(s) Oral every 8 hours  pantoprazole   Suspension 40 milliGRAM(s) Oral <User Schedule>  vancomycin  IVPB 500 milliGRAM(s) IV Intermittent every 12 hours    MEDICATIONS  (PRN):  Allergies  penicillin (Unknown)    LABS:    01-22    142  |  98  |  26<H>  ----------------------------<  224<H>  3.6   |  30  |  1.1    Ca    9.2      22 Jan 2019 20:50    TPro  6.0  /  Alb  2.5<L>  /  TBili  0.3  /  DBili  <0.2  /  AST  18  /  ALT  15  /  AlkPhos  67  01-22    CAPILLARY BLOOD GLUCOSE  PT/INR - ( 22 Jan 2019 20:50 )   PT: 13.60 sec;   INR: 1.18 ratio      PTT - ( 22 Jan 2019 20:50 )  PTT:25.3 sec  RADIOLOGY:  < from: Xray Chest 1 View-PORTABLE IMMEDIATE (01.22.19 @ 22:43) >  Impression:    Left lower lobe opacity, follow-up suggested.

## 2019-01-24 LAB
ANION GAP SERPL CALC-SCNC: 14 MMOL/L — SIGNIFICANT CHANGE UP (ref 7–14)
BASOPHILS # BLD AUTO: 0.05 K/UL — SIGNIFICANT CHANGE UP (ref 0–0.2)
BASOPHILS NFR BLD AUTO: 0.5 % — SIGNIFICANT CHANGE UP (ref 0–1)
BUN SERPL-MCNC: 11 MG/DL — SIGNIFICANT CHANGE UP (ref 10–20)
CALCIUM SERPL-MCNC: 7.3 MG/DL — LOW (ref 8.5–10.1)
CHLORIDE SERPL-SCNC: 109 MMOL/L — SIGNIFICANT CHANGE UP (ref 98–110)
CO2 SERPL-SCNC: 26 MMOL/L — SIGNIFICANT CHANGE UP (ref 17–32)
CREAT SERPL-MCNC: 1 MG/DL — SIGNIFICANT CHANGE UP (ref 0.7–1.5)
EOSINOPHIL # BLD AUTO: 0.24 K/UL — SIGNIFICANT CHANGE UP (ref 0–0.7)
EOSINOPHIL NFR BLD AUTO: 2.5 % — SIGNIFICANT CHANGE UP (ref 0–8)
GLUCOSE SERPL-MCNC: 130 MG/DL — HIGH (ref 70–99)
HCT VFR BLD CALC: 31.1 % — LOW (ref 37–47)
HGB BLD-MCNC: 9.8 G/DL — LOW (ref 12–16)
IMM GRANULOCYTES NFR BLD AUTO: 1.2 % — HIGH (ref 0.1–0.3)
LYMPHOCYTES # BLD AUTO: 1.32 K/UL — SIGNIFICANT CHANGE UP (ref 1.2–3.4)
LYMPHOCYTES # BLD AUTO: 13.8 % — LOW (ref 20.5–51.1)
MAGNESIUM SERPL-MCNC: 1.8 MG/DL — SIGNIFICANT CHANGE UP (ref 1.8–2.4)
MCHC RBC-ENTMCNC: 24.6 PG — LOW (ref 27–31)
MCHC RBC-ENTMCNC: 31.5 G/DL — LOW (ref 32–37)
MCV RBC AUTO: 78.1 FL — LOW (ref 81–99)
MONOCYTES # BLD AUTO: 0.58 K/UL — SIGNIFICANT CHANGE UP (ref 0.1–0.6)
MONOCYTES NFR BLD AUTO: 6.1 % — SIGNIFICANT CHANGE UP (ref 1.7–9.3)
NEUTROPHILS # BLD AUTO: 7.24 K/UL — HIGH (ref 1.4–6.5)
NEUTROPHILS NFR BLD AUTO: 75.9 % — HIGH (ref 42.2–75.2)
NRBC # BLD: 0 /100 WBCS — SIGNIFICANT CHANGE UP (ref 0–0)
PHOSPHATE SERPL-MCNC: 1.8 MG/DL — LOW (ref 2.1–4.9)
PLATELET # BLD AUTO: 251 K/UL — SIGNIFICANT CHANGE UP (ref 130–400)
POTASSIUM SERPL-MCNC: 2.8 MMOL/L — LOW (ref 3.5–5)
POTASSIUM SERPL-SCNC: 2.8 MMOL/L — LOW (ref 3.5–5)
RBC # BLD: 3.98 M/UL — LOW (ref 4.2–5.4)
RBC # FLD: 17.7 % — HIGH (ref 11.5–14.5)
SODIUM SERPL-SCNC: 149 MMOL/L — HIGH (ref 135–146)
WBC # BLD: 9.54 K/UL — SIGNIFICANT CHANGE UP (ref 4.8–10.8)
WBC # FLD AUTO: 9.54 K/UL — SIGNIFICANT CHANGE UP (ref 4.8–10.8)

## 2019-01-24 RX ORDER — POTASSIUM CHLORIDE 20 MEQ
20 PACKET (EA) ORAL
Qty: 0 | Refills: 0 | Status: DISCONTINUED | OUTPATIENT
Start: 2019-01-24 | End: 2019-01-24

## 2019-01-24 RX ORDER — POTASSIUM PHOSPHATE, MONOBASIC POTASSIUM PHOSPHATE, DIBASIC 236; 224 MG/ML; MG/ML
15 INJECTION, SOLUTION INTRAVENOUS ONCE
Qty: 0 | Refills: 0 | Status: COMPLETED | OUTPATIENT
Start: 2019-01-24 | End: 2019-01-24

## 2019-01-24 RX ORDER — SODIUM,POTASSIUM PHOSPHATES 278-250MG
1 POWDER IN PACKET (EA) ORAL
Qty: 0 | Refills: 0 | Status: DISCONTINUED | OUTPATIENT
Start: 2019-01-24 | End: 2019-01-24

## 2019-01-24 RX ORDER — SODIUM,POTASSIUM PHOSPHATES 278-250MG
1 POWDER IN PACKET (EA) ORAL THREE TIMES A DAY
Qty: 0 | Refills: 0 | Status: DISCONTINUED | OUTPATIENT
Start: 2019-01-24 | End: 2019-01-29

## 2019-01-24 RX ORDER — MEMANTINE HYDROCHLORIDE 10 MG/1
10 TABLET ORAL DAILY
Qty: 0 | Refills: 0 | Status: DISCONTINUED | OUTPATIENT
Start: 2019-01-24 | End: 2019-01-26

## 2019-01-24 RX ORDER — ATORVASTATIN CALCIUM 80 MG/1
20 TABLET, FILM COATED ORAL AT BEDTIME
Qty: 0 | Refills: 0 | Status: DISCONTINUED | OUTPATIENT
Start: 2019-01-24 | End: 2019-01-26

## 2019-01-24 RX ORDER — ASCORBIC ACID 60 MG
500 TABLET,CHEWABLE ORAL DAILY
Qty: 0 | Refills: 0 | Status: DISCONTINUED | OUTPATIENT
Start: 2019-01-24 | End: 2019-01-26

## 2019-01-24 RX ORDER — DOCUSATE SODIUM 100 MG
100 CAPSULE ORAL
Qty: 0 | Refills: 0 | Status: DISCONTINUED | OUTPATIENT
Start: 2019-01-24 | End: 2019-01-29

## 2019-01-24 RX ORDER — SODIUM,POTASSIUM PHOSPHATES 278-250MG
1 POWDER IN PACKET (EA) ORAL THREE TIMES A DAY
Qty: 0 | Refills: 0 | Status: DISCONTINUED | OUTPATIENT
Start: 2019-01-24 | End: 2019-01-24

## 2019-01-24 RX ORDER — AMLODIPINE BESYLATE 2.5 MG/1
10 TABLET ORAL DAILY
Qty: 0 | Refills: 0 | Status: DISCONTINUED | OUTPATIENT
Start: 2019-01-24 | End: 2019-01-29

## 2019-01-24 RX ORDER — POTASSIUM CHLORIDE 20 MEQ
40 PACKET (EA) ORAL ONCE
Qty: 0 | Refills: 0 | Status: COMPLETED | OUTPATIENT
Start: 2019-01-24 | End: 2019-01-24

## 2019-01-24 RX ADMIN — Medication 100 MILLIGRAM(S): at 18:47

## 2019-01-24 RX ADMIN — Medication 50 MILLIEQUIVALENT(S): at 11:45

## 2019-01-24 RX ADMIN — SODIUM CHLORIDE 75 MILLILITER(S): 9 INJECTION, SOLUTION INTRAVENOUS at 23:41

## 2019-01-24 RX ADMIN — POTASSIUM PHOSPHATE, MONOBASIC POTASSIUM PHOSPHATE, DIBASIC 41.67 MILLIMOLE(S): 236; 224 INJECTION, SOLUTION INTRAVENOUS at 20:05

## 2019-01-24 RX ADMIN — Medication 1 PACKET(S): at 15:31

## 2019-01-24 RX ADMIN — PANTOPRAZOLE SODIUM 40 MILLIGRAM(S): 20 TABLET, DELAYED RELEASE ORAL at 05:09

## 2019-01-24 RX ADMIN — Medication 500 MILLIGRAM(S): at 11:46

## 2019-01-24 RX ADMIN — MEMANTINE HYDROCHLORIDE 10 MILLIGRAM(S): 10 TABLET ORAL at 11:46

## 2019-01-24 RX ADMIN — Medication 100 MILLIGRAM(S): at 05:09

## 2019-01-24 RX ADMIN — Medication 500 MILLIGRAM(S): at 15:32

## 2019-01-24 RX ADMIN — PANTOPRAZOLE SODIUM 40 MILLIGRAM(S): 20 TABLET, DELAYED RELEASE ORAL at 18:49

## 2019-01-24 RX ADMIN — Medication 500 MILLIGRAM(S): at 05:09

## 2019-01-24 RX ADMIN — SODIUM CHLORIDE 75 MILLILITER(S): 9 INJECTION, SOLUTION INTRAVENOUS at 11:44

## 2019-01-24 RX ADMIN — AMLODIPINE BESYLATE 10 MILLIGRAM(S): 2.5 TABLET ORAL at 06:48

## 2019-01-24 RX ADMIN — Medication 40 MILLIEQUIVALENT(S): at 11:45

## 2019-01-24 RX ADMIN — Medication 100 MILLIGRAM(S): at 05:34

## 2019-01-24 NOTE — PROGRESS NOTE ADULT - ASSESSMENT
83 y/o Female with PMH severe dementia, bedbound, pressure ulcers, nonverbal, HTN, HLD, DNR/DNI, recent admission for failure to thrive/debridement of ulcers/PEG tube placement 1 week ago presents with coffee ground debris in PEG tube, and dark coffee ground debris oozing out of mouth    #Coffee ground emesis / Recent PEG tube plct    SP EGD yesterday that showed ulceration in the hiatal hernia fundus sp injection and clip with erosive grade 4 esophagitis   Rec   Please check Hg today and if stable start tube feeding   Check Hg q 12 h  Keep type and screen active  Protonix 40  BID 83 y/o Female with PMH severe dementia, bedbound, pressure ulcers, nonverbal, HTN, HLD, DNR/DNI, recent admission for failure to thrive/debridement of ulcers/PEG tube placement 1 week ago presents with coffee ground debris in PEG tube, and dark coffee ground debris oozing out of mouth    #Coffee ground emesis / Recent PEG tube plct    SP EGD yesterday that showed ulceration in the hiatal hernia fundus sp injection and clip with erosive grade 4 esophagitis  Hg stable, no signs of active GI bleed, no melena or hematochezia    Rec   Please start PEG feeding   Check Hg q 12 h  Keep type and screen active  Protonix 40  BID 83 y/o Female with PMH severe dementia, bedbound, pressure ulcers, nonverbal, HTN, HLD, DNR/DNI, recent admission for failure to thrive/debridement of ulcers/PEG tube placement 1 week ago presents with coffee ground debris in PEG tube, and dark coffee ground debris oozing out of mouth    #Coffee ground emesis / Recent PEG tube plct    SP EGD yesterday that showed diulafoy lesion in the hiatal hernia fundus sp injection and clip with erosive grade 4 esophagitis  Hg stable, no signs of active GI bleed, no melena or hematochezia    Rec   Please start PEG feeding   Check Hg q 12 h  Keep type and screen active  Protonix 40  BID

## 2019-01-24 NOTE — PROGRESS NOTE ADULT - ASSESSMENT
IMP:  - bleeding from new GT  - anemia  - hypokalemia  - failure to thrive, progression of dementia with dysphagia, recent wt loss even after GT placed, + malnutrition/ chronic  - sacral decubitus ulcer    SUGGEST:  - called home vendor noted on previous discharge, and home Rx obtained. It needs significant revision to be appropriate for home care.  - resume feeds today with Jevity 1.2 240 ml x 3 feeds/d x next 24h  - if no further bleeding, increase to 360 ml Jevity 1.2 x 3 feeds/d  - for home, feed on meal-pattern schedule, not q8h  - if pt remains afebrile and wbc stable x next 24h, add Maximilian one packet in 4 oz water twice a day x 14 days  - 15 mM K-phos iv over 6 h once now, as phos 1.8 is a serious matter. This also provides 22 mEq of K. pt may thereafter need 20 mEq KCl powder via GT with each of the next 3-4 feedings (preferably not on an empty stomach) and one NeutraPhos with each of the next 2-3 feeds. Then re-evaluate.

## 2019-01-24 NOTE — PROGRESS NOTE ADULT - ATTENDING COMMENTS
Patient seen and examined independently. Agree with resident note/ history / physical exam and plan of care with following exceptions/additions/updates. Case discussed with house-staff, nursing and patient/pt decision maker.   if cbc stable may dc home  family is agreeable with hospice, hospice called.   anticipate dc in am ( probably home hospice)   DNR DNI

## 2019-01-24 NOTE — PROGRESS NOTE ADULT - SUBJECTIVE AND OBJECTIVE BOX
We are following the patient for GI bleed     GI HPI Today:  Patient did not have any BM or Vomiting   PAST MEDICAL & SURGICAL HISTORY  Bedsore  Pressure ulcer  Bedbound  Nonverbal  Arthritis  High cholesterol  Dementia  HTN (hypertension)  History of surgery: peg tube placement  S/P appendectomy      ALLERGIES:  penicillin (Unknown)      MEDICATIONS:  MEDICATIONS  (STANDING):  amLODIPine   Tablet 10 milliGRAM(s) Oral daily  ascorbic acid 500 milliGRAM(s) Oral daily  atorvastatin 20 milliGRAM(s) Oral at bedtime  cefepime  Injectable. 1000 milliGRAM(s) IV Push every 12 hours  chlorhexidine 4% Liquid 1 Application(s) Topical <User Schedule>  dextrose 5% + sodium chloride 0.9%. 1000 milliLiter(s) (75 mL/Hr) IV Continuous <Continuous>  docusate sodium 100 milliGRAM(s) Oral two times a day  memantine 10 milliGRAM(s) Oral daily  metroNIDAZOLE    Tablet 500 milliGRAM(s) Oral every 8 hours  pantoprazole   Suspension 40 milliGRAM(s) Oral <User Schedule>  vancomycin  IVPB 500 milliGRAM(s) IV Intermittent every 12 hours    MEDICATIONS  (PRN):      REVIEW OF SYSTEMS  Unable to obtain        VITALS:   T(F): 98.6 (01-24 @ 05:03), Max: 101.3 (01-22 @ 21:10)  HR: 74 (01-24 @ 05:03) (74 - 99)  BP: 113/59 (01-24 @ 06:47) (86/47 - 135/56)  BP(mean): --  RR: 16 (01-24 @ 05:03) (16 - 20)  SpO2: 98% (01-23 @ 19:58) (92% - 100%)        PHYSICAL EXAM:  GENERAL:  Appears in no distress  HEENT:  Conjunctivae Anicteric   CHEST:  Full & symmetric excursion  HEART:  NS1, S2,   ABDOMEN:  Soft, non-tender, non-distended, normoactive bowel sounds,  no masses   EXTEREMITIES:  no edema  SKIN:  No rash         Blood Work :                        9.3    12.18 )-----------( 273      ( 23 Jan 2019 17:06 )             29.6     PT/INR - ( 22 Jan 2019 20:50 )  INR: 1.18          PTT - ( 22 Jan 2019 20:50 )  PTT:25.3<L>  01-23    145  |  105  |  17  ----------------------------<  300<H>  3.2<L>   |  25  |  1.0    Ca    7.9<L>      23 Jan 2019 17:06  Mg     1.8     01-23      CBC -  ( 23 Jan 2019 17:06 )  Hemoglobin : 9.3    CBC -  ( 23 Jan 2019 01:58 )  Hemoglobin : 9.1    CBC -  ( 22 Jan 2019 20:50 )  Hemoglobin : 9.5      LIVER FUNCTIONS - ( 22 Jan 2019 20:50 )  Alb: 2.5 [3.5 - 5.2] / Pro: 6.0 [6.0 - 8.0] / ALK PHOS: 67 [30 - 115] / ALT: 15 [0 - 41] / AST: 18 [0 - 41] / GGT: x We are following the patient for GI bleed     GI HPI Today:  Patient did not have any BM or Vomiting     PAST MEDICAL & SURGICAL HISTORY  Bedsore  Pressure ulcer  Bedbound  Nonverbal  Arthritis  High cholesterol  Dementia  HTN (hypertension)  History of surgery: peg tube placement  S/P appendectomy  diulafoy lesion, stomach      ALLERGIES:  penicillin (Unknown)      MEDICATIONS:  MEDICATIONS  (STANDING):  amLODIPine   Tablet 10 milliGRAM(s) Oral daily  ascorbic acid 500 milliGRAM(s) Oral daily  atorvastatin 20 milliGRAM(s) Oral at bedtime  cefepime  Injectable. 1000 milliGRAM(s) IV Push every 12 hours  chlorhexidine 4% Liquid 1 Application(s) Topical <User Schedule>  dextrose 5% + sodium chloride 0.9%. 1000 milliLiter(s) (75 mL/Hr) IV Continuous <Continuous>  docusate sodium 100 milliGRAM(s) Oral two times a day  memantine 10 milliGRAM(s) Oral daily  metroNIDAZOLE    Tablet 500 milliGRAM(s) Oral every 8 hours  pantoprazole   Suspension 40 milliGRAM(s) Oral <User Schedule>  vancomycin  IVPB 500 milliGRAM(s) IV Intermittent every 12 hours    MEDICATIONS  (PRN):      REVIEW OF SYSTEMS  Unable to obtain        VITALS:   T(F): 98.6 (01-24 @ 05:03), Max: 101.3 (01-22 @ 21:10)  HR: 74 (01-24 @ 05:03) (74 - 99)  BP: 113/59 (01-24 @ 06:47) (86/47 - 135/56)  BP(mean): --  RR: 16 (01-24 @ 05:03) (16 - 20)  SpO2: 98% (01-23 @ 19:58) (92% - 100%)        PHYSICAL EXAM:  GENERAL:  Appears in no distress  HEENT:  Conjunctivae Anicteric   CHEST:  Full & symmetric excursion  HEART:  NS1, S2,   ABDOMEN:  Soft, non-tender, non-distended, normoactive bowel sounds,  no masses   EXTEREMITIES:  no edema  SKIN:  No rash         Blood Work :                        9.3    12.18 )-----------( 273      ( 23 Jan 2019 17:06 )             29.6     PT/INR - ( 22 Jan 2019 20:50 )  INR: 1.18          PTT - ( 22 Jan 2019 20:50 )  PTT:25.3<L>  01-23    145  |  105  |  17  ----------------------------<  300<H>  3.2<L>   |  25  |  1.0    Ca    7.9<L>      23 Jan 2019 17:06  Mg     1.8     01-23      CBC -  ( 23 Jan 2019 17:06 )  Hemoglobin : 9.3    CBC -  ( 23 Jan 2019 01:58 )  Hemoglobin : 9.1    CBC -  ( 22 Jan 2019 20:50 )  Hemoglobin : 9.5      LIVER FUNCTIONS - ( 22 Jan 2019 20:50 )  Alb: 2.5 [3.5 - 5.2] / Pro: 6.0 [6.0 - 8.0] / ALK PHOS: 67 [30 - 115] / ALT: 15 [0 - 41] / AST: 18 [0 - 41] / GGT: x

## 2019-01-24 NOTE — PROGRESS NOTE ADULT - SUBJECTIVE AND OBJECTIVE BOX
85 yo woman readmitted with coffee ground material from new GT.  EGD + ulceration in fundus/ hiatal hernia s/p injection and clipping, and severe esophagitis.  pt just discharged a week ago after GT placement.  No information regarding feedings or formula or fluid or methodology or supplies or anything regarding GT obtained from the family by nursing or admitting staff.  Per GI, ok to slowly resume GT use today.  pt sleepy, calm, no c/o pain, not verbal  skin turgor fair, conj pale  43.1 kg on admission, was 45.9 kg on 1/11/19.    MEDICATIONS  (STANDING):  amLODIPine   Tablet 10 milliGRAM(s) Oral daily  ascorbic acid 500 milliGRAM(s) Oral daily  atorvastatin 20 milliGRAM(s) Oral at bedtime  cefepime  Injectable. 1000 milliGRAM(s) IV Push every 12 hours  chlorhexidine 4% Liquid 1 Application(s) Topical <User Schedule>  dextrose 5% + sodium chloride 0.9%. 1000 milliLiter(s) (75 mL/Hr) IV Continuous <Continuous>  docusate sodium 100 milliGRAM(s) Oral two times a day  memantine 10 milliGRAM(s) Oral daily  metroNIDAZOLE    Tablet 500 milliGRAM(s) Oral every 8 hours  pantoprazole   Suspension 40 milliGRAM(s) Oral <User Schedule>  potassium phosphate / sodium phosphate powder 1 Packet(s) Oral three times a day   -- not given yet per EMR  potassium phosphate IVPB 15 milliMole(s) IV Intermittent once   -- ordered by me just now  vancomycin  IVPB 500 milliGRAM(s) IV Intermittent every 12 hours                        9.8    9.54  )-----------( 251      ( 24 Jan 2019 08:06 )             31.1   01-24    149<H>  |  109  |  11  ----------------------------<  130<H>  2.8<L>   |  26  |  1.0    Ca    7.3<L>      24 Jan 2019 08:06  Phos  1.8     01-24  Mg     1.8     01-24    TPro  6.0  /  Alb  2.5<L>  /  TBili  0.3  /  DBili  <0.2  /  AST  18  /  ALT  15  /  AlkPhos  67  01-22

## 2019-01-24 NOTE — PROGRESS NOTE ADULT - SUBJECTIVE AND OBJECTIVE BOX
SUBJECTIVE:    Non-verbal. Appears comfortable. Starting feeds today. Spoke with son + daughter in law. Would like mother evaluated for hospice and possible placement. Consult placed. Will c/w Abx until hospice eval.    PAST MEDICAL & SURGICAL HISTORY  Bedsore  Pressure ulcer  Bedbound  Nonverbal  Arthritis  High cholesterol  Dementia  HTN (hypertension)  History of surgery: peg tube placement  S/P appendectomy    SOCIAL HISTORY:  Negative for smoking/alcohol/drug use.     ALLERGIES:  penicillin (Unknown)    MEDICATIONS:  STANDING MEDICATIONS  amLODIPine   Tablet 10 milliGRAM(s) Oral daily  ascorbic acid 500 milliGRAM(s) Oral daily  atorvastatin 20 milliGRAM(s) Oral at bedtime  cefepime  Injectable. 1000 milliGRAM(s) IV Push every 12 hours  chlorhexidine 4% Liquid 1 Application(s) Topical <User Schedule>  dextrose 5% + sodium chloride 0.9%. 1000 milliLiter(s) IV Continuous <Continuous>  docusate sodium 100 milliGRAM(s) Oral two times a day  memantine 10 milliGRAM(s) Oral daily  metroNIDAZOLE    Tablet 500 milliGRAM(s) Oral every 8 hours  pantoprazole   Suspension 40 milliGRAM(s) Oral <User Schedule>  potassium acid phosphate/sodium acid phosphate tablet (K-PHOS No. 2) 1 Tablet(s) Oral four times a day with meals  vancomycin  IVPB 500 milliGRAM(s) IV Intermittent every 12 hours    PRN MEDICATIONS    VITALS:   T(F): 98.7  HR: 84  BP: 116/56  RR: 16  SpO2: 98%    LABS:                        9.8    9.54  )-----------( 251      ( 24 Jan 2019 08:06 )             31.1     01-24    149<H>  |  109  |  11  ----------------------------<  130<H>  2.8<L>   |  26  |  1.0    Ca    7.3<L>      24 Jan 2019 08:06  Phos  1.8     01-24  Mg     1.8     01-24    TPro  6.0  /  Alb  2.5<L>  /  TBili  0.3  /  DBili  <0.2  /  AST  18  /  ALT  15  /  AlkPhos  67  01-22    PT/INR - ( 22 Jan 2019 20:50 )   PT: 13.60 sec;   INR: 1.18 ratio      PTT - ( 22 Jan 2019 20:50 )  PTT:25.3 sec  Urinalysis Basic - ( 22 Jan 2019 22:25 )    Color: Dark Yellow / Appearance: Cloudy / SG: >=1.030 / pH: x  Gluc: x / Ketone: Negative  / Bili: Negative / Urobili: 0.2   Blood: x / Protein: 100 / Nitrite: Negative   Leuk Esterase: Small / RBC: x / WBC 3-5 /HPF   Sq Epi: x / Non Sq Epi: Few /HPF / Bacteria: x    Culture - Urine (collected 22 Jan 2019 22:25)  Source: .Urine Clean Catch (Midstream)  Final Report (23 Jan 2019 23:19):    No growth    Culture - Blood (collected 22 Jan 2019 22:13)  Source: .Blood Blood-Peripheral  Preliminary Report (24 Jan 2019 02:02):    No growth to date.    Culture - Blood (collected 22 Jan 2019 22:13)  Source: .Blood Blood-Peripheral  Preliminary Report (24 Jan 2019 02:02):    No growth to date.    PHYSICAL EXAM:  · Constitutional	detailed exam	  · Constitutional Details	cachectic	  · Respiratory	Breath Sounds equal & clear to percussion & auscultation, no accessory muscle use	  · Cardiovascular	Regular rate & rhythm, normal S1, S2; no murmurs, gallops or rubs; no S3, S4	  · Gastrointestinal	detailed exam	  · GI Normal	normal; soft; PEG tube present	  · Extremities	No cyanosis, clubbing or edema	  · Skin	detailed exam	  · Additional PE	Stage 4 pressure ulcer bilateral hips

## 2019-01-24 NOTE — PROGRESS NOTE ADULT - ASSESSMENT
85 y/o Female with PMH severe dementia, bedbound, pressure ulcers, nonverbal, HTN, HLD, DNR/DNI, recent admission for failure to thrive/debridement of ulcers/PEG tube placement 1 week ago presents with coffee ground debris in PEG tube.    #) # Coffee ground material from PEG tube and per mouth, likely from upper GI   - GI following - Rx PPI BID, c/w feeds  - s/p EGD on 1/24 - ulcer seen, clipped + epi'd  - restarting feeds today, will monitor H/H    #) Failure to thrive 2/2 Progressive /advanced Dementia  - family agreeable for hospice  - Hospice consult - pending    #) Infected Decubitus ulcer stage 4 on sacrum  - c/w Abx until hospice eval  - c/w wound care to sacrum    #) HTN - c/w amlodipine    #) DLD - c/w lipitor    #) Dementia - c/w memantine    #) Malnutrition - dietary evaluation ordered    Activity: OOBTC  DVT ppx: Heparin 5000mg SubQ   Code status: DNR/DNI  DISPO: pending - family wants placement

## 2019-01-25 LAB
ANION GAP SERPL CALC-SCNC: 15 MMOL/L — HIGH (ref 7–14)
BASOPHILS # BLD AUTO: 0.03 K/UL — SIGNIFICANT CHANGE UP (ref 0–0.2)
BASOPHILS NFR BLD AUTO: 0.4 % — SIGNIFICANT CHANGE UP (ref 0–1)
BLD GP AB SCN SERPL QL: SIGNIFICANT CHANGE UP
BUN SERPL-MCNC: 9 MG/DL — LOW (ref 10–20)
CALCIUM SERPL-MCNC: 6.9 MG/DL — LOW (ref 8.5–10.1)
CHLORIDE SERPL-SCNC: 111 MMOL/L — HIGH (ref 98–110)
CO2 SERPL-SCNC: 24 MMOL/L — SIGNIFICANT CHANGE UP (ref 17–32)
CREAT SERPL-MCNC: 0.8 MG/DL — SIGNIFICANT CHANGE UP (ref 0.7–1.5)
EOSINOPHIL # BLD AUTO: 0.24 K/UL — SIGNIFICANT CHANGE UP (ref 0–0.7)
EOSINOPHIL NFR BLD AUTO: 3.4 % — SIGNIFICANT CHANGE UP (ref 0–8)
GLUCOSE SERPL-MCNC: 327 MG/DL — HIGH (ref 70–99)
HCT VFR BLD CALC: 29.9 % — LOW (ref 37–47)
HGB BLD-MCNC: 9.6 G/DL — LOW (ref 12–16)
IMM GRANULOCYTES NFR BLD AUTO: 1 % — HIGH (ref 0.1–0.3)
LYMPHOCYTES # BLD AUTO: 1 K/UL — LOW (ref 1.2–3.4)
LYMPHOCYTES # BLD AUTO: 14 % — LOW (ref 20.5–51.1)
MAGNESIUM SERPL-MCNC: 1.8 MG/DL — SIGNIFICANT CHANGE UP (ref 1.8–2.4)
MCHC RBC-ENTMCNC: 25.4 PG — LOW (ref 27–31)
MCHC RBC-ENTMCNC: 32.1 G/DL — SIGNIFICANT CHANGE UP (ref 32–37)
MCV RBC AUTO: 79.1 FL — LOW (ref 81–99)
MONOCYTES # BLD AUTO: 0.39 K/UL — SIGNIFICANT CHANGE UP (ref 0.1–0.6)
MONOCYTES NFR BLD AUTO: 5.5 % — SIGNIFICANT CHANGE UP (ref 1.7–9.3)
NEUTROPHILS # BLD AUTO: 5.4 K/UL — SIGNIFICANT CHANGE UP (ref 1.4–6.5)
NEUTROPHILS NFR BLD AUTO: 75.7 % — HIGH (ref 42.2–75.2)
NRBC # BLD: 0 /100 WBCS — SIGNIFICANT CHANGE UP (ref 0–0)
PHOSPHATE SERPL-MCNC: 2.7 MG/DL — SIGNIFICANT CHANGE UP (ref 2.1–4.9)
PLATELET # BLD AUTO: 259 K/UL — SIGNIFICANT CHANGE UP (ref 130–400)
POTASSIUM SERPL-MCNC: 3.3 MMOL/L — LOW (ref 3.5–5)
POTASSIUM SERPL-SCNC: 3.3 MMOL/L — LOW (ref 3.5–5)
RBC # BLD: 3.78 M/UL — LOW (ref 4.2–5.4)
RBC # FLD: 18 % — HIGH (ref 11.5–14.5)
SODIUM SERPL-SCNC: 150 MMOL/L — HIGH (ref 135–146)
TYPE + AB SCN PNL BLD: SIGNIFICANT CHANGE UP
WBC # BLD: 7.13 K/UL — SIGNIFICANT CHANGE UP (ref 4.8–10.8)
WBC # FLD AUTO: 7.13 K/UL — SIGNIFICANT CHANGE UP (ref 4.8–10.8)

## 2019-01-25 RX ORDER — POTASSIUM CHLORIDE 20 MEQ
40 PACKET (EA) ORAL ONCE
Qty: 0 | Refills: 0 | Status: COMPLETED | OUTPATIENT
Start: 2019-01-25 | End: 2019-01-25

## 2019-01-25 RX ORDER — ATORVASTATIN CALCIUM 80 MG/1
20 TABLET, FILM COATED ORAL ONCE
Qty: 0 | Refills: 0 | Status: COMPLETED | OUTPATIENT
Start: 2019-01-25 | End: 2019-01-25

## 2019-01-25 RX ORDER — ATORVASTATIN CALCIUM 80 MG/1
20 TABLET, FILM COATED ORAL ONCE
Qty: 0 | Refills: 0 | Status: DISCONTINUED | OUTPATIENT
Start: 2019-01-25 | End: 2019-01-25

## 2019-01-25 RX ORDER — METRONIDAZOLE 500 MG
500 TABLET ORAL ONCE
Qty: 0 | Refills: 0 | Status: DISCONTINUED | OUTPATIENT
Start: 2019-01-25 | End: 2019-01-25

## 2019-01-25 RX ORDER — SODIUM,POTASSIUM PHOSPHATES 278-250MG
1 POWDER IN PACKET (EA) ORAL ONCE
Qty: 0 | Refills: 0 | Status: DISCONTINUED | OUTPATIENT
Start: 2019-01-25 | End: 2019-01-25

## 2019-01-25 RX ORDER — SODIUM,POTASSIUM PHOSPHATES 278-250MG
1 POWDER IN PACKET (EA) ORAL ONCE
Qty: 0 | Refills: 0 | Status: COMPLETED | OUTPATIENT
Start: 2019-01-25 | End: 2019-01-25

## 2019-01-25 RX ORDER — METRONIDAZOLE 500 MG
500 TABLET ORAL ONCE
Qty: 0 | Refills: 0 | Status: COMPLETED | OUTPATIENT
Start: 2019-01-25 | End: 2019-01-25

## 2019-01-25 RX ADMIN — PANTOPRAZOLE SODIUM 40 MILLIGRAM(S): 20 TABLET, DELAYED RELEASE ORAL at 17:02

## 2019-01-25 RX ADMIN — Medication 1 PACKET(S): at 06:02

## 2019-01-25 RX ADMIN — Medication 500 MILLIGRAM(S): at 01:28

## 2019-01-25 RX ADMIN — Medication 500 MILLIGRAM(S): at 11:55

## 2019-01-25 RX ADMIN — MEMANTINE HYDROCHLORIDE 10 MILLIGRAM(S): 10 TABLET ORAL at 11:55

## 2019-01-25 RX ADMIN — Medication 40 MILLIEQUIVALENT(S): at 11:56

## 2019-01-25 RX ADMIN — Medication 1 PACKET(S): at 23:26

## 2019-01-25 RX ADMIN — AMLODIPINE BESYLATE 10 MILLIGRAM(S): 2.5 TABLET ORAL at 06:02

## 2019-01-25 RX ADMIN — Medication 100 MILLIGRAM(S): at 06:03

## 2019-01-25 RX ADMIN — Medication 500 MILLIGRAM(S): at 16:06

## 2019-01-25 RX ADMIN — Medication 1 PACKET(S): at 01:28

## 2019-01-25 RX ADMIN — ATORVASTATIN CALCIUM 20 MILLIGRAM(S): 80 TABLET, FILM COATED ORAL at 01:28

## 2019-01-25 RX ADMIN — Medication 500 MILLIGRAM(S): at 08:14

## 2019-01-25 RX ADMIN — Medication 100 MILLIGRAM(S): at 17:02

## 2019-01-25 RX ADMIN — Medication 1 PACKET(S): at 14:17

## 2019-01-25 RX ADMIN — CHLORHEXIDINE GLUCONATE 1 APPLICATION(S): 213 SOLUTION TOPICAL at 06:01

## 2019-01-25 RX ADMIN — PANTOPRAZOLE SODIUM 40 MILLIGRAM(S): 20 TABLET, DELAYED RELEASE ORAL at 06:02

## 2019-01-25 RX ADMIN — Medication 100 MILLIGRAM(S): at 17:01

## 2019-01-25 RX ADMIN — ATORVASTATIN CALCIUM 20 MILLIGRAM(S): 80 TABLET, FILM COATED ORAL at 23:26

## 2019-01-25 NOTE — PROGRESS NOTE ADULT - ASSESSMENT
ASSESSMENT/PLAN  IMP:  - bleeding from new GT  - anemia  - hypokalemia/hypernatremia  - failure to thrive, progression of dementia with dysphagia  - sacral decubitus ulcer  WILL CHANGE G-TUBE FEED TO 360ML X3 FEED  WILL ADD CHRISTINA 1 PACKET Q12HR  check bmp/phos/mg and correct lytes

## 2019-01-25 NOTE — DISCHARGE NOTE ADULT - PATIENT PORTAL LINK FT
You can access the [facility] Patient Portal, offered by [facility], by registering with the following website: [email or web address]

## 2019-01-25 NOTE — PROGRESS NOTE ADULT - SUBJECTIVE AND OBJECTIVE BOX
JUAQUIN TEIXEIRA  84y  Female  ***My note supersedes ALL resident notes that I sign.  My corrections for their notes are in my note.***    I can be reached directly on [id number]. My office number is [phone]. My personal cell number is [phone].    INTERVAL EVENTS:    T(F): 98.1 (01-25-19 @ 05:09), Max: 98.6 (01-24-19 @ 15:56)  HR: 96 (01-25-19 @ 05:09) (81 - 96)  BP: 121/60 (01-25-19 @ 05:09) (108/52 - 125/59)  RR: 19 (01-25-19 @ 05:09) (16 - 19)  SpO2: 100% (01-24-19 @ 23:42) (100% - 100%)      01-24-19 @ 07:01  -  01-25-19 @ 07:00  --------------------------------------------------------  IN: 0 mL / OUT: 700 mL / NET: -700 mL          LABS:    RADIOLOGY & ADDITIONAL TESTS:      MEDICATIONS:  cefepime  Injectable. 1000 milliGRAM(s) IV Push every 12 hours  metroNIDAZOLE    Tablet 500 milliGRAM(s) Oral every 8 hours  vancomycin  IVPB 500 milliGRAM(s) IV Intermittent every 12 hours    amLODIPine   Tablet 10 milliGRAM(s) Oral daily  ascorbic acid 500 milliGRAM(s) Oral daily  atorvastatin 20 milliGRAM(s) Oral at bedtime  chlorhexidine 4% Liquid 1 Application(s) Topical <User Schedule>  docusate sodium Liquid 100 milliGRAM(s) Enteral Tube two times a day  memantine 10 milliGRAM(s) Oral daily  pantoprazole   Suspension 40 milliGRAM(s) Oral <User Schedule>  potassium chloride   Powder 40 milliEquivalent(s) Oral once  potassium phosphate / sodium phosphate powder 1 Packet(s) Oral three times a day JUAQUIN TEIXEIRA  84y  Female  ***My note supersedes ALL resident notes that I sign.  My corrections for their notes are in my note.***    I can be reached directly on [email or web address]. My office number is [phone]. My personal cell number is [phone].    INTERVAL EVENTS: Here for f/u of dementia. Pt non-verbal. Did not interact w/ me at all.     T(F): 98.1 (01-25-19 @ 05:09), Max: 98.6 (01-24-19 @ 15:56)  HR: 96 (01-25-19 @ 05:09) (81 - 96)  BP: 121/60 (01-25-19 @ 05:09) (108/52 - 125/59)  RR: 19 (01-25-19 @ 05:09) (16 - 19)  SpO2: 100% (01-24-19 @ 23:42) (100% - 100%)    01-24-19 @ 07:01  -  01-25-19 @ 07:00  --------------------------------------------------------  IN: 0 mL / OUT: 700 mL / NET: -700 mL    · Additional PE	Stage 4 pressure ulcer bilateral hips	  Neck:	No JVD	  · Extremities	No cyanosis, clubbing or edema	  · GI Normal	normal; soft; PEG tube present	  		  · Cardiovascular	Regular rate & rhythm, normal S1, S2; no murmurs, 	  · Respiratory	Breath Sounds equal & clear to percussion & auscultation, no accessory muscle use	  · Constitutional Details	cachectic	  neuro	non-verbal; keeps eyes closed	    LABS:                        9.6     (    79.1   7.13  )-----------( ---------      259      ( 25 Jan 2019 05:46 )             29.9    (    18.0     150   (   111   (   327      01-25-19 @ 05:46  ----------------------               3.3   (   24   (   9                             -----                        0.8  Ca  6.9   Mg  1.8    P   2.7     Culture - Urine (collected 01-22-19 @ 22:25)  Source: .Urine Clean Catch (Midstream)  Final Report (01-23-19 @ 23:19):    No growth    Culture - Blood (collected 01-22-19 @ 22:13)  Source: .Blood Blood-Peripheral  Preliminary Report (01-24-19 @ 02:02):    No growth to date.    Culture - Blood (collected 01-22-19 @ 22:13)  Source: .Blood Blood-Peripheral  Preliminary Report (01-24-19 @ 02:02):    No growth to date.    RADIOLOGY & ADDITIONAL TESTS:  < from: Xray Chest 1 View-PORTABLE IMMEDIATE (01.22.19 @ 22:43) >  Impression:      Left lower lobe opacity, follow-up suggested.    < end of copied text >    < from: [facility] Duplex Upper Ext Vein Scan, Left (01.15.19 @ 17:26) >  Impression:    No evidence of deep or superficial venous thrombosis in the left upper   extremity.    < end of copied text >      MEDICATIONS:  cefepime  Injectable. 1000 milliGRAM(s) IV Push every 12 hours  metroNIDAZOLE    Tablet 500 milliGRAM(s) Oral every 8 hours  vancomycin  IVPB 500 milliGRAM(s) IV Intermittent every 12 hours    amLODIPine   Tablet 10 milliGRAM(s) Oral daily  ascorbic acid 500 milliGRAM(s) Oral daily  atorvastatin 20 milliGRAM(s) Oral at bedtime  chlorhexidine 4% Liquid 1 Application(s) Topical <User Schedule>  docusate sodium Liquid 100 milliGRAM(s) Enteral Tube two times a day  memantine 10 milliGRAM(s) Oral daily  pantoprazole   Suspension 40 milliGRAM(s) Oral <User Schedule>  potassium chloride   Powder 40 milliEquivalent(s) Oral once  potassium phosphate / sodium phosphate powder 1 Packet(s) Oral three times a day

## 2019-01-25 NOTE — DISCHARGE NOTE ADULT - CARE PROVIDER_API CALL
Kiet Malagon), Gastroenterology; Internal Medicine  [address] [place] [address]  Phone: [phone]  Fax: [phone]

## 2019-01-25 NOTE — PROGRESS NOTE ADULT - ASSESSMENT
85 y/o Female with PMH severe dementia, bedbound, pressure ulcers, nonverbal, HTN, HLD, DNR/DNI, recent admission for failure to thrive/debridement of ulcers/PEG tube placement 1 week ago presents with coffee ground debris in PEG tube.    # Coffee ground material from PEG tube and per mouth, likely from upper GI   GI following - Rx PPI BID, c/w PEG feeds  s/p EGD on 1/24 - ulcer seen, clipped + epi'd  restarting feeds today, will monitor H/H    # Failure to thrive 2/2 advanced Dementia  family agreeable for hospice  Hospice consult - pending  would avoid further labs and studies    # Infected Decubitus ulcer stage 4 on sacrum  on vanco/cefepime/flagyl - will d/w hospice after their eval  c/w wound care to sacrum  will intervene further if going to do hospice - to d/w family    # HTN - c/w amlodipine    # DLD - on lipitor (can d/c this)    # Dementia - on memantine (can d/c this)    # Malnutrition - Hypernatremia  nutrition noted - follow Dr Hughes's rec  need to add free water to  cc q8, in addition to feeds and flushes of PEG  daily BMP for now until hospice    # DVT ppx: Heparin sc for now    #Code status: DNR/DNI    DISPO: hopsice eval for placement; poor overall prog

## 2019-01-25 NOTE — DISCHARGE NOTE ADULT - ADDITIONAL INSTRUCTIONS
Wound Care wound dressing as follows:  Right and left Trochanter Ulcer: please use Santyl and packing  2-3 daily  DTI sacrum: Xeroform and deshaun  times daily  DTI Right heeel: Foam Pads   Stage 3 Three ulcer inner left Knee: Xeroform and Kerlix 2-3x daily    Follow up with the gastroenterologist outpatient. Comfort care only.

## 2019-01-25 NOTE — DISCHARGE NOTE ADULT - HOSPITAL COURSE
83 y/o Female with PMH severe dementia, bedbound, pressure ulcers, nonverbal, HTN, HLD, DNR/DNI, recent admission for failure to thrive/debridement of ulcers/PEG tube placement 1 week ago presents with coffee ground debris in PEG tube.    #) # Coffee ground material from PEG tube and per mouth, likely from upper GI   - GI following - Rx PPI BID, c/w feeds  - s/p EGD on 1/24 - ulcer seen, clipped + epi'd  - restarting feeds today, will monitor H/H    #) Failure to thrive 2/2 Progressive /advanced Dementia  - family agreeable for hospice  - Hospice consult - pending    #) Infected Decubitus ulcer stage 4 on sacrum  - c/w Abx until hospice eval  - c/w wound care to sacrum    #) HTN - c/w amlodipine    #) DLD - c/w lipitor    #) Dementia - c/w memantine    #) Malnutrition - dietary evaluation ordered    Activity: OOBTC  DVT ppx: Heparin 5000mg SubQ   Code status: DNR/DNI  DISPO: pending - family wants placement

## 2019-01-25 NOTE — DISCHARGE NOTE ADULT - MEDICATION SUMMARY - MEDICATIONS TO TAKE
I will START or STAY ON the medications listed below when I get home from the hospital:    Aspirin Enteric Coated 81 mg oral delayed release tablet  -- 1 tab(s) by mouth once a day  -- Indication: For Heart    rosuvastatin 20 mg oral tablet  -- 1 tab(s) by mouth once a day (at bedtime)  -- Indication: For Heart    amLODIPine 10 mg oral tablet  -- 1 tab(s) by mouth once a day  -- Indication: For Heart    Exelon 4.6 mg/24 hr transdermal film, extended release  -- 1 patch by transdermal patch once a day  -- Indication: For Heart    docusate sodium 100 mg oral tablet  -- 1 tab(s) by mouth 2 times a day  -- Indication: For Constipation     memantine 10 mg oral tablet  -- 1 tab(s) by mouth once a day  -- Indication: For Dementia    Zyvox 600 mg oral tablet  -- 1 tab(s) by mouth 2 times a day   -- Avoid chocolate, wine and cheese while taking this medication.  Finish all this medication unless otherwise directed by prescriber.  Obtain medical advice before taking any non-prescription drugs as some may affect the action of this medication.    -- Indication: For Infection    levoFLOXacin 500 mg oral tablet  -- 1 tab(s) by mouth every 24 hours  -- Indication: For Infection    ascorbic acid 500 mg oral tablet  -- 1 tab(s) by mouth once a day  -- Indication: For Supplement I will START or STAY ON the medications listed below when I get home from the hospital:    amLODIPine 10 mg oral tablet  -- 1 tab(s) by mouth once a day  -- Indication: For Heart    docusate sodium 100 mg oral tablet  -- 1 tab(s) by mouth 2 times a day  -- Indication: For Constipation

## 2019-01-25 NOTE — PROGRESS NOTE ADULT - SUBJECTIVE AND OBJECTIVE BOX
24H events:    Patient is a 84y old Female who presents with a chief complaint of Fever, coffee ground substance from peg (25 Jan 2019 13:09)    Primary diagnosis of Gastrointestinal hemorrhage associated with gastritis, unspecified gastritis type     Today is hospital day 2d. Hospice intake nurse was contacted. Awaiting rec's from hospice. Awaiting family discussion regarding GOC.     PAST MEDICAL & SURGICAL HISTORY  Bedsore  Pressure ulcer  Bedbound  Nonverbal  Arthritis  High cholesterol  Dementia  HTN (hypertension)  History of surgery: peg tube placement  S/P appendectomy    ALLERGIES:  penicillin (Unknown)    MEDICATIONS:  STANDING MEDICATIONS  amLODIPine   Tablet 10 milliGRAM(s) Oral daily  ascorbic acid 500 milliGRAM(s) Oral daily  atorvastatin 20 milliGRAM(s) Oral at bedtime  cefepime  Injectable. 1000 milliGRAM(s) IV Push every 12 hours  chlorhexidine 4% Liquid 1 Application(s) Topical <User Schedule>  docusate sodium Liquid 100 milliGRAM(s) Enteral Tube two times a day  memantine 10 milliGRAM(s) Oral daily  metroNIDAZOLE    Tablet 500 milliGRAM(s) Oral every 8 hours  pantoprazole   Suspension 40 milliGRAM(s) Oral <User Schedule>  potassium phosphate / sodium phosphate powder 1 Packet(s) Oral three times a day  vancomycin  IVPB 500 milliGRAM(s) IV Intermittent every 12 hours    PRN MEDICATIONS    VITALS:   T(F): 97.4  HR: 103  BP: 135/57  RR: 18  SpO2: 100%    LABS:                        9.6    7.13  )-----------( 259      ( 25 Jan 2019 05:46 )             29.9     01-25    150<H>  |  111<H>  |  9<L>  ----------------------------<  327<H>  3.3<L>   |  24  |  0.8    Ca    6.9<L>      25 Jan 2019 05:46  Phos  2.7     01-25  Mg     1.8     01-25                Culture - Urine (collected 22 Jan 2019 22:25)  Source: .Urine Clean Catch (Midstream)  Final Report (23 Jan 2019 23:19):    No growth    Culture - Blood (collected 22 Jan 2019 22:13)  Source: .Blood Blood-Peripheral  Preliminary Report (24 Jan 2019 02:02):    No growth to date.    Culture - Blood (collected 22 Jan 2019 22:13)  Source: .Blood Blood-Peripheral  Preliminary Report (24 Jan 2019 02:02):    No growth to date.          RADIOLOGY:    PHYSICAL EXAM:  GEN: No acute distress  LUNGS: Clear to auscultation bilaterally   HEART: S1/S2 present. RRR.   ABD: Soft, non-tender, non-distended. Bowel sounds present  EXT: No edema  NEURO: AAOX0      A/P    85 y/o Female with PMH severe dementia, bedbound, pressure ulcers, nonverbal, HTN, HLD, DNR/DNI, recent admission for failure to thrive/debridement of ulcers/PEG tube placement 1 week ago presents with coffee ground debris in PEG tube.    # Coffee ground material from PEG tube and per mouth, likely from upper GI   GI following - Rx PPI BID, c/w PEG feeds  s/p EGD on 1/24 - ulcer seen, clipped + epi'd  restarting feeds today, will monitor H/H    # Failure to thrive 2/2 advanced Dementia  family agreeable for hospice  Hospice consult - pending  would avoid further labs and studies    # Infected Decubitus ulcer stage 4 on sacrum  on vanco/cefepime/flagyl - will d/w hospice after their eval  c/w wound care to sacrum  will intervene further if going to do hospice - to d/w family    # HTN - c/w amlodipine    # DLD - on lipitor (can d/c this)    # Dementia - on memantine (can d/c this)    # Malnutrition - Hypernatremia  nutrition noted - follow Dr Hughes's rec  need to add free water to  cc q8, in addition to feeds and flushes of PEG  daily BMP for now until hospice    # DVT ppx: Heparin sc for now    #Code status: DNR/DNI    DISPO: hopsice eval for placement; poor overall prog

## 2019-01-25 NOTE — DISCHARGE NOTE ADULT - CARE PLAN
Principal Discharge DX:	Gastrointestinal hemorrhage associated with gastritis, unspecified gastritis type  Goal:	Follow up with GI outpatient  Assessment and plan of treatment:	Follow up with GI outpatient

## 2019-01-25 NOTE — DISCHARGE NOTE ADULT - MEDICATION SUMMARY - MEDICATIONS TO STOP TAKING
I will STOP taking the medications listed below when I get home from the hospital:  None I will STOP taking the medications listed below when I get home from the hospital:    rosuvastatin 20 mg oral tablet  -- 1 tab(s) by mouth once a day (at bedtime)    memantine 10 mg oral tablet  -- 1 tab(s) by mouth once a day    Aspirin Enteric Coated 81 mg oral delayed release tablet  -- 1 tab(s) by mouth once a day    amLODIPine 10 mg oral tablet  -- 1 tab(s) by mouth once a day    Zyvox 600 mg oral tablet  -- 1 tab(s) by mouth 2 times a day   -- Avoid chocolate, wine and cheese while taking this medication.  Finish all this medication unless otherwise directed by prescriber.  Obtain medical advice before taking any non-prescription drugs as some may affect the action of this medication.    metroNIDAZOLE 500 mg oral tablet  -- 1 tab(s) by mouth every 8 hours    levoFLOXacin 500 mg oral tablet  -- 1 tab(s) by mouth every 24 hours    ascorbic acid 500 mg oral tablet  -- 1 tab(s) by mouth once a day

## 2019-01-25 NOTE — PROGRESS NOTE ADULT - SUBJECTIVE AND OBJECTIVE BOX
Patient is a 84y old  Female who presents with a chief complaint of Fever, coffee ground substance from peg (25 Jan 2019 13:09)  pt seen and evaluated resting comfortably  on PEG tube feed  hypernatremia/hypokalemia    ICU Vital Signs Last 24 Hrs  T(C): 36.3 (25 Jan 2019 14:44), Max: 37 (24 Jan 2019 15:56)  T(F): 97.4 (25 Jan 2019 14:44), Max: 98.6 (24 Jan 2019 15:56)  HR: 103 (25 Jan 2019 14:44) (81 - 103)  BP: 135/57 (25 Jan 2019 14:44) (108/52 - 135/57)  RR: 18 (25 Jan 2019 14:44) (16 - 19)  SpO2: 100% (24 Jan 2019 23:42) (100% - 100%)    Drug Dosing Weight  Height (cm): 162.56 (16 Jan 2019 14:55)  Weight (kg): 53.7 (24 Jan 2019 23:16)  BMI (kg/m2): 20.3 (24 Jan 2019 23:16)  BSA (m2): 1.57 (24 Jan 2019 23:16)    I&O's Detail    24 Jan 2019 07:01  -  25 Jan 2019 07:00  --------------------------------------------------------  IN:    dextrose 5% + sodium chloride 0.9%: 825 mL    Enteral Tube Flush: 240 mL    ns in tub fed  [id number]: 240 mL  Total IN: 1305 mL    OUT:    Indwelling Catheter - Urethral: 700 mL  Total OUT: 700 mL    Total NET: 605 mL    PHYSICAL EXAM:  Constitutional: pt is resting comfortably   nasal cannula in place  Gastrointestinal: peg tube in place site c/d/i  abdomen soft  Skin:+ stage 4 decubiti  MEDICATIONS  (STANDING):  amLODIPine   Tablet 10 milliGRAM(s) Oral daily  ascorbic acid 500 milliGRAM(s) Oral daily  atorvastatin 20 milliGRAM(s) Oral at bedtime  cefepime  Injectable. 1000 milliGRAM(s) IV Push every 12 hours  chlorhexidine 4% Liquid 1 Application(s) Topical <User Schedule>  docusate sodium Liquid 100 milliGRAM(s) Enteral Tube two times a day  memantine 10 milliGRAM(s) Oral daily  metroNIDAZOLE    Tablet 500 milliGRAM(s) Oral every 8 hours  pantoprazole   Suspension 40 milliGRAM(s) Oral <User Schedule>  potassium phosphate / sodium phosphate powder 1 Packet(s) Oral three times a day  vancomycin  IVPB 500 milliGRAM(s) IV Intermittent every 12 hours      Diet, NPO with Tube Feed:   Tube Feeding Modality: Gastrostomy  Jevity 1.2 Brian  Total Volume for 24 Hours (mL): 1080  Bolus   Total Volume of Bolus (mL):  360  Bolus Feed Rate (mL per Hour): 0   Bolus Feed Duration (in Hours): 0.5  Tube Feed Frequency: Every 8 hours   Tube Feed Start Time: 15:00 (01-25-19 @ 11:54)      LABS  01-25    150<H>  |  111<H>  |  9<L>  ----------------------------<  327<H>  3.3<L>   |  24  |  0.8    Ca    6.9<L>      25 Jan 2019 05:46  Phos  2.7     01-25  Mg     1.8     01-25                          9.6    7.13  )-----------( 259      ( 25 Jan 2019 05:46 )             29.9

## 2019-01-26 RX ORDER — CEFEPIME 1 G/1
INJECTION, POWDER, FOR SOLUTION INTRAMUSCULAR; INTRAVENOUS
Qty: 0 | Refills: 0 | Status: DISCONTINUED | OUTPATIENT
Start: 2019-01-26 | End: 2019-01-26

## 2019-01-26 RX ORDER — CEFEPIME 1 G/1
1000 INJECTION, POWDER, FOR SOLUTION INTRAMUSCULAR; INTRAVENOUS EVERY 12 HOURS
Qty: 0 | Refills: 0 | Status: DISCONTINUED | OUTPATIENT
Start: 2019-01-26 | End: 2019-01-26

## 2019-01-26 RX ORDER — CEFEPIME 1 G/1
1000 INJECTION, POWDER, FOR SOLUTION INTRAMUSCULAR; INTRAVENOUS ONCE
Qty: 0 | Refills: 0 | Status: COMPLETED | OUTPATIENT
Start: 2019-01-26 | End: 2019-01-26

## 2019-01-26 RX ADMIN — Medication 1 PACKET(S): at 22:52

## 2019-01-26 RX ADMIN — Medication 1 PACKET(S): at 13:19

## 2019-01-26 RX ADMIN — PANTOPRAZOLE SODIUM 40 MILLIGRAM(S): 20 TABLET, DELAYED RELEASE ORAL at 18:09

## 2019-01-26 RX ADMIN — Medication 100 MILLIGRAM(S): at 18:09

## 2019-01-26 RX ADMIN — Medication 100 MILLIGRAM(S): at 06:56

## 2019-01-26 RX ADMIN — Medication 500 MILLIGRAM(S): at 01:18

## 2019-01-26 RX ADMIN — CHLORHEXIDINE GLUCONATE 1 APPLICATION(S): 213 SOLUTION TOPICAL at 06:49

## 2019-01-26 RX ADMIN — Medication 1 PACKET(S): at 06:57

## 2019-01-26 RX ADMIN — Medication 1 APPLICATION(S): at 13:19

## 2019-01-26 RX ADMIN — PANTOPRAZOLE SODIUM 40 MILLIGRAM(S): 20 TABLET, DELAYED RELEASE ORAL at 06:56

## 2019-01-26 RX ADMIN — AMLODIPINE BESYLATE 10 MILLIGRAM(S): 2.5 TABLET ORAL at 06:56

## 2019-01-26 RX ADMIN — Medication 500 MILLIGRAM(S): at 09:32

## 2019-01-26 NOTE — PROGRESS NOTE ADULT - ASSESSMENT
83 y/o Female with PMH severe dementia, bedbound, pressure ulcers, nonverbal, HTN, HLD, DNR/DNI, recent admission for failure to thrive/debridement of ulcers/PEG tube placement 1 week ago presents with coffee ground debris in PEG tube.    # Coffee ground material from PEG tube and per mouth, likely from upper GI   GI following - Rx PPI BID, c/w PEG feeds  s/p EGD on 1/24 - ulcer seen, clipped + epi'd  restarting feeds      # Failure to thrive 2/2 very advanced dementia - non-verbal  family agreeable for hospice  Hospice consult - pending  would avoid further labs and studies    # decubitus ulcers stage 4 on sacrum and knee, DTi rt heel  d/c abx  SSD Cr and W->D dressing and ABD pads w/ tap q24 for buttocks and knee wounds  Aluvyn and heel booties for both heels  no further intervention surgically    # HTN - c/w amlodipine    # DLD - on lipitor (can d/c this)    # Dementia - on memantine (can d/c this)    # Malnutrition - Hypernatremia  nutrition noted - follow Dr Hughes's rec  need to add free water to  cc q8, in addition to feeds and flushes of PEG  no need to monitor    # DVT ppx: Heparin sc for now    #Code status: DNR/DNI    DISPO: hopsice eval for placement; poor overall prog

## 2019-01-26 NOTE — PROGRESS NOTE ADULT - SUBJECTIVE AND OBJECTIVE BOX
24H events:    Spoke to the family in great length. They are interested in comfort care only.     PAST MEDICAL & SURGICAL HISTORY  Bedsore  Pressure ulcer  Bedbound  Nonverbal  Arthritis  High cholesterol  Dementia  HTN (hypertension)  History of surgery: peg tube placement  S/P appendectomy    ALLERGIES:  penicillin (Unknown)    MEDICATIONS:  STANDING MEDICATIONS  amLODIPine   Tablet 10 milliGRAM(s) Oral daily  chlorhexidine 4% Liquid 1 Application(s) Topical <User Schedule>  docusate sodium Liquid 100 milliGRAM(s) Enteral Tube two times a day  pantoprazole   Suspension 40 milliGRAM(s) Oral <User Schedule>  potassium phosphate / sodium phosphate powder 1 Packet(s) Oral three times a day  silver sulfADIAZINE 1% Cream 1 Application(s) Topical daily    PRN MEDICATIONS    VITALS:   T(F): 97.4  HR: 95  BP: 125/65  RR: 18  SpO2: 98%    LABS:                        9.6    7.13  )-----------( 259      ( 25 Jan 2019 05:46 )             29.9     01-25    150<H>  |  111<H>  |  9<L>  ----------------------------<  327<H>  3.3<L>   |  24  |  0.8    Ca    6.9<L>      25 Jan 2019 05:46  Phos  2.7     01-25  Mg     1.8     01-25                    RADIOLOGY:    PHYSICAL EXAM:  GEN: No acute distress  LUNGS: Clear to auscultation bilaterally   HEART: S1/S2 present. RRR.   ABD: Soft, non-tender, non-distended. Bowel sounds present  EXT: No edema  NEURO: AAOX3      · Assessment		  Pt on comfort care only , no labs or vitals     # Coffee ground material from PEG tube and per mouth, likely from upper GI   GI following - Rx PPI BID, c/w PEG feeds  s/p EGD on 1/24 - ulcer seen, clipped + epi'd  Tolerating feeds    # Failure to thrive 2/2 very advanced dementia - non-verbal  family agreeable for hospice  Hospice consult - pending  would avoid further labs and studies    # decubitus ulcers stage 4 on sacrum and knee, DTi rt heel  d/c abx  SSD Cr and W->D dressing and ABD pads w/ tap q24 for buttocks and knee wounds  Aluvyn and heel booties for both heels  no further intervention surgically      #Code status: DNR/DNI    DISPO: awaiting home hospice

## 2019-01-26 NOTE — PROGRESS NOTE ADULT - SUBJECTIVE AND OBJECTIVE BOX
JUAQUIN TEIXEIRA  84y  Female  ***My note supersedes ALL resident notes that I sign.  My corrections for their notes are in my note.***    I can be reached directly on [id number]. My office number is [phone]. My personal cell number is [phone].    INTERVAL EVENTS: Here for f/u of dementia. No change. Non-verbal. No visitors.    T(F): 97.4 (01-26-19 @ 04:10), Max: 97.6 (01-25-19 @ 20:13)  HR: 95 (01-26-19 @ 04:10) (95 - 103)  BP: 125/65 (01-26-19 @ 04:10) (119/56 - 135/57)  RR: 18 (01-26-19 @ 04:10) (18 - 18)  SpO2: 98% (01-26-19 @ 08:23) (98% - 98%)    01-25-19 @ 07:01  -  01-26-19 @ 07:00  --------------------------------------------------------  IN: 0 mL / OUT: 1100 mL / NET: -1100 mL    neuro	non-verbal; keeps eyes closed	  · Constitutional Details	cachectic	  · Respiratory	Breath Sounds equal & clear to percussion & auscultation, no accessory muscle use	  · Cardiovascular	Regular rate & rhythm, normal S1, S2; no murmurs, 	  · GI Normal	normal; soft; PEG tube present	  · Extremities	No cyanosis, clubbing or edema	  Neck:	No JVD	  		  b/l buttocks w/ st 4 ulcers, not infected, no black necrosis, dry bases  lt knee - pressure ulcer from contractions of legs - stg 4, not infected, minor necrotic tissue  rt heel - DTI  lt heel - fairly nl    LABS:                        9.6     (    79.1   7.13  )-----------( ---------      259      ( 25 Jan 2019 05:46 )             29.9    (    18.0     Culture - Urine (collected 01-22-19 @ 22:25)  Source: .Urine Clean Catch (Midstream)  Final Report (01-23-19 @ 23:19):    No growth    Culture - Blood (collected 01-22-19 @ 22:13)  Source: .Blood Blood-Peripheral  Preliminary Report (01-24-19 @ 02:02):    No growth to date.    Culture - Blood (collected 01-22-19 @ 22:13)  Source: .Blood Blood-Peripheral  Preliminary Report (01-24-19 @ 02:02):    No growth to date.    RADIOLOGY & ADDITIONAL TESTS:      MEDICATIONS:    amLODIPine   Tablet 10 milliGRAM(s) Oral daily  ascorbic acid 500 milliGRAM(s) Oral daily  atorvastatin 20 milliGRAM(s) Oral at bedtime  chlorhexidine 4% Liquid 1 Application(s) Topical <User Schedule>  docusate sodium Liquid 100 milliGRAM(s) Enteral Tube two times a day  memantine 10 milliGRAM(s) Oral daily  pantoprazole   Suspension 40 milliGRAM(s) Oral <User Schedule>  potassium phosphate / sodium phosphate powder 1 Packet(s) Oral three times a day  silver sulfADIAZINE 1% Cream 1 Application(s) Topical daily

## 2019-01-27 RX ADMIN — AMLODIPINE BESYLATE 10 MILLIGRAM(S): 2.5 TABLET ORAL at 05:47

## 2019-01-27 RX ADMIN — CHLORHEXIDINE GLUCONATE 1 APPLICATION(S): 213 SOLUTION TOPICAL at 05:47

## 2019-01-27 RX ADMIN — PANTOPRAZOLE SODIUM 40 MILLIGRAM(S): 20 TABLET, DELAYED RELEASE ORAL at 05:47

## 2019-01-27 RX ADMIN — Medication 1 PACKET(S): at 13:54

## 2019-01-27 RX ADMIN — Medication 100 MILLIGRAM(S): at 17:17

## 2019-01-27 RX ADMIN — Medication 1 PACKET(S): at 05:47

## 2019-01-27 RX ADMIN — PANTOPRAZOLE SODIUM 40 MILLIGRAM(S): 20 TABLET, DELAYED RELEASE ORAL at 17:17

## 2019-01-27 RX ADMIN — Medication 1 PACKET(S): at 22:57

## 2019-01-27 RX ADMIN — Medication 100 MILLIGRAM(S): at 05:47

## 2019-01-27 RX ADMIN — Medication 1 APPLICATION(S): at 12:32

## 2019-01-27 NOTE — PROGRESS NOTE ADULT - ASSESSMENT
85 y/o Female with PMH severe dementia, bedbound, pressure ulcers, nonverbal, HTN, HLD, DNR/DNI, recent admission for failure to thrive/debridement of ulcers/PEG tube placement 1 week ago presents with coffee ground debris in PEG tube.    # Coffee ground material from PEG tube and per mouth, likely from upper GI - now stopped  GI following - Rx PPI BID, c/w PEG feeds  s/p EGD on 1/24 - ulcer seen, clipped + epi'd  restarting feeds      # Failure to thrive 2/2 very advanced dementia - non-verbal  family agreeable for hospice  Hospice consult - pending  would avoid further labs and studies    # decubitus ulcers stage 4 on sacrum and knee, DTI rt heel  d/c abx  SSD Cr and W->D dressing and ABD pads w/ tap q24 for buttocks and knee wounds  Aluvyn and heel booties for both heels  no further intervention surgically    # HTN - c/w amlodipine    # DLD - on lipitor (can d/c this)    # Dementia - on memantine (can d/c this)    # Malnutrition - Hypernatremia  nutrition noted - follow Dr Hughes's rec  need to add free water to  cc q8, in addition to feeds and flushes of PEG  no need to monitor    # DVT ppx: Heparin sc for now    #Code status: DNR/DNI    DISPO: hopsice noé for placement; poor overall prog

## 2019-01-27 NOTE — PROGRESS NOTE ADULT - SUBJECTIVE AND OBJECTIVE BOX
JUAQUIN TEIXEIRA  84y  Female  ***My note supersedes ALL resident notes that I sign.  My corrections for their notes are in my note.***    I can be reached directly on [id number]. My office number is [phone]. My personal cell number is [phone].    INTERVAL EVENTS: here for f/u of dementia. Non-verbal. No acute events per RN.    T(F): 98.5 (01-27-19 @ 04:51), Max: 98.5 (01-27-19 @ 04:51)  HR: 100 (01-27-19 @ 04:51) (92 - 100)  BP: 108/59 (01-27-19 @ 04:51) (108/59 - 124/66)  RR: 20 (01-27-19 @ 04:51) (20 - 20)  SpO2: 100% (01-27-19 @ 08:21) (98% - 100%)    01-26-19 @ 07:01  -  01-27-19 @ 07:00  --------------------------------------------------------  IN: 0 mL / OUT: 1195 mL / NET: -1195 mL    neuro	non-verbal; keeps eyes closed	  · Constitutional Details	cachectic	  · Respiratory	Breath Sounds equal & clear to percussion & auscultation, no accessory muscle use	  · Cardiovascular	Regular rate & rhythm, normal S1, S2; no murmurs, 	  · GI Normal	normal; soft; PEG tube present	  · Extremities	No cyanosis, clubbing or edema	  Neck:	No JVD	  		  b/l buttocks w/ st 4 ulcers, not infected, no black necrosis, dry bases  lt knee - pressure ulcer from contractions of legs - stg 4, not infected, minor necrotic tissue  rt heel - DTI  lt heel - fairly nl    LABS:    RADIOLOGY & ADDITIONAL TESTS:      MEDICATIONS:    amLODIPine   Tablet 10 milliGRAM(s) Oral daily  chlorhexidine 4% Liquid 1 Application(s) Topical <User Schedule>  docusate sodium Liquid 100 milliGRAM(s) Enteral Tube two times a day  pantoprazole   Suspension 40 milliGRAM(s) Oral <User Schedule>  potassium phosphate / sodium phosphate powder 1 Packet(s) Oral three times a day  silver sulfADIAZINE 1% Cream 1 Application(s) Topical daily

## 2019-01-28 LAB
CULTURE RESULTS: SIGNIFICANT CHANGE UP
CULTURE RESULTS: SIGNIFICANT CHANGE UP
SPECIMEN SOURCE: SIGNIFICANT CHANGE UP
SPECIMEN SOURCE: SIGNIFICANT CHANGE UP

## 2019-01-28 RX ADMIN — CHLORHEXIDINE GLUCONATE 1 APPLICATION(S): 213 SOLUTION TOPICAL at 06:06

## 2019-01-28 RX ADMIN — Medication 1 PACKET(S): at 13:37

## 2019-01-28 RX ADMIN — Medication 1 PACKET(S): at 06:06

## 2019-01-28 RX ADMIN — Medication 100 MILLIGRAM(S): at 06:05

## 2019-01-28 RX ADMIN — Medication 1 PACKET(S): at 22:35

## 2019-01-28 RX ADMIN — PANTOPRAZOLE SODIUM 40 MILLIGRAM(S): 20 TABLET, DELAYED RELEASE ORAL at 17:39

## 2019-01-28 RX ADMIN — AMLODIPINE BESYLATE 10 MILLIGRAM(S): 2.5 TABLET ORAL at 06:05

## 2019-01-28 RX ADMIN — PANTOPRAZOLE SODIUM 40 MILLIGRAM(S): 20 TABLET, DELAYED RELEASE ORAL at 06:03

## 2019-01-28 RX ADMIN — Medication 1 APPLICATION(S): at 11:48

## 2019-01-28 NOTE — PROGRESS NOTE ADULT - ASSESSMENT
83 y/o Female with PMH severe dementia, bedbound, pressure ulcers, nonverbal, HTN, HLD, DNR/DNI, recent admission for failure to thrive/debridement of ulcers/PEG tube placement 1 week ago presents with coffee ground debris in PEG tube.    # Coffee ground material from PEG tube and per mouth, likely from upper GI - now stopped  GI noted - Rx PPI BID, c/w PEG feeds  s/p EGD on 1/24 - ulcer seen, clipped + epi'd    # Failure to thrive 2/2 very advanced dementia - non-verbal  feeds adjusted by nutrition   family agreeable for hospice - f/u  would avoid further labs and studies    # decubitus pressure ulcers stage 4 on sacrum and knee, DTI rt heel - these were all present on admission  d/c abx  SSD Cr and W->D dressing and ABD pads w/ tap q24 for buttocks and knee wounds  Aluvyn and heel booties for both heels  no further intervention surgically - hospice    # HTN - c/w amlodipine    # DLD - on lipitor (can d/c this)    # Dementia - on memantine (can d/c this)    # Malnutrition - Hypernatremia  nutrition noted - follow Dr Hughes's rec  free water to  cc q8, in addition to feeds and flushes of PEG  no need to monitor    # DVT ppx: Heparin sc for now    #Code status: DNR/DNI    DISPO: [facility] for placement; poor overall prog

## 2019-01-28 NOTE — PROGRESS NOTE ADULT - SUBJECTIVE AND OBJECTIVE BOX
Patient is a 84y old  Female who presents with a chief complaint of Fever, coffee ground substance from peg (28 Jan 2019 13:26)  pt seen and evaluated resting in bed comfortably  family at bedside  pt is for poss. HOME Hospice    ICU Vital Signs Last 24 Hrs  T(C): 37.1 (28 Jan 2019 14:57), Max: 37.1 (28 Jan 2019 14:57)  T(F): 98.8 (28 Jan 2019 14:57), Max: 98.8 (28 Jan 2019 14:57)  HR: 91 (28 Jan 2019 04:42) (91 - 91)  BP: 129/58 (28 Jan 2019 04:42) (129/58 - 129/58)  RR: 16 (28 Jan 2019 14:57) (16 - 18)  SpO2: 94% (28 Jan 2019 07:46) (94% - 94%)      Drug Dosing Weight  Height (cm): 162.56 (16 Jan 2019 14:55)  Weight (kg): 53.7 (24 Jan 2019 23:16)  BMI (kg/m2): 20.3 (24 Jan 2019 23:16)  BSA (m2): 1.57 (24 Jan 2019 23:16)    I&O's Detail    27 Jan 2019 07:01  -  28 Jan 2019 07:00  --------------------------------------------------------  IN:    Enteral Tube Flush: 200 mL    Free Water: 480 mL    ns in tub fed  [id number]: 720 mL  Total IN: 1400 mL    OUT:    Indwelling Catheter - Urethral: 1550 mL  Total OUT: 1550 mL    Total NET: -150 mL      28 Jan 2019 07:01  -  28 Jan 2019 15:12  --------------------------------------------------------  IN:    Enteral Tube Flush: 100 mL    Free Water: 240 mL    ns in tub fed  [id number]: 360 mL  Total IN: 700 mL    OUT:  Total OUT: 0 mL    Total NET: 700 mL     PHYSICAL EXAM:  Constitutional: pt is resting comfortably  Gastrointestinal: soft n/d peg tube in place site c/d/i  MEDICATIONS  (STANDING):  amLODIPine   Tablet 10 milliGRAM(s) Oral daily  chlorhexidine 4% Liquid 1 Application(s) Topical <User Schedule>  docusate sodium Liquid 100 milliGRAM(s) Enteral Tube two times a day  pantoprazole   Suspension 40 milliGRAM(s) Oral <User Schedule>  potassium phosphate / sodium phosphate powder 1 Packet(s) Oral three times a day  silver sulfADIAZINE 1% Cream 1 Application(s) Topical daily      Diet, NPO with Tube Feed:   Tube Feeding Modality: Gastrostomy  Jevity 1.2 Brian  Total Volume for 24 Hours (mL): 1080  Bolus   Total Volume of Bolus (mL):  360  Bolus Feed Rate (mL per Hour): 0   Bolus Feed Duration (in Hours): 0.5  Tube Feed Frequency: Every 8 hours   Tube Feed Start Time: 15:00  Maximilian(7 Gm Arginine/7 Gm Glut/1.2 Gm HMB     Qty per Day:  2 (01-25-19 @ 15:20)

## 2019-01-28 NOTE — PROGRESS NOTE ADULT - SUBJECTIVE AND OBJECTIVE BOX
24H events:    Comfort care only    PAST MEDICAL & SURGICAL HISTORY  Bedsore  Pressure ulcer  Bedbound  Nonverbal  Arthritis  High cholesterol  Dementia  HTN (hypertension)  History of surgery: peg tube placement  S/P appendectomy    ALLERGIES:  penicillin (Unknown)    MEDICATIONS:  STANDING MEDICATIONS  amLODIPine   Tablet 10 milliGRAM(s) Oral daily  chlorhexidine 4% Liquid 1 Application(s) Topical <User Schedule>  docusate sodium Liquid 100 milliGRAM(s) Enteral Tube two times a day  pantoprazole   Suspension 40 milliGRAM(s) Oral <User Schedule>  potassium phosphate / sodium phosphate powder 1 Packet(s) Oral three times a day  silver sulfADIAZINE 1% Cream 1 Application(s) Topical daily    PRN MEDICATIONS    VITALS:   T(F): 98.6  HR: 91  BP: 129/58  RR: 18  SpO2: 94%    LABS:                        RADIOLOGY:      A/P  Comfort care only  No labs or vitals  F/U hospice and palliative rec's

## 2019-01-28 NOTE — PROGRESS NOTE ADULT - ASSESSMENT
ASSESSMENT/PLAN  - bleeding from new GT  - anemia  - hypokalemia/hypernatremia  - failure to thrive, progression of dementia with dysphagia  - sacral decubitus ulcer  Poss. HOME HOSPICE D/C  continue with  G-TUBE FEED TO 360ML X3 FEED  and  CHRISTINA 1 PACKET Q12HR  check bmp/phos/mg and correct lytes

## 2019-01-28 NOTE — PROGRESS NOTE ADULT - SUBJECTIVE AND OBJECTIVE BOX
JUAQUIN TEIXEIRA  84y  Female  ***My note supersedes ALL resident notes that I sign.  My corrections for their notes are in my note.***    I can be reached directly on [employer] [id number]. My office number is [phone]. My personal cell number is [phone].    INTERVAL EVENTS: Here for f/u of dementia. Pt stable. Non-verbal. Does moan if try to move legs, which are severely rigid and contracted. Tolerates feeds. No further bleeding reported.    T(F): 98.6 (01-28-19 @ 04:42), Max: 98.6 (01-28-19 @ 04:42)  HR: 91 (01-28-19 @ 04:42) (91 - 91)  BP: 129/58 (01-28-19 @ 04:42) (129/58 - 129/58)  RR: 18 (01-28-19 @ 04:42) (18 - 18)  SpO2: 94% (01-28-19 @ 07:46) (93% - 100%)    01-27-19 @ 07:01  -  01-28-19 @ 07:00  --------------------------------------------------------  IN: 0 mL / OUT: 1550 mL / NET: -1550 mL    neuro	non-verbal; eyes open today; PERRL	  · Constitutional Details	cachectic	  · Respiratory	CTA b/luse	  · Cardiovascular	Regular rate & rhythm, normal S1, S2; no murmurs, 	  · GI Normal	normal; soft; PEG tube present	  · Extremities	No cyanosis, clubbing or edema	  Neck:	No JVD	  		  b/l buttocks w/ st 4 ulcers, not infected, no black necrosis, dry bases  lt knee - pressure ulcer from contractions of legs - stg 4, not infected, minor necrotic tissue  rt heel - DTI  lt heel - fairly nl    LABS:    Culture - Urine (collected 01-22-19 @ 22:25)  Source: .Urine Clean Catch (Midstream)  Final Report (01-23-19 @ 23:19):    No growth    Culture - Blood (collected 01-22-19 @ 22:13)  Source: .Blood Blood-Peripheral  Final Report (01-28-19 @ 02:05):    No growth at 5 days.    Culture - Blood (collected 01-22-19 @ 22:13)  Source: .Blood Blood-Peripheral  Final Report (01-28-19 @ 02:05):    No growth at 5 days.    RADIOLOGY & ADDITIONAL TESTS:      MEDICATIONS:    amLODIPine   Tablet 10 milliGRAM(s) Oral daily  chlorhexidine 4% Liquid 1 Application(s) Topical <User Schedule>  docusate sodium Liquid 100 milliGRAM(s) Enteral Tube two times a day  pantoprazole   Suspension 40 milliGRAM(s) Oral <User Schedule>  potassium phosphate / sodium phosphate powder 1 Packet(s) Oral three times a day  silver sulfADIAZINE 1% Cream 1 Application(s) Topical daily

## 2019-01-29 VITALS — RESPIRATION RATE: 16 BRPM | TEMPERATURE: 99 F

## 2019-01-29 RX ORDER — ASPIRIN/CALCIUM CARB/MAGNESIUM 324 MG
1 TABLET ORAL
Qty: 0 | Refills: 0 | COMMUNITY

## 2019-01-29 RX ORDER — RIVASTIGMINE 4.6 MG/24H
1 PATCH, EXTENDED RELEASE TRANSDERMAL
Qty: 0 | Refills: 0 | COMMUNITY

## 2019-01-29 RX ORDER — MEMANTINE HYDROCHLORIDE 10 MG/1
1 TABLET ORAL
Qty: 0 | Refills: 0 | COMMUNITY

## 2019-01-29 RX ORDER — ROSUVASTATIN CALCIUM 5 MG/1
1 TABLET ORAL
Qty: 0 | Refills: 0 | COMMUNITY

## 2019-01-29 RX ADMIN — Medication 1 PACKET(S): at 15:52

## 2019-01-29 RX ADMIN — Medication 100 MILLIGRAM(S): at 06:07

## 2019-01-29 RX ADMIN — CHLORHEXIDINE GLUCONATE 1 APPLICATION(S): 213 SOLUTION TOPICAL at 06:08

## 2019-01-29 RX ADMIN — PANTOPRAZOLE SODIUM 40 MILLIGRAM(S): 20 TABLET, DELAYED RELEASE ORAL at 06:07

## 2019-01-29 RX ADMIN — AMLODIPINE BESYLATE 10 MILLIGRAM(S): 2.5 TABLET ORAL at 06:07

## 2019-01-29 RX ADMIN — Medication 1 PACKET(S): at 06:07

## 2019-01-29 RX ADMIN — Medication 1 APPLICATION(S): at 12:32

## 2019-01-29 NOTE — PROGRESS NOTE ADULT - SUBJECTIVE AND OBJECTIVE BOX
JUAQUIN TEIXEIRA  84y      INTERVAL EVENTS: Here for f/u of dementia. Pt stable. Non-verbal. Does moan if try to move legs, which are severely rigid and contracted. Tolerates feeds. No further bleeding reported.        neuro	non-verbal; eyes open today; PERRL	  · Constitutional Details	cachectic	  · Respiratory	CTA b/luse	  · Cardiovascular	Regular rate & rhythm, normal S1, S2; no murmurs, 	  · GI Normal	normal; soft; PEG tube present	  · Extremities	No cyanosis, clubbing or edema	  Neck:	No JVD	  		  b/l buttocks w/ st 4 ulcers, not infected, no black necrosis, dry bases  lt knee - pressure ulcer from contractions of legs - stg 4, not infected, minor necrotic tissue  rt heel - DTI  lt heel - fairly nl    LABS:    Culture - Urine (collected 01-22-19 @ 22:25)  Source: .Urine Clean Catch (Midstream)  Final Report (01-23-19 @ 23:19):    No growth    Culture - Blood (collected 01-22-19 @ 22:13)  Source: .Blood Blood-Peripheral  Final Report (01-28-19 @ 02:05):    No growth at 5 days.    Culture - Blood (collected 01-22-19 @ 22:13)  Source: .Blood Blood-Peripheral  Final Report (01-28-19 @ 02:05):    No growth at 5 days.    RADIOLOGY & ADDITIONAL TESTS:      MEDICATIONS:    amLODIPine   Tablet 10 milliGRAM(s) Oral daily  chlorhexidine 4% Liquid 1 Application(s) Topical <User Schedule>  docusate sodium Liquid 100 milliGRAM(s) Enteral Tube two times a day  pantoprazole   Suspension 40 milliGRAM(s) Oral <User Schedule>  potassium phosphate / sodium phosphate powder 1 Packet(s) Oral three times a day  silver sulfADIAZINE 1% Cream 1 Application(s) Topical daily

## 2019-01-29 NOTE — PROGRESS NOTE ADULT - REASON FOR ADMISSION
Fever, coffee ground substance from peg

## 2019-01-29 NOTE — PROGRESS NOTE ADULT - ASSESSMENT
83 y/o Female with PMH severe dementia, bedbound, pressure ulcers, nonverbal, HTN, HLD, DNR/DNI, recent admission for failure to thrive/debridement of ulcers/PEG tube placement 1 week ago presents with coffee ground debris in PEG tube.    # Coffee ground material from PEG tube and per mouth, likely from upper GI - now stopped  GI noted - Rx PPI BID, c/w PEG feeds  s/p EGD on 1/24 - ulcer seen, clipped + epi'd    # Failure to thrive 2/2 very advanced dementia - non-verbal  feeds adjusted by nutrition   family agreeable for hospice - f/u  would avoid further labs and studies    # decubitus pressure ulcers stage 4 on sacrum and knee, DTI rt heel - these were all present on admission  d/c abx  SSD Cr and W->D dressing and ABD pads w/ tap q24 for buttocks and knee wounds  Aluvyn and heel booties for both heels  no further intervention surgically - hospice    # HTN - c/w amlodipine    # DLD - on lipitor (can d/c this)    # Dementia - on memantine (can d/c this)    # Malnutrition - Hypernatremia  nutrition noted - follow Dr Hughes's rec  free water to  cc q8, in addition to feeds and flushes of PEG  no need to monitor    # DVT ppx: Heparin sc for now    #Code status: DNR/DNI    DISPO: D/C to Hospice today

## 2019-01-30 ENCOUNTER — OUTPATIENT (OUTPATIENT)
Dept: OUTPATIENT SERVICES | Facility: HOSPITAL | Age: 84
LOS: 1 days | End: 2019-01-30

## 2019-01-30 DIAGNOSIS — Z90.49 ACQUIRED ABSENCE OF OTHER SPECIFIED PARTS OF DIGESTIVE TRACT: Chronic | ICD-10-CM

## 2019-01-30 DIAGNOSIS — Z98.890 OTHER SPECIFIED POSTPROCEDURAL STATES: Chronic | ICD-10-CM

## 2019-01-31 DIAGNOSIS — G31.1 SENILE DEGENERATION OF BRAIN, NOT ELSEWHERE CLASSIFIED: ICD-10-CM

## 2019-01-31 DIAGNOSIS — E78.00 PURE HYPERCHOLESTEROLEMIA, UNSPECIFIED: ICD-10-CM

## 2019-01-31 DIAGNOSIS — I10 ESSENTIAL (PRIMARY) HYPERTENSION: ICD-10-CM

## 2019-01-31 DIAGNOSIS — K92.2 GASTROINTESTINAL HEMORRHAGE, UNSPECIFIED: ICD-10-CM

## 2019-02-01 DIAGNOSIS — E51.9 THIAMINE DEFICIENCY, UNSPECIFIED: ICD-10-CM

## 2019-02-01 DIAGNOSIS — M19.90 UNSPECIFIED OSTEOARTHRITIS, UNSPECIFIED SITE: ICD-10-CM

## 2019-02-01 DIAGNOSIS — R62.7 ADULT FAILURE TO THRIVE: ICD-10-CM

## 2019-02-01 DIAGNOSIS — Z51.5 ENCOUNTER FOR PALLIATIVE CARE: ICD-10-CM

## 2019-02-01 DIAGNOSIS — L89.214 PRESSURE ULCER OF RIGHT HIP, STAGE 4: ICD-10-CM

## 2019-02-01 DIAGNOSIS — Z88.0 ALLERGY STATUS TO PENICILLIN: ICD-10-CM

## 2019-02-01 DIAGNOSIS — R11.10 VOMITING, UNSPECIFIED: ICD-10-CM

## 2019-02-01 DIAGNOSIS — Z79.82 LONG TERM (CURRENT) USE OF ASPIRIN: ICD-10-CM

## 2019-02-01 DIAGNOSIS — I10 ESSENTIAL (PRIMARY) HYPERTENSION: ICD-10-CM

## 2019-02-01 DIAGNOSIS — E87.6 HYPOKALEMIA: ICD-10-CM

## 2019-02-01 DIAGNOSIS — E86.0 DEHYDRATION: ICD-10-CM

## 2019-02-01 DIAGNOSIS — F03.90 UNSPECIFIED DEMENTIA WITHOUT BEHAVIORAL DISTURBANCE: ICD-10-CM

## 2019-02-01 DIAGNOSIS — R53.2 FUNCTIONAL QUADRIPLEGIA: ICD-10-CM

## 2019-02-01 DIAGNOSIS — E43 UNSPECIFIED SEVERE PROTEIN-CALORIE MALNUTRITION: ICD-10-CM

## 2019-02-01 DIAGNOSIS — Z74.01 BED CONFINEMENT STATUS: ICD-10-CM

## 2019-02-01 DIAGNOSIS — E78.5 HYPERLIPIDEMIA, UNSPECIFIED: ICD-10-CM

## 2019-02-01 DIAGNOSIS — L89.154 PRESSURE ULCER OF SACRAL REGION, STAGE 4: ICD-10-CM

## 2019-02-01 DIAGNOSIS — E87.0 HYPEROSMOLALITY AND HYPERNATREMIA: ICD-10-CM

## 2019-02-01 DIAGNOSIS — R13.10 DYSPHAGIA, UNSPECIFIED: ICD-10-CM

## 2019-02-01 DIAGNOSIS — K22.11 ULCER OF ESOPHAGUS WITH BLEEDING: ICD-10-CM

## 2019-02-01 DIAGNOSIS — Z79.899 OTHER LONG TERM (CURRENT) DRUG THERAPY: ICD-10-CM

## 2019-02-01 DIAGNOSIS — L89.894 PRESSURE ULCER OF OTHER SITE, STAGE 4: ICD-10-CM

## 2019-02-01 DIAGNOSIS — L89.224 PRESSURE ULCER OF LEFT HIP, STAGE 4: ICD-10-CM

## 2019-02-01 DIAGNOSIS — K44.9 DIAPHRAGMATIC HERNIA WITHOUT OBSTRUCTION OR GANGRENE: ICD-10-CM

## 2019-02-01 DIAGNOSIS — L89.610 PRESSURE ULCER OF RIGHT HEEL, UNSTAGEABLE: ICD-10-CM

## 2019-02-01 DIAGNOSIS — L89.314 PRESSURE ULCER OF RIGHT BUTTOCK, STAGE 4: ICD-10-CM

## 2019-02-01 DIAGNOSIS — L08.9 LOCAL INFECTION OF THE SKIN AND SUBCUTANEOUS TISSUE, UNSPECIFIED: ICD-10-CM

## 2019-02-01 DIAGNOSIS — E46 UNSPECIFIED PROTEIN-CALORIE MALNUTRITION: ICD-10-CM

## 2019-02-01 DIAGNOSIS — I96 GANGRENE, NOT ELSEWHERE CLASSIFIED: ICD-10-CM

## 2019-02-01 DIAGNOSIS — E78.00 PURE HYPERCHOLESTEROLEMIA, UNSPECIFIED: ICD-10-CM

## 2019-02-01 DIAGNOSIS — K29.01 ACUTE GASTRITIS WITH BLEEDING: ICD-10-CM

## 2019-02-01 DIAGNOSIS — Z93.1 GASTROSTOMY STATUS: ICD-10-CM

## 2019-02-01 DIAGNOSIS — L89.324 PRESSURE ULCER OF LEFT BUTTOCK, STAGE 4: ICD-10-CM

## 2019-03-17 NOTE — ED ADULT NURSE NOTE - FAMILY HISTORY
No pertinent family history in first degree relatives Patient/Caregiver provided printed discharge information.

## 2019-11-02 NOTE — PATIENT PROFILE ADULT - BRADEN FRICTION AND SHEAR
PICC Team Note    Procedure: Insertion of Double Lumen Bard PICC Line    Indications: Antibiotics    Procedure Details:  Order for PICC line received and acknowledged, labs and diagnostics were also reviewed.  Risks and benefits were discussed with patient/family including use of local anesthetic, risks of thrombosis, bleeding, infection, tissue damage, and possible arterial puncture.  Informed consent was obtained for placement of PICC line.  Stop sign was placed on patient door prior to procedure.  There was/were one additional person(antonio) present during procedure who also donned gloves, hat, and mask after performing hand hygiene.      #4 FR Double Lumen Bard Power PICC Line inserted in the right basilic vein with 1 poke(s) using maximum sterile technique including chloraprep, cap, gown, gloves, hand hygiene, mask and drape per hospital protocol. Lidocaine 1% was used to anesthetize the site and a 21 gauge micropuncture needle was used to gain access into the vessel. A guidewire was used to secure vascular access. An introducer was then placed over the guidewire. The guidewire was removed and was noted to be intact.  The PICC line was cut and inserted at 40 cm with 0 cm exposed, Per [facility] measurement PICC line occupies 26 % of vessel.  PICC line secured using a Stat-lock device, biopatch placed over insertion site followed by a sterile dressing.  Mid upper arm circumference is 34.5 cm.  Brisk blood return noted to all port(s) and catheter was flushed with 20 cc NS. Dual caps were applied to each port. Bed was returned to low position prior to leaving patient room. Patient tolerated procedure well.    LOT #:  [id number]    Recommendations:  PICC line placement confirmed with ECG technology and maximum P waves noted.  Rufina [facility] notified and line is ready to use, and to change IV tubing prior to using PICC line.  Bard PICC Brochure, infection FACT sheet along with other education information regarding care and  maintenance was given to patient.    Laura Marks RN  [facility] PICC Team  [phone]   (1) problem

## 2023-10-10 NOTE — DIETITIAN INITIAL EVALUATION ADULT. - PHYSICAL APPEARANCE
Ordered fasting labs: HbA1c, comprehensive metabolic panel, lipid panel with reflex, and microalbumin urine random, to be done a week prior to the next visit.  Continue Ozempic 1 mg, once weekly.  Decrease Insulin Novolog 70/30 mixed from 20 units to 10 units in the morning and evening. Increase 2 units if the blood glucose is more than 140 mg/dL. Explained rationale.  Continue Metformin 500 mg, one tablet once a day with meals.  Recommended checking blood glucose twice daily before alternate meals.  Follow-up in three months with labs done one week prior to the next visit.  
pt asleep, nonverbal at baseline as per nursing. BMI-19.3, IBW- 50kg. Skin - pressure ulcers to:  rt hip (stage III), sacrum (stage II), lt knee (stage II), lt hip ( unstagebale). Last documented BM 12/19 (vs. fecal incontinence 12/23). Suspected poor dentition (based on facial shape)./other (specify)

## 2024-11-26 NOTE — PRE-ANESTHESIA EVALUATION ADULT - MALLAMPATI CLASS
Spoke to pt ensured they knew and confirmed EGD was scheduled for next week (12/4).  
Class II - visualization of the soft palate, fauces, and uvula

## 2025-04-23 NOTE — DIETITIAN INITIAL EVALUATION ADULT. - NUTRITIONGOAL OUTCOME1
Universal Safety Interventions In 3 days: 1.PO intake >75% meals, snacks and PO supplements 2. Glycemic control/labs trending WDL